# Patient Record
Sex: FEMALE | Race: BLACK OR AFRICAN AMERICAN | NOT HISPANIC OR LATINO | Employment: FULL TIME | URBAN - METROPOLITAN AREA
[De-identification: names, ages, dates, MRNs, and addresses within clinical notes are randomized per-mention and may not be internally consistent; named-entity substitution may affect disease eponyms.]

---

## 2018-04-26 ENCOUNTER — OFFICE VISIT (OUTPATIENT)
Dept: INTERNAL MEDICINE CLINIC | Facility: CLINIC | Age: 53
End: 2018-04-26
Payer: COMMERCIAL

## 2018-04-26 VITALS
DIASTOLIC BLOOD PRESSURE: 90 MMHG | SYSTOLIC BLOOD PRESSURE: 122 MMHG | HEART RATE: 80 BPM | HEIGHT: 63 IN | RESPIRATION RATE: 14 BRPM | WEIGHT: 175 LBS | BODY MASS INDEX: 31.01 KG/M2

## 2018-04-26 DIAGNOSIS — I10 ESSENTIAL HYPERTENSION: Primary | ICD-10-CM

## 2018-04-26 DIAGNOSIS — N18.2 CHRONIC KIDNEY DISEASE, STAGE 2 (MILD): ICD-10-CM

## 2018-04-26 DIAGNOSIS — I51.7 LEFT ATRIAL ENLARGEMENT: ICD-10-CM

## 2018-04-26 PROCEDURE — 3725F SCREEN DEPRESSION PERFORMED: CPT | Performed by: INTERNAL MEDICINE

## 2018-04-26 PROCEDURE — 93000 ELECTROCARDIOGRAM COMPLETE: CPT | Performed by: INTERNAL MEDICINE

## 2018-04-26 PROCEDURE — 99204 OFFICE O/P NEW MOD 45 MIN: CPT | Performed by: INTERNAL MEDICINE

## 2018-04-26 RX ORDER — AMLODIPINE BESYLATE 10 MG/1
TABLET ORAL
Refills: 1 | COMMUNITY
Start: 2018-04-14 | End: 2018-06-05 | Stop reason: ALTCHOICE

## 2018-04-26 RX ORDER — ASPIRIN 81 MG/1
81 TABLET ORAL DAILY
COMMUNITY
End: 2019-10-08 | Stop reason: ALTCHOICE

## 2018-04-26 RX ORDER — CLONIDINE HYDROCHLORIDE 0.1 MG/1
0.1 TABLET ORAL EVERY 8 HOURS SCHEDULED
Qty: 90 TABLET | Refills: 3 | Status: SHIPPED | OUTPATIENT
Start: 2018-04-26 | End: 2018-05-31 | Stop reason: SDUPTHER

## 2018-04-26 NOTE — PATIENT INSTRUCTIONS
Blood pressure control needs a bit of improvement  Left atrial enlargement on EKG needs assessment with echocardiogram   Recommendation is the echocardiogram plus ultrasound of the kidney and bladder, Nephrology consultation, more comprehensive laboratory testing include 24 hr urine, follow-up in a month    Trial of clonidine 0 1 mg  Take it once a day for 3 days, then twice a day for 3 days, then advance to the full 3 times a day at intervals  Continue the amlodipine

## 2018-04-26 NOTE — PROGRESS NOTES
Assessment/Plan:       Diagnoses and all orders for this visit:    Essential hypertension  -     CBC and differential; Future  -     Lipid Panel with Direct LDL reflex; Future  -     Comprehensive metabolic panel; Future  -     TSH, 3rd generation with T4 reflex; Future  -     Urinalysis with reflex to microscopic  -     Echo complete with contrast if indicated; Future  -     POCT ECG    Chronic kidney disease, stage 2 (mild)  -     Creatinine Clearance 24 Hr; Future  -     Ambulatory referral to Nephrology; Future  -     US kidney and bladder; Future    Left atrial enlargement  -     Echo complete with contrast if indicated; Future  -     POCT ECG    Other orders  -     amLODIPine (NORVASC) 10 mg tablet;   -     aspirin (ECOTRIN LOW STRENGTH) 81 mg EC tablet; Take 81 mg by mouth daily              Subjective:      Patient ID: Hernan Vu is a 45 y o  female  A 49-year-old [facility] American female feels well but is hypertensive and has noted increasing BUN and creatinine on laboratory testing  No other diagnoses referable to possible CKD  The following portions of the patient's history were reviewed and updated as appropriate:   She has a past medical history of Hypertension  ,   does not have any pertinent problems on file  ,   has no past surgical history on file  ,  family history includes Alzheimer's disease in her father; Diabetes in her mother; Heart disease in her mother; Hypertension in her father  ,   reports that she has never smoked  She has never used smokeless tobacco  She reports that she drinks alcohol  She reports that she does not use drugs  ,  has No Known Allergies     Current Outpatient Prescriptions   Medication Sig Dispense Refill    amLODIPine (NORVASC) 10 mg tablet   1    aspirin (ECOTRIN LOW STRENGTH) 81 mg EC tablet Take 81 mg by mouth daily       No current facility-administered medications for this visit          Review of Systems   Constitutional: Negative for chills and fever  HENT: Negative for sore throat and trouble swallowing  Eyes: Negative for pain  Respiratory: Negative for cough, shortness of breath and wheezing  Gastrointestinal: Negative for abdominal pain, diarrhea, nausea and vomiting  Endocrine: Negative for cold intolerance and heat intolerance  Genitourinary: Negative for dysuria, frequency and pelvic pain  Musculoskeletal: Negative for arthralgias and joint swelling  Skin: Negative for rash and wound  Allergic/Immunologic: Negative for immunocompromised state  Neurological: Negative for dizziness, seizures, syncope and headaches  Psychiatric/Behavioral: Negative for dysphoric mood  The patient is not nervous/anxious  Objective:  Vitals:    04/26/18 0816   BP: 122/90   Pulse: 80   Resp: 14      Physical Exam   Constitutional: She is oriented to person, place, and time  She appears well-developed and well-nourished  Significantly overweight but in no distress   HENT:   Head: Normocephalic and atraumatic  Eyes: EOM are normal  Pupils are equal, round, and reactive to light  Neck: Normal range of motion  Neck supple  No tracheal deviation present  No thyromegaly present  Cardiovascular: Normal rate, regular rhythm and normal heart sounds  Exam reveals no gallop  No murmur heard  Pulmonary/Chest: No respiratory distress  She has no wheezes  She has no rales  Abdominal: Soft  Bowel sounds are normal  There is no tenderness  Musculoskeletal: Normal range of motion  She exhibits no tenderness or deformity  Neurological: She is alert and oriented to person, place, and time  Coordination normal    Skin: Skin is warm  Psychiatric: She has a normal mood and affect   Judgment normal

## 2018-05-07 ENCOUNTER — HOSPITAL ENCOUNTER (OUTPATIENT)
Dept: ULTRASOUND IMAGING | Facility: HOSPITAL | Age: 53
Discharge: HOME | End: 2018-05-07
Payer: COMMERCIAL

## 2018-05-07 DIAGNOSIS — N18.2 CHRONIC KIDNEY DISEASE, STAGE 2 (MILD): ICD-10-CM

## 2018-05-07 PROCEDURE — 76770 US EXAM ABDO BACK WALL COMP: CPT

## 2018-05-16 LAB
CHOLEST SERPL-MCNC: 198 MG/DL (ref 50–200)
EXT GLUCOSE BLD: 116
EXTERNAL ALBUMIN: 3.9
EXTERNAL ALK PHOS: 100
EXTERNAL ALT: 20
EXTERNAL ANION GAP: 3
EXTERNAL AST: 12
EXTERNAL BICARBONATE: 30
EXTERNAL BUN: 12
EXTERNAL CALCIUM: 9.2
EXTERNAL CHLORIDE: 109
EXTERNAL CREATININE: 1.1
EXTERNAL EGFR: 57
EXTERNAL POTASSIUM: 4.1
EXTERNAL SODIUM: 142
EXTERNAL T.BILIRUBIN: 0.4
EXTERNAL TOTAL PROTEIN: 7.5
HCT VFR BLD AUTO: 40.7 % (ref 34.8–46.1)
HDLC SERPL-MCNC: 54 MG/DL (ref 40–60)
HGB BLD-MCNC: 13.6 G/DL (ref 11.5–15.4)
LDLC SERPL CALC-MCNC: 128 MG/DL (ref 0–100)
PLATELET # BLD AUTO: 302 THOUSANDS/UL (ref 149–390)
TRIGL SERPL-MCNC: 79 MG/DL (ref ?–150)
TSH SERPL DL<=0.005 MIU/L-ACNC: 1.15 M[IU]/L
WBC # BLD AUTO: 4.4 THOUSAND/UL

## 2018-05-17 ENCOUNTER — HOSPITAL ENCOUNTER (OUTPATIENT)
Dept: NON INVASIVE DIAGNOSTICS | Facility: HOSPITAL | Age: 53
Discharge: HOME | End: 2018-05-17
Payer: COMMERCIAL

## 2018-05-17 DIAGNOSIS — I51.7 LEFT ATRIAL ENLARGEMENT: ICD-10-CM

## 2018-05-17 DIAGNOSIS — I10 ESSENTIAL HYPERTENSION: ICD-10-CM

## 2018-05-17 PROCEDURE — 93306 TTE W/DOPPLER COMPLETE: CPT | Performed by: INTERNAL MEDICINE

## 2018-05-17 PROCEDURE — 93306 TTE W/DOPPLER COMPLETE: CPT

## 2018-05-25 ENCOUNTER — APPOINTMENT (OUTPATIENT)
Dept: LAB | Facility: HOSPITAL | Age: 53
End: 2018-05-25
Attending: INTERNAL MEDICINE
Payer: COMMERCIAL

## 2018-05-25 ENCOUNTER — OFFICE VISIT (OUTPATIENT)
Dept: NEPHROLOGY | Facility: CLINIC | Age: 53
End: 2018-05-25
Payer: COMMERCIAL

## 2018-05-25 VITALS
HEART RATE: 81 BPM | BODY MASS INDEX: 30.96 KG/M2 | DIASTOLIC BLOOD PRESSURE: 80 MMHG | TEMPERATURE: 96.9 F | WEIGHT: 172 LBS | SYSTOLIC BLOOD PRESSURE: 136 MMHG

## 2018-05-25 DIAGNOSIS — I12.9 HYPERTENSIVE KIDNEY DISEASE WITH STAGE 3 CHRONIC KIDNEY DISEASE (HCC): ICD-10-CM

## 2018-05-25 DIAGNOSIS — E55.9 VITAMIN D DEFICIENCY: ICD-10-CM

## 2018-05-25 DIAGNOSIS — N18.2 CHRONIC KIDNEY DISEASE, STAGE 2 (MILD): ICD-10-CM

## 2018-05-25 DIAGNOSIS — N18.30 HYPERTENSIVE KIDNEY DISEASE WITH STAGE 3 CHRONIC KIDNEY DISEASE (HCC): ICD-10-CM

## 2018-05-25 DIAGNOSIS — N18.30 STAGE 3 CHRONIC KIDNEY DISEASE (HCC): ICD-10-CM

## 2018-05-25 DIAGNOSIS — N18.30 STAGE 3 CHRONIC KIDNEY DISEASE (HCC): Primary | ICD-10-CM

## 2018-05-25 LAB
25(OH)D3 SERPL-MCNC: 25.2 NG/ML (ref 30–100)
ANION GAP SERPL CALCULATED.3IONS-SCNC: 9 MMOL/L (ref 4–13)
BASOPHILS # BLD AUTO: 0.02 THOUSANDS/ΜL (ref 0–0.1)
BASOPHILS NFR BLD AUTO: 0 % (ref 0–1)
BILIRUB UR QL STRIP: NEGATIVE
BUN SERPL-MCNC: 10 MG/DL (ref 5–25)
CALCIUM SERPL-MCNC: 9.4 MG/DL (ref 8.3–10.1)
CHLORIDE SERPL-SCNC: 104 MMOL/L (ref 100–108)
CLARITY UR: CLEAR
CO2 SERPL-SCNC: 29 MMOL/L (ref 21–32)
COLOR UR: YELLOW
CREAT SERPL-MCNC: 0.98 MG/DL (ref 0.6–1.3)
CREAT UR-MCNC: 187 MG/DL
EOSINOPHIL # BLD AUTO: 0.06 THOUSAND/ΜL (ref 0–0.61)
EOSINOPHIL NFR BLD AUTO: 1 % (ref 0–6)
ERYTHROCYTE [DISTWIDTH] IN BLOOD BY AUTOMATED COUNT: 13.2 % (ref 11.6–15.1)
GFR SERPL CREATININE-BSD FRML MDRD: 76 ML/MIN/1.73SQ M
GLUCOSE SERPL-MCNC: 137 MG/DL (ref 65–140)
GLUCOSE UR STRIP-MCNC: NEGATIVE MG/DL
HCT VFR BLD AUTO: 42.3 % (ref 34.8–46.1)
HGB BLD-MCNC: 13.7 G/DL (ref 11.5–15.4)
HGB UR QL STRIP.AUTO: NEGATIVE
IMM GRANULOCYTES # BLD AUTO: 0.01 THOUSAND/UL (ref 0–0.2)
IMM GRANULOCYTES NFR BLD AUTO: 0 % (ref 0–2)
KETONES UR STRIP-MCNC: NEGATIVE MG/DL
LEUKOCYTE ESTERASE UR QL STRIP: NEGATIVE
LYMPHOCYTES # BLD AUTO: 2.81 THOUSANDS/ΜL (ref 0.6–4.47)
LYMPHOCYTES NFR BLD AUTO: 59 % (ref 14–44)
MCH RBC QN AUTO: 26.4 PG (ref 26.8–34.3)
MCHC RBC AUTO-ENTMCNC: 32.4 G/DL (ref 31.4–37.4)
MCV RBC AUTO: 82 FL (ref 82–98)
MICROALBUMIN UR-MCNC: 26.7 MG/L (ref 0–20)
MICROALBUMIN/CREAT 24H UR: 14 MG/G CREATININE (ref 0–30)
MONOCYTES # BLD AUTO: 0.38 THOUSAND/ΜL (ref 0.17–1.22)
MONOCYTES NFR BLD AUTO: 8 % (ref 4–12)
NEUTROPHILS # BLD AUTO: 1.51 THOUSANDS/ΜL (ref 1.85–7.62)
NEUTS SEG NFR BLD AUTO: 32 % (ref 43–75)
NITRITE UR QL STRIP: NEGATIVE
NRBC BLD AUTO-RTO: 0 /100 WBCS
PH UR STRIP.AUTO: 7 [PH] (ref 4.5–8)
PHOSPHATE SERPL-MCNC: 3.9 MG/DL (ref 2.7–4.5)
PLATELET # BLD AUTO: 347 THOUSANDS/UL (ref 149–390)
PMV BLD AUTO: 10.5 FL (ref 8.9–12.7)
POTASSIUM SERPL-SCNC: 3 MMOL/L (ref 3.5–5.3)
PROT UR STRIP-MCNC: NEGATIVE MG/DL
PTH-INTACT SERPL-MCNC: 47.4 PG/ML (ref 18.4–80.1)
RBC # BLD AUTO: 5.18 MILLION/UL (ref 3.81–5.12)
SODIUM SERPL-SCNC: 142 MMOL/L (ref 136–145)
SP GR UR STRIP.AUTO: 1.01 (ref 1–1.03)
URATE SERPL-MCNC: 5.3 MG/DL (ref 2–6.8)
UROBILINOGEN UR QL STRIP.AUTO: 0.2 E.U./DL
WBC # BLD AUTO: 4.79 THOUSAND/UL (ref 4.31–10.16)

## 2018-05-25 PROCEDURE — 81003 URINALYSIS AUTO W/O SCOPE: CPT

## 2018-05-25 PROCEDURE — 83970 ASSAY OF PARATHORMONE: CPT

## 2018-05-25 PROCEDURE — 85025 COMPLETE CBC W/AUTO DIFF WBC: CPT

## 2018-05-25 PROCEDURE — 80048 BASIC METABOLIC PNL TOTAL CA: CPT

## 2018-05-25 PROCEDURE — 84100 ASSAY OF PHOSPHORUS: CPT

## 2018-05-25 PROCEDURE — 82570 ASSAY OF URINE CREATININE: CPT

## 2018-05-25 PROCEDURE — 99244 OFF/OP CNSLTJ NEW/EST MOD 40: CPT | Performed by: INTERNAL MEDICINE

## 2018-05-25 PROCEDURE — 82306 VITAMIN D 25 HYDROXY: CPT

## 2018-05-25 PROCEDURE — 84550 ASSAY OF BLOOD/URIC ACID: CPT

## 2018-05-25 PROCEDURE — 36415 COLL VENOUS BLD VENIPUNCTURE: CPT

## 2018-05-25 PROCEDURE — 82043 UR ALBUMIN QUANTITATIVE: CPT

## 2018-05-25 NOTE — PROGRESS NOTES
NEPHROLOGY OFFICE CONSULT  Son Brown 45 y o  female MRN: [id number]    Encounter: [id number] DATE: 5/25/2018    REASON FOR VISIT: Son Brown is a 45 y  o female who was referred by Dr Ulrich for evaluation  Rubia Batista HPI:    This is a 49-year-old female with a past medical history of hypertension, referred to Nephrology for further management of CKD stage 3  Patient said that in February she was found to have GFR of 47 but last known creatinine is 1 1 with EGFR of 57 (5/7/2018)    Patient has a history of hypertension and has been taking antihypertensive medication for > 10 years  Patient said that her blood pressure was not under very well control and last month she was started on clonidine 0 1 mg p o  BID and since that time her blood pressure seems to be under well control  Patient also complains of on and off leg swelling and currently have very trace pedal edema  Patient said that her leg swelling get worsen as day progress  Patient is also complained about some right flank pain and was recently prescribed Mobic but before that seems that patient was prescribed ibuprofen  REVIEW OF SYSTEMS:    Review of Systems   Constitutional: Negative for chills and fever  HENT: Negative for nosebleeds and sore throat  Eyes: Negative for photophobia and pain  Respiratory: Negative for choking and wheezing  Cardiovascular: Negative for palpitations and leg swelling  Gastrointestinal: Negative for abdominal pain and blood in stool  Endocrine: Negative for cold intolerance and heat intolerance  Genitourinary: Negative for flank pain and hematuria  Musculoskeletal: Negative for joint swelling and neck pain  Skin: Negative for color change and pallor  Allergic/Immunologic: Negative for environmental allergies and immunocompromised state  Neurological: Negative for seizures and syncope  Hematological: Negative for adenopathy  Does not bruise/bleed easily  Psychiatric/Behavioral: Negative for confusion and suicidal ideas  PAST MEDICAL HISTORY:  Past Medical History:   Diagnosis Date    Hypertension        PAST SURGICAL HISTORY:  History reviewed  No pertinent surgical history  SOCIAL HISTORY:  History   Alcohol Use    Yes     Comment: sometimes      History   Drug Use No     History   Smoking Status    Never Smoker   Smokeless Tobacco    Never Used       FAMILY HISTORY:  Family History   Problem Relation Age of Onset    Diabetes Mother     Heart disease Mother     Alzheimer's disease Father     Hypertension Father        ALLERGY:  No Known Allergies    MEDICATIONS:    Current Outpatient Prescriptions:     amLODIPine (NORVASC) 10 mg tablet, , Disp: , Rfl: 1    aspirin (ECOTRIN LOW STRENGTH) 81 mg EC tablet, Take 81 mg by mouth daily, Disp: , Rfl:     cloNIDine (CATAPRES) 0 1 mg tablet, Take 1 tablet (0 1 mg total) by mouth every 8 (eight) hours, Disp: 90 tablet, Rfl: 3    PHYSICAL EXAM:  Vitals:    05/25/18 0933   BP: 136/80   BP Location: Left arm   Patient Position: Sitting   Cuff Size: Adult   Pulse: 81   Temp: (!) 96 9 °F (36 1 °C)   TempSrc: Tympanic   Weight: 78 kg (172 lb)     Body mass index is 30 96 kg/m²  Physical Exam   Constitutional: She appears well-nourished  No distress  HENT:   Head: Normocephalic and atraumatic  Eyes: Conjunctivae are normal  No scleral icterus  Right eye exhibits normal extraocular motion  Left eye exhibits normal extraocular motion  Neck: Neck supple  No JVD present  Cardiovascular: Normal rate, S1 normal and S2 normal     Pulmonary/Chest: No accessory muscle usage  No respiratory distress  She has no wheezes  Abdominal: Soft  She exhibits no distension  Musculoskeletal:        Right ankle: She exhibits swelling  Left ankle: She exhibits swelling  Right hand: She exhibits no laceration  Left hand: She exhibits no laceration     Lymphadenopathy:        Right cervical: No superficial cervical adenopathy present  Left cervical: No superficial cervical adenopathy present  Right: No supraclavicular adenopathy present  Left: No supraclavicular adenopathy present  Neurological: She is alert  She is not disoriented  Skin: Skin is warm  No laceration noted  No cyanosis  Psychiatric: She is not combative  She does not exhibit a depressed mood  She expresses no suicidal ideation  LAB RESULTS:  Results for orders placed or performed in visit on 05/16/18   Comprehensive metabolic panel   Result Value Ref Range    EXTERNAL SODIUM 142     EXTERNAL POTASSIUM 4 1     EXTERNAL CHLORIDE 109     EXTERNAL BICARB 30     EXTERNAL ANION GAP 3     EXTERNAL BUN 12     EXTERNAL CREATININE 1 10     EXT Glucose Bld 116     EXTERNAL CALCIUM 9 2     EXTERNAL TOTAL PROTEIN 7 5     EXTERNAL ALBUMIN 3 9     EXTERNAL AST 12     EXTERNAL ALT 20     EXTERNAL ALK PHOS 100     EXTERNAL TOT  BILIRUBIN 0 4     EXTERNAL EGFR 57    Lipid panel   Result Value Ref Range    Cholesterol 198 50 - 200 mg/dL    Triglycerides 79 150 mg/dL    Total HDL  Direct 54 40 - 60 mg/dL    LDL Calculated 128 (A) 0 - 100 mg/dL   CBC   Result Value Ref Range    WBC 4 40 Thousand/uL    Hemoglobin 13 6 11 5 - 15 4 g/dL    Hematocrit 40 7 34 8 - 46 1 %    Platelets 633 133 - 737 Thousands/uL   TSH Stimulation   Result Value Ref Range    TSH 1 15        ASSESSMENT and PLAN:  Roni Greenwood was seen today for consult  Diagnoses and all orders for this visit:    Stage 3 chronic kidney disease  -     Basic metabolic panel; Future  -     CBC and differential; Future  -     Microalbumin / creatinine urine ratio; Future  -     Phosphorus; Future  -     PTH, intact; Future  -     Uric acid; Future  -     Urinalysis with reflex to microscopic; Future  -     Vitamin D 25 hydroxy; Future  -     Basic metabolic panel; Future  -     CBC and differential; Future  -     Microalbumin / creatinine urine ratio;  Future  -     Urinalysis with reflex to microscopic; Future    Hypertensive kidney disease with stage 3 chronic kidney disease  -     Basic metabolic panel; Future    Chronic kidney disease, stage 2 (mild)  -     Ambulatory referral to Nephrology      1  CKD stage 3  Multifactorial and suspect secondary to underlying hypertensive nephrosclerosis  Patient's last known creatinine is 1 1 with EGFR of 57  Plan to check CBC, BMP, uric acid, PTH, vitamin-D, urine analysis along with urine microalbumin to creatinine ratio to further evaluate chronic kidney disease  The patient had underwent renal ultrasound on 5/7/2018 and found to have increased echogenicity but no stone or hydronephrosis  Currently hypertension is under well control  Patient was advised to avoid Mobic and other NSAID  Plan to repeat renal function before next visit  2   Hypertension in chronic kidney disease  Today's blood pressure was controlled and at goal   Plan to monitor hypertension today with clonidine 0 1 mg p o  TID and amlodipine 10 mg p o  daily  Advised to follow low-salt diet  Returns to Nephrology Clinic in 3 months  Plan to check CBC, BMP, urine analysis along with urine microalbumin to creatinine ratio before next visit  Patient can be reached at [phone]    Lab (reviewed on 6/5/2018)-called and discussed results with patient today  Improved creatinine at 0 98 with EGFR of 76  Hemoglobin 13 7  Vitamin-D 25-> start ergocalciferol 50,000 Units p o  weekly x8 weeks  Potassium 3 0-> advised patient to eat high potassium diet for next 3-4 days  Urine analysis showed no dysuria or hematuria  Urine microalbumin to creatinine ratio of 14 mg  Normal PTH (47), uric acid (5 3), sodium, bicarb, calcium and phosphorus  New prescription was sent to the pharmacy  Patient was complaining of bilateral leg swelling  I doubt that swelling is related to underlying chronic kidney disease in the light of current GFR    Advised patient to follow fluid restriction plus advised to wear compression stocking during the day time  Into bruit amlodipine can cause leg swelling and plan to stop amlodipine for time being  Patient is taking clonidine 0 1 mg p o  at night only (and not 3 times a day) and reluctant to go up on clonidine dose for time being  Advised patient to keep taking current dose of clonidine and contact me if systolic blood pressure noticed to be > 160 for > 3 days for further evaluation  All the questions were answered  Phone call (6/12/2018)  Patient does not like leg swelling or like to stop amlodipine  Discussed overall case with patient today  Plan to stop amlodipine  Patient cannot take higher doses of clonidine as it will make her sleepier  Plan to start patient on Coreg 12 5 mg PO BID  New prescription was sent to the pharmacy  Phone call (6/28/2018)  Patient called earlier and worried that her systolic blood pressure has still remained elevated  I have called and discussed overall case with patient and seems that patient's SBP is running > 140  Plan to increase Coreg to 25 mg p o  BID (patient is also taking clonidine 0 1 mg p o  at night time)     All the questions were answered  New prescription was sent to the pharmacy  Advised patient to make a log of blood pressure reading for my review with the next visit  Lab (reviewed on 8/22/2018)  Relatively stable creatinine at 1 13 with EGFR of 63  Hemoglobin 13 5  Urine analysis showed improper collection  Urine microalbumin to creatinine ratio of 15 mg  Potassium 3 4-> monitor  Normal sodium, bicarb and calcium    NO CHANGE    Lab (reviewed on 10/17/2018)  Stable creatinine at 0 96 with EGFR of 77  Hemoglobin 13 4  Urine analysis showed no dysuria  Urine microalbumin to creatinine ratio of 18 mg  Normal sodium, potassium, bicarb and calcium    NO CHANGE    Portions of the record may have been created with voice recognition software   Occasional wrong word or "sound a like" substitutions may have occurred due to the inherent limitations of voice recognition software  Read the chart carefully and recognize, using context, where substitutions have occurred  If you have any questions, please contact the dictating provider

## 2018-05-25 NOTE — LETTER
May 25, 2018     Rosanna Link MD  [address] [facility] [address]    Patient: Lorenza Bishop   YOB: 1965   Date of Visit: 5/25/2018       Dear Dr Randa Darling: Thank you for referring Lorenza Bishop to me for evaluation  Below are my notes for this consultation  If you have questions, please do not hesitate to call me  I look forward to following your patient along with you  Sincerely,        Sue Reed MD        CC: No Recipients  Sue Reed MD  5/25/2018 11:50 AM  Sign at close encounter  Naldo Machado 45 y o  female MRN: [id number]    Encounter: [id number] DATE: 5/25/2018    REASON FOR VISIT: Lorenza Bishop is a 45 y  o female who was referred by Dr Ulrich for evaluation  Tomás Rivera HPI:    This is a 49-year-old female with a past medical history of hypertension, referred to Nephrology for further management of CKD stage 3  Patient said that in February she was found to have GFR of 47 but last known creatinine is 1 1 with EGFR of 57 (5/7/2018)    Patient has a history of hypertension and has been taking antihypertensive medication for > 10 years  Patient said that her blood pressure was not under very well control and last month she was started on clonidine 0 1 mg p o  BID and since that time her blood pressure seems to be under well control  Patient also complains of on and off leg swelling and currently have very trace pedal edema  Patient said that her leg swelling get worsen as day progress  Patient is also complained about some right flank pain and was recently prescribed Mobic but before that seems that patient was prescribed ibuprofen  REVIEW OF SYSTEMS:    Review of Systems   Constitutional: Negative for chills and fever  HENT: Negative for nosebleeds and sore throat  Eyes: Negative for photophobia and pain  Respiratory: Negative for choking and wheezing  Cardiovascular: Negative for palpitations and leg swelling  Gastrointestinal: Negative for abdominal pain and blood in stool  Endocrine: Negative for cold intolerance and heat intolerance  Genitourinary: Negative for flank pain and hematuria  Musculoskeletal: Negative for joint swelling and neck pain  Skin: Negative for color change and pallor  Allergic/Immunologic: Negative for environmental allergies and immunocompromised state  Neurological: Negative for seizures and syncope  Hematological: Negative for adenopathy  Does not bruise/bleed easily  Psychiatric/Behavioral: Negative for confusion and suicidal ideas  PAST MEDICAL HISTORY:  Past Medical History:   Diagnosis Date    Hypertension        PAST SURGICAL HISTORY:  History reviewed  No pertinent surgical history  SOCIAL HISTORY:  History   Alcohol Use    Yes     Comment: sometimes      History   Drug Use No     History   Smoking Status    Never Smoker   Smokeless Tobacco    Never Used       FAMILY HISTORY:  Family History   Problem Relation Age of Onset    Diabetes Mother     Heart disease Mother     Alzheimer's disease Father     Hypertension Father        ALLERGY:  No Known Allergies    MEDICATIONS:    Current Outpatient Prescriptions:     amLODIPine (NORVASC) 10 mg tablet, , Disp: , Rfl: 1    aspirin (ECOTRIN LOW STRENGTH) 81 mg EC tablet, Take 81 mg by mouth daily, Disp: , Rfl:     cloNIDine (CATAPRES) 0 1 mg tablet, Take 1 tablet (0 1 mg total) by mouth every 8 (eight) hours, Disp: 90 tablet, Rfl: 3    PHYSICAL EXAM:  Vitals:    05/25/18 0933   BP: 136/80   BP Location: Left arm   Patient Position: Sitting   Cuff Size: Adult   Pulse: 81   Temp: (!) 96 9 °F (36 1 °C)   TempSrc: Tympanic   Weight: 78 kg (172 lb)     Body mass index is 30 96 kg/m²  Physical Exam   Constitutional: She appears well-nourished  No distress  HENT:   Head: Normocephalic and atraumatic     Eyes: Conjunctivae are normal  No scleral icterus  Right eye exhibits normal extraocular motion  Left eye exhibits normal extraocular motion  Neck: Neck supple  No JVD present  Cardiovascular: Normal rate, S1 normal and S2 normal     Pulmonary/Chest: No accessory muscle usage  No respiratory distress  She has no wheezes  Abdominal: Soft  She exhibits no distension  Musculoskeletal:        Right ankle: She exhibits swelling  Left ankle: She exhibits swelling  Right hand: She exhibits no laceration  Left hand: She exhibits no laceration  Lymphadenopathy:        Right cervical: No superficial cervical adenopathy present  Left cervical: No superficial cervical adenopathy present  Right: No supraclavicular adenopathy present  Left: No supraclavicular adenopathy present  Neurological: She is alert  She is not disoriented  Skin: Skin is warm  No laceration noted  No cyanosis  Psychiatric: She is not combative  She does not exhibit a depressed mood  She expresses no suicidal ideation  LAB RESULTS:  Results for orders placed or performed in visit on 05/16/18   Comprehensive metabolic panel   Result Value Ref Range    EXTERNAL SODIUM 142     EXTERNAL POTASSIUM 4 1     EXTERNAL CHLORIDE 109     EXTERNAL BICARB 30     EXTERNAL ANION GAP 3     EXTERNAL BUN 12     EXTERNAL CREATININE 1 10     EXT Glucose Bld 116     EXTERNAL CALCIUM 9 2     EXTERNAL TOTAL PROTEIN 7 5     EXTERNAL ALBUMIN 3 9     EXTERNAL AST 12     EXTERNAL ALT 20     EXTERNAL ALK PHOS 100     EXTERNAL TOT   BILIRUBIN 0 4     EXTERNAL EGFR 57    Lipid panel   Result Value Ref Range    Cholesterol 198 50 - 200 mg/dL    Triglycerides 79 150 mg/dL    Total HDL  Direct 54 40 - 60 mg/dL    LDL Calculated 128 (A) 0 - 100 mg/dL   CBC   Result Value Ref Range    WBC 4 40 Thousand/uL    Hemoglobin 13 6 11 5 - 15 4 g/dL    Hematocrit 40 7 34 8 - 46 1 %    Platelets 965 906 - 436 Thousands/uL   TSH Stimulation   Result Value Ref Range    TSH 1 15 ASSESSMENT and PLAN:  Beau Walker was seen today for consult  Diagnoses and all orders for this visit:    Stage 3 chronic kidney disease  -     Basic metabolic panel; Future  -     CBC and differential; Future  -     Microalbumin / creatinine urine ratio; Future  -     Phosphorus; Future  -     PTH, intact; Future  -     Uric acid; Future  -     Urinalysis with reflex to microscopic; Future  -     Vitamin D 25 hydroxy; Future  -     Basic metabolic panel; Future  -     CBC and differential; Future  -     Microalbumin / creatinine urine ratio; Future  -     Urinalysis with reflex to microscopic; Future    Hypertensive kidney disease with stage 3 chronic kidney disease  -     Basic metabolic panel; Future    Chronic kidney disease, stage 2 (mild)  -     Ambulatory referral to Nephrology      1  CKD stage 3  Multifactorial and suspect secondary to underlying hypertensive nephrosclerosis  Patient's last known creatinine is 1 1 with EGFR of 57  Plan to check CBC, BMP, uric acid, PTH, vitamin-D, urine analysis along with urine microalbumin to creatinine ratio to further evaluate chronic kidney disease  The patient had underwent renal ultrasound on 5/7/2018 and found to have increased echogenicity but no stone or hydronephrosis  Currently hypertension is under well control  Patient was advised to avoid Mobic and other NSAID  Plan to repeat renal function before next visit  2   Hypertension in chronic kidney disease  Today's blood pressure was controlled and at goal   Plan to monitor hypertension today with clonidine 0 1 mg p o  TID and amlodipine 10 mg p o  daily  Advised to follow low-salt diet  Returns to Nephrology Clinic in 3 months  Plan to check CBC, BMP, urine analysis along with urine microalbumin to creatinine ratio before next visit  Patient can be reached at [phone]    Portions of the record may have been created with voice recognition software   Occasional wrong word or "sound a like" substitutions may have occurred due to the inherent limitations of voice recognition software  Read the chart carefully and recognize, using context, where substitutions have occurred  If you have any questions, please contact the dictating provider

## 2018-05-31 ENCOUNTER — OFFICE VISIT (OUTPATIENT)
Dept: INTERNAL MEDICINE CLINIC | Facility: CLINIC | Age: 53
End: 2018-05-31
Payer: COMMERCIAL

## 2018-05-31 VITALS
BODY MASS INDEX: 30.23 KG/M2 | HEIGHT: 63 IN | WEIGHT: 170.6 LBS | SYSTOLIC BLOOD PRESSURE: 100 MMHG | DIASTOLIC BLOOD PRESSURE: 82 MMHG | OXYGEN SATURATION: 97 % | HEART RATE: 72 BPM

## 2018-05-31 DIAGNOSIS — N18.2 CHRONIC KIDNEY DISEASE, STAGE 2 (MILD): ICD-10-CM

## 2018-05-31 DIAGNOSIS — I10 ESSENTIAL HYPERTENSION: ICD-10-CM

## 2018-05-31 DIAGNOSIS — I34.0 MILD MITRAL REGURGITATION BY PRIOR ECHOCARDIOGRAPHY: Primary | ICD-10-CM

## 2018-05-31 PROCEDURE — 3079F DIAST BP 80-89 MM HG: CPT | Performed by: INTERNAL MEDICINE

## 2018-05-31 PROCEDURE — 99213 OFFICE O/P EST LOW 20 MIN: CPT | Performed by: INTERNAL MEDICINE

## 2018-05-31 PROCEDURE — 3074F SYST BP LT 130 MM HG: CPT | Performed by: INTERNAL MEDICINE

## 2018-05-31 PROCEDURE — 3008F BODY MASS INDEX DOCD: CPT | Performed by: INTERNAL MEDICINE

## 2018-05-31 RX ORDER — CLONIDINE HYDROCHLORIDE 0.1 MG/1
TABLET ORAL
Qty: 90 TABLET | Refills: 0
Start: 2018-05-31 | End: 2018-10-18 | Stop reason: ALTCHOICE

## 2018-05-31 NOTE — PROGRESS NOTES
Assessment/Plan:       Diagnoses and all orders for this visit:    Mild mitral regurgitation by prior echocardiography    Essential hypertension  -     cloNIDine (CATAPRES) 0 1 mg tablet; 1 tab hs daily    Chronic kidney disease, stage 2 (mild)              Subjective:      Patient ID: Debby Melchor is a 45 y o  female  Follow-up visit of a 53-year-old [facility] American female who is hypertensive  An EKG documented left atrial enlargement but follow-up echocardiogram was normal except for slight mitral regurgitation which may be followed on a serial basis  Systolic parameters were normal as were diastolic    Renal ultrasound was done and was normal except for slight findings indicating medical renal disease    Seen by Nephrology    Returns for follow-up today  The following portions of the patient's history were reviewed and updated as appropriate:   She has no past medical history on file  ,   does not have any pertinent problems on file  ,   has no past surgical history on file  ,  family history includes Alzheimer's disease in her father; Diabetes in her mother; Heart disease in her mother; Hypertension in her father  ,   reports that she has never smoked  She has never used smokeless tobacco  She reports that she drinks alcohol  She reports that she does not use drugs  ,  has No Known Allergies     Current Outpatient Prescriptions   Medication Sig Dispense Refill    amLODIPine (NORVASC) 10 mg tablet   1    aspirin (ECOTRIN LOW STRENGTH) 81 mg EC tablet Take 81 mg by mouth daily      cloNIDine (CATAPRES) 0 1 mg tablet 1 tab hs daily 90 tablet 0     No current facility-administered medications for this visit  Review of Systems   Constitutional: Negative for fatigue  Respiratory: Negative for cough and shortness of breath  Cardiovascular: Negative for chest pain, palpitations and leg swelling           Objective:  Vitals:    05/31/18 0753   BP: 100/82   Pulse: 72   SpO2: 97% Physical Exam   Constitutional: She appears well-developed and well-nourished  No distress  Pulmonary/Chest: Effort normal  No respiratory distress  Neurological: She is alert  Psychiatric: She has a normal mood and affect

## 2018-05-31 NOTE — PATIENT INSTRUCTIONS
A middle-aged female with well-controlled hypertension   Chronic kidney disease stage 1 or 2 at the most; better than we had hoped   Echocardiogram shows normal systolic and diastolic function but does document mild mitral regurgitation; no symptoms are referable to this  This can be Re checked on a yearly basis  Other laboratory testing doing quite well  Follow-up to see me again in 1 year  Routine colonoscopies and gynecology visits per the usual protocols

## 2018-06-05 RX ORDER — ERGOCALCIFEROL 1.25 MG/1
50000 CAPSULE ORAL WEEKLY
Qty: 8 CAPSULE | Refills: 0 | Status: SHIPPED | OUTPATIENT
Start: 2018-06-05 | End: 2018-10-18 | Stop reason: ALTCHOICE

## 2018-06-12 ENCOUNTER — TELEPHONE (OUTPATIENT)
Dept: NEPHROLOGY | Facility: CLINIC | Age: 53
End: 2018-06-12

## 2018-06-12 DIAGNOSIS — I10 HYPERTENSION, UNSPECIFIED TYPE: Primary | ICD-10-CM

## 2018-06-12 RX ORDER — CARVEDILOL 12.5 MG/1
12.5 TABLET ORAL 2 TIMES DAILY WITH MEALS
Qty: 180 TABLET | Refills: 2 | Status: SHIPPED | OUTPATIENT
Start: 2018-06-12 | End: 2018-06-28 | Stop reason: SDUPTHER

## 2018-06-12 NOTE — TELEPHONE ENCOUNTER
Returned patient call and discussed overall case today  Plan to stop amlodipine and start Coreg 12 5 mg PO BID  New prescription was sent to the pharmacy  All the questions were answered      Faisal Esteves

## 2018-06-28 ENCOUNTER — TELEPHONE (OUTPATIENT)
Dept: NEPHROLOGY | Facility: CLINIC | Age: 53
End: 2018-06-28

## 2018-06-28 DIAGNOSIS — I10 HYPERTENSION, UNSPECIFIED TYPE: ICD-10-CM

## 2018-06-28 RX ORDER — CARVEDILOL 25 MG/1
25 TABLET ORAL 2 TIMES DAILY WITH MEALS
Qty: 180 TABLET | Refills: 1 | Status: SHIPPED | OUTPATIENT
Start: 2018-06-28 | End: 2019-01-16 | Stop reason: SDUPTHER

## 2018-07-27 DIAGNOSIS — E55.9 VITAMIN D DEFICIENCY: ICD-10-CM

## 2018-07-30 RX ORDER — ERGOCALCIFEROL 1.25 MG/1
50000 CAPSULE ORAL WEEKLY
Qty: 8 CAPSULE | Refills: 0 | OUTPATIENT
Start: 2018-07-30 | End: 2018-09-18

## 2018-08-21 ENCOUNTER — TRANSCRIBE ORDERS (OUTPATIENT)
Dept: ADMINISTRATIVE | Facility: HOSPITAL | Age: 53
End: 2018-08-21

## 2018-08-21 ENCOUNTER — APPOINTMENT (OUTPATIENT)
Dept: LAB | Facility: HOSPITAL | Age: 53
End: 2018-08-21
Attending: INTERNAL MEDICINE
Payer: COMMERCIAL

## 2018-08-21 DIAGNOSIS — N18.30 CHRONIC KIDNEY DISEASE, STAGE III (MODERATE) (HCC): Primary | ICD-10-CM

## 2018-08-21 DIAGNOSIS — N18.30 CHRONIC KIDNEY DISEASE, STAGE III (MODERATE) (HCC): ICD-10-CM

## 2018-08-21 LAB
ANION GAP SERPL CALCULATED.3IONS-SCNC: 8 MMOL/L (ref 4–13)
BACTERIA UR QL AUTO: ABNORMAL /HPF
BASOPHILS # BLD AUTO: 0.03 THOUSANDS/ΜL (ref 0–0.1)
BASOPHILS NFR BLD AUTO: 1 % (ref 0–1)
BILIRUB UR QL STRIP: NEGATIVE
BUN SERPL-MCNC: 12 MG/DL (ref 5–25)
CALCIUM SERPL-MCNC: 9.5 MG/DL (ref 8.3–10.1)
CAOX CRY URNS QL MICRO: ABNORMAL /HPF
CHLORIDE SERPL-SCNC: 105 MMOL/L (ref 100–108)
CLARITY UR: ABNORMAL
CO2 SERPL-SCNC: 32 MMOL/L (ref 21–32)
COLOR UR: ABNORMAL
CREAT SERPL-MCNC: 1.13 MG/DL (ref 0.6–1.3)
EOSINOPHIL # BLD AUTO: 0.05 THOUSAND/ΜL (ref 0–0.61)
EOSINOPHIL NFR BLD AUTO: 1 % (ref 0–6)
ERYTHROCYTE [DISTWIDTH] IN BLOOD BY AUTOMATED COUNT: 12.9 % (ref 11.6–15.1)
GFR SERPL CREATININE-BSD FRML MDRD: 63 ML/MIN/1.73SQ M
GLUCOSE SERPL-MCNC: 99 MG/DL (ref 65–140)
GLUCOSE UR STRIP-MCNC: NEGATIVE MG/DL
HCT VFR BLD AUTO: 42 % (ref 34.8–46.1)
HGB BLD-MCNC: 13.5 G/DL (ref 11.5–15.4)
HGB UR QL STRIP.AUTO: NEGATIVE
HYALINE CASTS #/AREA URNS LPF: ABNORMAL /LPF
IMM GRANULOCYTES # BLD AUTO: 0.01 THOUSAND/UL (ref 0–0.2)
IMM GRANULOCYTES NFR BLD AUTO: 0 % (ref 0–2)
KETONES UR STRIP-MCNC: ABNORMAL MG/DL
LEUKOCYTE ESTERASE UR QL STRIP: ABNORMAL
LYMPHOCYTES # BLD AUTO: 2.67 THOUSANDS/ΜL (ref 0.6–4.47)
LYMPHOCYTES NFR BLD AUTO: 48 % (ref 14–44)
MCH RBC QN AUTO: 26.8 PG (ref 26.8–34.3)
MCHC RBC AUTO-ENTMCNC: 32.1 G/DL (ref 31.4–37.4)
MCV RBC AUTO: 84 FL (ref 82–98)
MONOCYTES # BLD AUTO: 0.45 THOUSAND/ΜL (ref 0.17–1.22)
MONOCYTES NFR BLD AUTO: 8 % (ref 4–12)
MUCOUS THREADS UR QL AUTO: ABNORMAL
NEUTROPHILS # BLD AUTO: 2.34 THOUSANDS/ΜL (ref 1.85–7.62)
NEUTS SEG NFR BLD AUTO: 42 % (ref 43–75)
NITRITE UR QL STRIP: NEGATIVE
NON-SQ EPI CELLS URNS QL MICRO: ABNORMAL /HPF
NRBC BLD AUTO-RTO: 0 /100 WBCS
PH UR STRIP.AUTO: 6.5 [PH] (ref 4.5–8)
PLATELET # BLD AUTO: 331 THOUSANDS/UL (ref 149–390)
PMV BLD AUTO: 11.2 FL (ref 8.9–12.7)
POTASSIUM SERPL-SCNC: 3.4 MMOL/L (ref 3.5–5.3)
PROT UR STRIP-MCNC: ABNORMAL MG/DL
RBC # BLD AUTO: 5.03 MILLION/UL (ref 3.81–5.12)
RBC #/AREA URNS AUTO: ABNORMAL /HPF
SODIUM SERPL-SCNC: 145 MMOL/L (ref 136–145)
SP GR UR STRIP.AUTO: 1.02 (ref 1–1.03)
UROBILINOGEN UR QL STRIP.AUTO: 0.2 E.U./DL
WBC # BLD AUTO: 5.55 THOUSAND/UL (ref 4.31–10.16)
WBC #/AREA URNS AUTO: ABNORMAL /HPF

## 2018-08-21 PROCEDURE — 82570 ASSAY OF URINE CREATININE: CPT | Performed by: INTERNAL MEDICINE

## 2018-08-21 PROCEDURE — 85025 COMPLETE CBC W/AUTO DIFF WBC: CPT

## 2018-08-21 PROCEDURE — 36415 COLL VENOUS BLD VENIPUNCTURE: CPT

## 2018-08-21 PROCEDURE — 80048 BASIC METABOLIC PNL TOTAL CA: CPT

## 2018-08-21 PROCEDURE — 81001 URINALYSIS AUTO W/SCOPE: CPT | Performed by: INTERNAL MEDICINE

## 2018-08-21 PROCEDURE — 82043 UR ALBUMIN QUANTITATIVE: CPT | Performed by: INTERNAL MEDICINE

## 2018-08-22 LAB
CREAT UR-MCNC: 492 MG/DL
MICROALBUMIN UR-MCNC: 73.6 MG/L (ref 0–20)
MICROALBUMIN/CREAT 24H UR: 15 MG/G CREATININE (ref 0–30)

## 2018-08-23 ENCOUNTER — TELEPHONE (OUTPATIENT)
Dept: NEPHROLOGY | Facility: CLINIC | Age: 53
End: 2018-08-23

## 2018-08-23 DIAGNOSIS — N18.30 STAGE 3 CHRONIC KIDNEY DISEASE (HCC): Primary | ICD-10-CM

## 2018-08-23 NOTE — TELEPHONE ENCOUNTER
Called and left message on Luis Alfredo Franco cell phone for her to return my call about her blood work

## 2018-08-23 NOTE — TELEPHONE ENCOUNTER
Vaishnavi Deal did return my call, and I told her Per Dr Christoph Fontaine that her blood work was [facility] and the Dr Christoph Fontaine would like for her to repeat her blood work before her next appointment in October  I told Vaishnavi Deal that her new lab orders are in the mail for her  Vaishnavi Deal understood and was OK with it

## 2018-08-23 NOTE — TELEPHONE ENCOUNTER
Dr Juliette Garcia was suppose to have appointment on Monday 8/27 for a 3mo fu, but she had to RS due to work  Allen Colbert said that she had her blood work done 2 days ago and would like for you to look at it because Allen Colbert said that if anything is alarming with her blood work that she would keep her appointment for Monday  Please let Allen Colbert know if she is OK to keep her appointment in October or should she be put back on the schedule for Monday?

## 2018-08-23 NOTE — TELEPHONE ENCOUNTER
Please advised that blood work looks okay  Would recommend to redo the blood work again prior to next visit if patient is interested to follow-up with us in the future      Sunshine Wallis

## 2018-10-16 ENCOUNTER — APPOINTMENT (OUTPATIENT)
Dept: LAB | Facility: HOSPITAL | Age: 53
End: 2018-10-16
Attending: INTERNAL MEDICINE
Payer: COMMERCIAL

## 2018-10-16 DIAGNOSIS — N18.30 STAGE 3 CHRONIC KIDNEY DISEASE (HCC): ICD-10-CM

## 2018-10-16 LAB
ANION GAP SERPL CALCULATED.3IONS-SCNC: 8 MMOL/L (ref 4–13)
BACTERIA UR QL AUTO: ABNORMAL /HPF
BASOPHILS # BLD AUTO: 0.03 THOUSANDS/ΜL (ref 0–0.1)
BASOPHILS NFR BLD AUTO: 1 % (ref 0–1)
BILIRUB UR QL STRIP: NEGATIVE
BUN SERPL-MCNC: 13 MG/DL (ref 5–25)
CALCIUM SERPL-MCNC: 9.1 MG/DL (ref 8.3–10.1)
CHLORIDE SERPL-SCNC: 106 MMOL/L (ref 100–108)
CLARITY UR: CLEAR
CO2 SERPL-SCNC: 30 MMOL/L (ref 21–32)
COLOR UR: YELLOW
CREAT SERPL-MCNC: 0.96 MG/DL (ref 0.6–1.3)
CREAT UR-MCNC: 228 MG/DL
EOSINOPHIL # BLD AUTO: 0.04 THOUSAND/ΜL (ref 0–0.61)
EOSINOPHIL NFR BLD AUTO: 1 % (ref 0–6)
ERYTHROCYTE [DISTWIDTH] IN BLOOD BY AUTOMATED COUNT: 13.2 % (ref 11.6–15.1)
GFR SERPL CREATININE-BSD FRML MDRD: 77 ML/MIN/1.73SQ M
GLUCOSE SERPL-MCNC: 118 MG/DL (ref 65–140)
GLUCOSE UR STRIP-MCNC: NEGATIVE MG/DL
HCT VFR BLD AUTO: 41.8 % (ref 34.8–46.1)
HGB BLD-MCNC: 13.4 G/DL (ref 11.5–15.4)
HGB UR QL STRIP.AUTO: NEGATIVE
IMM GRANULOCYTES # BLD AUTO: 0.01 THOUSAND/UL (ref 0–0.2)
IMM GRANULOCYTES NFR BLD AUTO: 0 % (ref 0–2)
KETONES UR STRIP-MCNC: NEGATIVE MG/DL
LEUKOCYTE ESTERASE UR QL STRIP: NEGATIVE
LYMPHOCYTES # BLD AUTO: 2.06 THOUSANDS/ΜL (ref 0.6–4.47)
LYMPHOCYTES NFR BLD AUTO: 53 % (ref 14–44)
MCH RBC QN AUTO: 27.1 PG (ref 26.8–34.3)
MCHC RBC AUTO-ENTMCNC: 32.1 G/DL (ref 31.4–37.4)
MCV RBC AUTO: 85 FL (ref 82–98)
MICROALBUMIN UR-MCNC: 39.9 MG/L (ref 0–20)
MICROALBUMIN/CREAT 24H UR: 18 MG/G CREATININE (ref 0–30)
MONOCYTES # BLD AUTO: 0.38 THOUSAND/ΜL (ref 0.17–1.22)
MONOCYTES NFR BLD AUTO: 10 % (ref 4–12)
NEUTROPHILS # BLD AUTO: 1.33 THOUSANDS/ΜL (ref 1.85–7.62)
NEUTS SEG NFR BLD AUTO: 35 % (ref 43–75)
NITRITE UR QL STRIP: NEGATIVE
NON-SQ EPI CELLS URNS QL MICRO: ABNORMAL /HPF
NRBC BLD AUTO-RTO: 0 /100 WBCS
PH UR STRIP.AUTO: 7 [PH] (ref 4.5–8)
PLATELET # BLD AUTO: 308 THOUSANDS/UL (ref 149–390)
PMV BLD AUTO: 11.3 FL (ref 8.9–12.7)
POTASSIUM SERPL-SCNC: 3.5 MMOL/L (ref 3.5–5.3)
PROT UR STRIP-MCNC: ABNORMAL MG/DL
RBC # BLD AUTO: 4.94 MILLION/UL (ref 3.81–5.12)
RBC #/AREA URNS AUTO: ABNORMAL /HPF
SODIUM SERPL-SCNC: 144 MMOL/L (ref 136–145)
SP GR UR STRIP.AUTO: 1.01 (ref 1–1.03)
UROBILINOGEN UR QL STRIP.AUTO: 0.2 E.U./DL
WBC # BLD AUTO: 3.85 THOUSAND/UL (ref 4.31–10.16)
WBC #/AREA URNS AUTO: ABNORMAL /HPF

## 2018-10-16 PROCEDURE — 36415 COLL VENOUS BLD VENIPUNCTURE: CPT

## 2018-10-16 PROCEDURE — 82570 ASSAY OF URINE CREATININE: CPT

## 2018-10-16 PROCEDURE — 82043 UR ALBUMIN QUANTITATIVE: CPT

## 2018-10-16 PROCEDURE — 80048 BASIC METABOLIC PNL TOTAL CA: CPT

## 2018-10-16 PROCEDURE — 81001 URINALYSIS AUTO W/SCOPE: CPT

## 2018-10-16 PROCEDURE — 85025 COMPLETE CBC W/AUTO DIFF WBC: CPT

## 2018-10-18 ENCOUNTER — OFFICE VISIT (OUTPATIENT)
Dept: NEPHROLOGY | Facility: CLINIC | Age: 53
End: 2018-10-18
Payer: COMMERCIAL

## 2018-10-18 VITALS
TEMPERATURE: 98.9 F | HEART RATE: 94 BPM | WEIGHT: 178 LBS | SYSTOLIC BLOOD PRESSURE: 160 MMHG | DIASTOLIC BLOOD PRESSURE: 102 MMHG | BODY MASS INDEX: 32.04 KG/M2

## 2018-10-18 DIAGNOSIS — N18.2 HYPERTENSIVE KIDNEY DISEASE WITH STAGE 2 CHRONIC KIDNEY DISEASE: ICD-10-CM

## 2018-10-18 DIAGNOSIS — N18.2 CHRONIC KIDNEY DISEASE, STAGE 2 (MILD): Primary | ICD-10-CM

## 2018-10-18 DIAGNOSIS — E55.9 VITAMIN D DEFICIENCY: ICD-10-CM

## 2018-10-18 DIAGNOSIS — I12.9 HYPERTENSIVE KIDNEY DISEASE WITH STAGE 2 CHRONIC KIDNEY DISEASE: ICD-10-CM

## 2018-10-18 PROCEDURE — 99214 OFFICE O/P EST MOD 30 MIN: CPT | Performed by: INTERNAL MEDICINE

## 2018-10-18 RX ORDER — HYDRALAZINE HYDROCHLORIDE 50 MG/1
50 TABLET, FILM COATED ORAL 3 TIMES DAILY
COMMUNITY
End: 2018-10-18 | Stop reason: SDUPTHER

## 2018-10-18 RX ORDER — HYDRALAZINE HYDROCHLORIDE 100 MG/1
100 TABLET, FILM COATED ORAL 3 TIMES DAILY
Qty: 270 TABLET | Refills: 2 | Status: SHIPPED | OUTPATIENT
Start: 2018-10-18 | End: 2019-01-16 | Stop reason: SDUPTHER

## 2018-10-18 NOTE — PROGRESS NOTES
NEPHROLOGY OFFICE FOLLOW-UP  Marcial Navarro 52 y o  female MRN: [id number]    Encounter: [id number] DATE: 10/18/2018    REASON FOR VISIT: Marcial Navarro is a 52 y  o female follow-up for further management of chronic kidney disease  HPI:    This is a 43-year-old female with a past medical history of hypertension, referred to Nephrology for further management of CKD stage 2-3  Upon review of old medical record patient's baseline creatinine seems to be fluctuating between 0 9-1 1  Patient has a history of hypertension and has been taking antihypertensive medication for > 10 years  Amlodipine was stopped in the past and patient was taking clonidine at night with Coreg 25 mg PO BID  In the interim patient has seen PCP in [facility] and clonidine was stopped and patient was started on hydralazine 50 mg PO TID  Lately patient has not been checking blood pressure at home  Patient also complains of on and off leg swelling and currently have very trace pedal edema  Patient said that her leg swelling get worsen as the day progress  Patient has used Mobic and ibuprofen in the past for flank pain but recently has not used any NSAIDs  Patient was also found to have vitamin-D deficiency and has finished 8 week course of ergocalciferol  REVIEW OF SYSTEMS:    Review of Systems   Constitutional: Negative for chills and fever  HENT: Negative for nosebleeds and sore throat  Eyes: Negative for photophobia and pain  Respiratory: Negative for choking and wheezing  Cardiovascular: Negative for palpitations and leg swelling  Gastrointestinal: Negative for abdominal pain and blood in stool  Endocrine: Negative for cold intolerance and heat intolerance  Genitourinary: Negative for flank pain and hematuria  Musculoskeletal: Negative for joint swelling and neck pain  Skin: Negative for color change and pallor     Allergic/Immunologic: Negative for environmental allergies and immunocompromised state  Neurological: Negative for seizures and syncope  Hematological: Negative for adenopathy  Does not bruise/bleed easily  Psychiatric/Behavioral: Negative for confusion and suicidal ideas  PAST MEDICAL HISTORY:  No past medical history on file  PAST SURGICAL HISTORY:  No past surgical history on file  SOCIAL HISTORY:  History   Alcohol Use    Yes     Comment: sometimes      History   Drug Use No     History   Smoking Status    Never Smoker   Smokeless Tobacco    Never Used       FAMILY HISTORY:  Family History   Problem Relation Age of Onset    Diabetes Mother     Heart disease Mother     Alzheimer's disease Father     Hypertension Father        ALLERGY:  No Known Allergies    MEDICATIONS:    Current Outpatient Prescriptions:     aspirin (ECOTRIN LOW STRENGTH) 81 mg EC tablet, Take 81 mg by mouth daily, Disp: , Rfl:     carvedilol (COREG) 25 mg tablet, Take 1 tablet (25 mg total) by mouth 2 (two) times a day with meals for 90 days, Disp: 180 tablet, Rfl: 1    hydrALAZINE (APRESOLINE) 100 MG tablet, Take 1 tablet (100 mg total) by mouth 3 (three) times a day for 90 days, Disp: 270 tablet, Rfl: 2    PHYSICAL EXAM:  Vitals:    10/18/18 1556   BP: (!) 160/102   BP Location: Left arm   Patient Position: Sitting   Cuff Size: Adult   Pulse: 94   Temp: 98 9 °F (37 2 °C)   TempSrc: Oral   Weight: 80 7 kg (178 lb)     Body mass index is 32 04 kg/m²  Physical Exam   Constitutional: She appears well-nourished  No distress  HENT:   Head: Normocephalic and atraumatic  Eyes: Conjunctivae are normal  No scleral icterus  Right eye exhibits normal extraocular motion  Left eye exhibits normal extraocular motion  Neck: Neck supple  No JVD present  Cardiovascular: Normal rate, S1 normal and S2 normal     Pulmonary/Chest: No accessory muscle usage  No respiratory distress  She has no wheezes  Abdominal: Soft  She exhibits no distension     Musculoskeletal:        Right ankle: She exhibits swelling  Left ankle: She exhibits swelling  Right hand: She exhibits no laceration  Left hand: She exhibits no laceration  Lymphadenopathy:        Right cervical: No superficial cervical adenopathy present  Left cervical: No superficial cervical adenopathy present  Right: No supraclavicular adenopathy present  Left: No supraclavicular adenopathy present  Neurological: She is alert  She is not disoriented  Skin: Skin is warm  No laceration noted  No cyanosis  Psychiatric: She is not combative  She does not exhibit a depressed mood  She expresses no suicidal ideation         LAB RESULTS:  Results for orders placed or performed in visit on 55/30/18   Basic metabolic panel   Result Value Ref Range    Sodium 144 136 - 145 mmol/L    Potassium 3 5 3 5 - 5 3 mmol/L    Chloride 106 100 - 108 mmol/L    CO2 30 21 - 32 mmol/L    ANION GAP 8 4 - 13 mmol/L    BUN 13 5 - 25 mg/dL    Creatinine 0 96 0 60 - 1 30 mg/dL    Glucose 118 65 - 140 mg/dL    Calcium 9 1 8 3 - 10 1 mg/dL    eGFR 77 ml/min/1 73sq m   CBC and differential   Result Value Ref Range    WBC 3 85 (L) 4 31 - 10 16 Thousand/uL    RBC 4 94 3 81 - 5 12 Million/uL    Hemoglobin 13 4 11 5 - 15 4 g/dL    Hematocrit 41 8 34 8 - 46 1 %    MCV 85 82 - 98 fL    MCH 27 1 26 8 - 34 3 pg    MCHC 32 1 31 4 - 37 4 g/dL    RDW 13 2 11 6 - 15 1 %    MPV 11 3 8 9 - 12 7 fL    Platelets 507 590 - 789 Thousands/uL    nRBC 0 /100 WBCs    Neutrophils Relative 35 (L) 43 - 75 %    Immat GRANS % 0 0 - 2 %    Lymphocytes Relative 53 (H) 14 - 44 %    Monocytes Relative 10 4 - 12 %    Eosinophils Relative 1 0 - 6 %    Basophils Relative 1 0 - 1 %    Neutrophils Absolute 1 33 (L) 1 85 - 7 62 Thousands/µL    Immature Grans Absolute 0 01 0 00 - 0 20 Thousand/uL    Lymphocytes Absolute 2 06 0 60 - 4 47 Thousands/µL    Monocytes Absolute 0 38 0 17 - 1 22 Thousand/µL    Eosinophils Absolute 0 04 0 00 - 0 61 Thousand/µL    Basophils Absolute 0 03 0 00 - 0 10 Thousands/µL   Microalbumin / creatinine urine ratio   Result Value Ref Range    Creatinine, Ur 228 0 mg/dL    Microalbum  ,U,Random 39 9 (H) 0 0 - 20 0 mg/L    Microalb Creat Ratio 18 0 - 30 mg/g creatinine   Urinalysis with reflex to microscopic   Result Value Ref Range    Color, UA Yellow     Clarity, UA Clear     Specific [place], UA 1 015 1 003 - 1 030    pH, UA 7 0 4 5 - 8 0    Leukocytes, UA Negative Negative    Nitrite, UA Negative Negative    Protein, UA Trace (A) Negative mg/dl    Glucose, UA Negative Negative mg/dl    Ketones, UA Negative Negative mg/dl    Urobilinogen, UA 0 2 0 2, 1 0 E U /dl E U /dl    Bilirubin, UA Negative Negative    Blood, UA Negative Negative   Urine Microscopic   Result Value Ref Range    RBC, UA None Seen None Seen, 0-5 /hpf    WBC, UA 0-1 (A) None Seen, 0-5, 5-55, 5-65 /hpf    Epithelial Cells Occasional None Seen, Occasional /hpf    Bacteria, UA Occasional None Seen, Occasional /hpf       ASSESSMENT and PLAN:  Bhanu Arroyo was seen today for follow-up  Diagnoses and all orders for this visit:    Chronic kidney disease, stage 2 (mild)  -     CBC and differential; Future  -     Basic metabolic panel; Future  -     Urinalysis with microscopic; Future  -     Microalbumin / creatinine urine ratio; Future  -     Vitamin D 25 hydroxy; Future    Hypertensive kidney disease with stage 2 chronic kidney disease  -     Basic metabolic panel; Future  -     hydrALAZINE (APRESOLINE) 100 MG tablet; Take 1 tablet (100 mg total) by mouth 3 (three) times a day for 90 days    Vitamin D deficiency  -     Vitamin D 25 hydroxy; Future      1  CKD stage 2  Multifactorial and suspect secondary to underlying hypertensive nephrosclerosis on top of use of NSAIDs  Lisa Lara Upon review of old medical record patient's baseline creatinine seems to be fluctuating between 0 9-1 1  In the interim patient's renal function has improved to current creatinine of 0 96 with EGFR of 77      The patient had underwent renal ultrasound on 5/7/2018 and found to have increased echogenicity but no stone or hydronephrosis  Currently hypertension is under well control  Patient was advised to avoid Mobic and other NSAID  Plan to repeat renal function before next visit  2   Hypertension in chronic kidney disease  Today's blood pressure was elevated and above the goal   Amlodipine was stopped in the past in the light of leg swelling  In the interim clonidine was also stop due to excessive dizziness  Currently patient is taking Coreg 25 mg PO BID along with hydralazine 50 mg PO TID and will plan to increase hydralazine to 100 mg PO TID today  Patient said that she is also taking hydralazine twice a day and not 3 times and discussed compliance with medication in length with patient today  Patient was also advised to follow low-salt diet  3   Vitamin-D deficiency  Patient was found to have low vitamin-D level and was prescribed 8 week course of ergocalciferol  Currently patient is not taking any vitamin-D supplementation  Plan to check vitamin-D level with the next visit  Currently calcium level is at goal     Returns to Nephrology Clinic in 9 months  Plan to check CBC, BMP, vitamin-D, urine analysis along with urine microalbumin to creatinine ratio before next visit  Labs (reviewed on 7/17/2019)  Stable creatinine at 0 97 with EGFR of 76  Hemoglobin 12 5  Urine analysis showed no dysuria  Urine microalbumin to creatinine ratio of 9 mg  Potassium 3 1-> ? Secondary to use of HCTZ -> increased dietary potassium intake  Normal vitamin-D (40), sodium, potassium, bicarb and calcium  Portions of the record may have been created with voice recognition software  Occasional wrong word or "sound a like" substitutions may have occurred due to the inherent limitations of voice recognition software   Read the chart carefully and recognize, using context, where substitutions have occurred  If you have any questions, please contact the dictating provider

## 2018-10-18 NOTE — LETTER
October 18, 2018     Jennifer Shea MD  [address] [address]    Patient: Samm Barrera   YOB: 1965   Date of Visit: 10/18/2018       Dear Dr Chely Mars: Thank you for referring Samm Barrera to me for evaluation  Below are my notes for this consultation  If you have questions, please do not hesitate to call me  I look forward to following your patient along with you  Sincerely,        Elvin Landa MD        CC: No Recipients  Elvin Landa MD  10/18/2018  4:26 PM  Sign at close encounter  [address] 52 y o  female MRN: [id number]    Encounter: [id number] DATE: 10/18/2018    REASON FOR VISIT: Samm Barrera is a 52 y  o female follow-up for further management of chronic kidney disease  HPI:    This is a 53-year-old female with a past medical history of hypertension, referred to Nephrology for further management of CKD stage 2-3  Upon review of old medical record patient's baseline creatinine seems to be fluctuating between 0 9-1 1  Patient has a history of hypertension and has been taking antihypertensive medication for > 10 years  Amlodipine was stopped in the past and patient was taking clonidine at night with Coreg 25 mg PO BID  In the interim patient has seen PCP in [place] area and clonidine was stopped and patient was started on hydralazine 50 mg PO TID  Lately patient has not been checking blood pressure at home  Patient also complains of on and off leg swelling and currently have very trace pedal edema  Patient said that her leg swelling get worsen as the day progress  Patient has used Mobic and ibuprofen in the past for flank pain but recently has not used any NSAIDs  Patient was also found to have vitamin-D deficiency and has finished 8 week course of ergocalciferol          REVIEW OF SYSTEMS:    Review of Systems   Constitutional: Negative for chills and fever    HENT: Negative for nosebleeds and sore throat  Eyes: Negative for photophobia and pain  Respiratory: Negative for choking and wheezing  Cardiovascular: Negative for palpitations and leg swelling  Gastrointestinal: Negative for abdominal pain and blood in stool  Endocrine: Negative for cold intolerance and heat intolerance  Genitourinary: Negative for flank pain and hematuria  Musculoskeletal: Negative for joint swelling and neck pain  Skin: Negative for color change and pallor  Allergic/Immunologic: Negative for environmental allergies and immunocompromised state  Neurological: Negative for seizures and syncope  Hematological: Negative for adenopathy  Does not bruise/bleed easily  Psychiatric/Behavioral: Negative for confusion and suicidal ideas  PAST MEDICAL HISTORY:  No past medical history on file  PAST SURGICAL HISTORY:  No past surgical history on file      SOCIAL HISTORY:  History   Alcohol Use    Yes     Comment: sometimes      History   Drug Use No     History   Smoking Status    Never Smoker   Smokeless Tobacco    Never Used       FAMILY HISTORY:  Family History   Problem Relation Age of Onset    Diabetes Mother     Heart disease Mother     Alzheimer's disease Father     Hypertension Father        ALLERGY:  No Known Allergies    MEDICATIONS:    Current Outpatient Prescriptions:     aspirin (ECOTRIN LOW STRENGTH) 81 mg EC tablet, Take 81 mg by mouth daily, Disp: , Rfl:     carvedilol (COREG) 25 mg tablet, Take 1 tablet (25 mg total) by mouth 2 (two) times a day with meals for 90 days, Disp: 180 tablet, Rfl: 1    hydrALAZINE (APRESOLINE) 100 MG tablet, Take 1 tablet (100 mg total) by mouth 3 (three) times a day for 90 days, Disp: 270 tablet, Rfl: 2    PHYSICAL EXAM:  Vitals:    10/18/18 1556   BP: (!) 160/102   BP Location: Left arm   Patient Position: Sitting   Cuff Size: Adult   Pulse: 94   Temp: 98 9 °F (37 2 °C)   TempSrc: Oral   Weight: 80 7 kg (178 lb)     Body mass index is 32 04 kg/m²  Physical Exam   Constitutional: She appears well-nourished  No distress  HENT:   Head: Normocephalic and atraumatic  Eyes: Conjunctivae are normal  No scleral icterus  Right eye exhibits normal extraocular motion  Left eye exhibits normal extraocular motion  Neck: Neck supple  No JVD present  Cardiovascular: Normal rate, S1 normal and S2 normal     Pulmonary/Chest: No accessory muscle usage  No respiratory distress  She has no wheezes  Abdominal: Soft  She exhibits no distension  Musculoskeletal:        Right ankle: She exhibits swelling  Left ankle: She exhibits swelling  Right hand: She exhibits no laceration  Left hand: She exhibits no laceration  Lymphadenopathy:        Right cervical: No superficial cervical adenopathy present  Left cervical: No superficial cervical adenopathy present  Right: No supraclavicular adenopathy present  Left: No supraclavicular adenopathy present  Neurological: She is alert  She is not disoriented  Skin: Skin is warm  No laceration noted  No cyanosis  Psychiatric: She is not combative  She does not exhibit a depressed mood  She expresses no suicidal ideation         LAB RESULTS:  Results for orders placed or performed in visit on 19/90/58   Basic metabolic panel   Result Value Ref Range    Sodium 144 136 - 145 mmol/L    Potassium 3 5 3 5 - 5 3 mmol/L    Chloride 106 100 - 108 mmol/L    CO2 30 21 - 32 mmol/L    ANION GAP 8 4 - 13 mmol/L    BUN 13 5 - 25 mg/dL    Creatinine 0 96 0 60 - 1 30 mg/dL    Glucose 118 65 - 140 mg/dL    Calcium 9 1 8 3 - 10 1 mg/dL    eGFR 77 ml/min/1 73sq m   CBC and differential   Result Value Ref Range    WBC 3 85 (L) 4 31 - 10 16 Thousand/uL    RBC 4 94 3 81 - 5 12 Million/uL    Hemoglobin 13 4 11 5 - 15 4 g/dL    Hematocrit 41 8 34 8 - 46 1 %    MCV 85 82 - 98 fL    MCH 27 1 26 8 - 34 3 pg    MCHC 32 1 31 4 - 37 4 g/dL    RDW 13 2 11 6 - 15 1 %    MPV 11 3 8 9 - 12 7 fL    Platelets 188 280 - 729 Thousands/uL    nRBC 0 /100 WBCs    Neutrophils Relative 35 (L) 43 - 75 %    Immat GRANS % 0 0 - 2 %    Lymphocytes Relative 53 (H) 14 - 44 %    Monocytes Relative 10 4 - 12 %    Eosinophils Relative 1 0 - 6 %    Basophils Relative 1 0 - 1 %    Neutrophils Absolute 1 33 (L) 1 85 - 7 62 Thousands/µL    Immature Grans Absolute 0 01 0 00 - 0 20 Thousand/uL    Lymphocytes Absolute 2 06 0 60 - 4 47 Thousands/µL    Monocytes Absolute 0 38 0 17 - 1 22 Thousand/µL    Eosinophils Absolute 0 04 0 00 - 0 61 Thousand/µL    Basophils Absolute 0 03 0 00 - 0 10 Thousands/µL   Microalbumin / creatinine urine ratio   Result Value Ref Range    Creatinine, Ur 228 0 mg/dL    Microalbum  ,U,Random 39 9 (H) 0 0 - 20 0 mg/L    Microalb Creat Ratio 18 0 - 30 mg/g creatinine   Urinalysis with reflex to microscopic   Result Value Ref Range    Color, UA Yellow     Clarity, UA Clear     Specific [place], UA 1 015 1 003 - 1 030    pH, UA 7 0 4 5 - 8 0    Leukocytes, UA Negative Negative    Nitrite, UA Negative Negative    Protein, UA Trace (A) Negative mg/dl    Glucose, UA Negative Negative mg/dl    Ketones, UA Negative Negative mg/dl    Urobilinogen, UA 0 2 0 2, 1 0 E U /dl E U /dl    Bilirubin, UA Negative Negative    Blood, UA Negative Negative   Urine Microscopic   Result Value Ref Range    RBC, UA None Seen None Seen, 0-5 /hpf    WBC, UA 0-1 (A) None Seen, 0-5, 5-55, 5-65 /hpf    Epithelial Cells Occasional None Seen, Occasional /hpf    Bacteria, UA Occasional None Seen, Occasional /hpf       ASSESSMENT and PLAN:  Rashida Navas was seen today for follow-up  Diagnoses and all orders for this visit:    Chronic kidney disease, stage 2 (mild)  -     CBC and differential; Future  -     Basic metabolic panel; Future  -     Urinalysis with microscopic; Future  -     Microalbumin / creatinine urine ratio; Future  -     Vitamin D 25 hydroxy;  Future    Hypertensive kidney disease with stage 2 chronic kidney disease  -     Basic metabolic panel; Future  -     hydrALAZINE (APRESOLINE) 100 MG tablet; Take 1 tablet (100 mg total) by mouth 3 (three) times a day for 90 days    Vitamin D deficiency  -     Vitamin D 25 hydroxy; Future      1  CKD stage 2  Multifactorial and suspect secondary to underlying hypertensive nephrosclerosis on top of use of NSAIDs  Gerhardt Sep Upon review of old medical record patient's baseline creatinine seems to be fluctuating between 0 9-1 1  In the interim patient's renal function has improved to current creatinine of 0 96 with EGFR of 77  The patient had underwent renal ultrasound on 5/7/2018 and found to have increased echogenicity but no stone or hydronephrosis  Currently hypertension is under well control  Patient was advised to avoid Mobic and other NSAID  Plan to repeat renal function before next visit  2   Hypertension in chronic kidney disease  Today's blood pressure was elevated and above the goal   Amlodipine was stopped in the past in the light of leg swelling  In the interim clonidine was also stop due to excessive dizziness  Currently patient is taking Coreg 25 mg PO BID along with hydralazine 50 mg PO TID and will plan to increase hydralazine to 100 mg PO TID today  Patient said that she is also taking hydralazine twice a day and not 3 times and discussed compliance with medication in length with patient today  Patient was also advised to follow low-salt diet  3   Vitamin-D deficiency  Patient was found to have low vitamin-D level and was prescribed 8 week course of ergocalciferol  Currently patient is not taking any vitamin-D supplementation  Plan to check vitamin-D level with the next visit  Currently calcium level is at goal     Returns to Nephrology Clinic in 9 months  Plan to check CBC, BMP, vitamin-D, urine analysis along with urine microalbumin to creatinine ratio before next visit        Portions of the record may have been created with voice recognition software  Occasional wrong word or "sound a like" substitutions may have occurred due to the inherent limitations of voice recognition software  Read the chart carefully and recognize, using context, where substitutions have occurred  If you have any questions, please contact the dictating provider

## 2019-01-16 DIAGNOSIS — N18.2 HYPERTENSIVE KIDNEY DISEASE WITH STAGE 2 CHRONIC KIDNEY DISEASE: ICD-10-CM

## 2019-01-16 DIAGNOSIS — I10 HYPERTENSION, UNSPECIFIED TYPE: ICD-10-CM

## 2019-01-16 DIAGNOSIS — I12.9 HYPERTENSIVE KIDNEY DISEASE WITH STAGE 2 CHRONIC KIDNEY DISEASE: ICD-10-CM

## 2019-01-17 RX ORDER — CARVEDILOL 25 MG/1
25 TABLET ORAL 2 TIMES DAILY WITH MEALS
Qty: 180 TABLET | Refills: 0 | Status: SHIPPED | OUTPATIENT
Start: 2019-01-17 | End: 2020-03-07 | Stop reason: SDUPTHER

## 2019-01-17 RX ORDER — HYDRALAZINE HYDROCHLORIDE 100 MG/1
100 TABLET, FILM COATED ORAL 3 TIMES DAILY
Qty: 270 TABLET | Refills: 0 | Status: SHIPPED | OUTPATIENT
Start: 2019-01-17 | End: 2020-01-22 | Stop reason: SDUPTHER

## 2019-01-17 NOTE — TELEPHONE ENCOUNTER
From: Nicolette Rojas  Sent: 1/16/2019 9:15 PM EST  Subject: Medication Renewal Request    Nicolette Rojas would like a refill of the following medications:     carvedilol (COREG) 25 mg tablet Marie Garcia MD]     hydrALAZINE (APRESOLINE) 100 MG tablet Marie Garcia MD]    Preferred pharmacy: [id number]    Comment:

## 2019-02-15 ENCOUNTER — OFFICE VISIT (OUTPATIENT)
Dept: INTERNAL MEDICINE CLINIC | Facility: CLINIC | Age: 54
End: 2019-02-15
Payer: COMMERCIAL

## 2019-02-15 ENCOUNTER — APPOINTMENT (OUTPATIENT)
Dept: LAB | Facility: CLINIC | Age: 54
End: 2019-02-15
Payer: COMMERCIAL

## 2019-02-15 VITALS
SYSTOLIC BLOOD PRESSURE: 132 MMHG | BODY MASS INDEX: 31.15 KG/M2 | RESPIRATION RATE: 16 BRPM | HEART RATE: 70 BPM | DIASTOLIC BLOOD PRESSURE: 90 MMHG | WEIGHT: 175.8 LBS | HEIGHT: 63 IN | TEMPERATURE: 98.4 F | OXYGEN SATURATION: 98 %

## 2019-02-15 DIAGNOSIS — L81.8 PERIORBITAL HYPERPIGMENTATION: ICD-10-CM

## 2019-02-15 DIAGNOSIS — I10 ESSENTIAL HYPERTENSION: ICD-10-CM

## 2019-02-15 DIAGNOSIS — R51.9 FACIAL PAIN: ICD-10-CM

## 2019-02-15 DIAGNOSIS — L81.8 PERIORBITAL HYPERPIGMENTATION: Primary | ICD-10-CM

## 2019-02-15 DIAGNOSIS — N18.2 CHRONIC KIDNEY DISEASE, STAGE 2 (MILD): ICD-10-CM

## 2019-02-15 LAB
ALBUMIN SERPL BCP-MCNC: 3.8 G/DL (ref 3.5–5)
ALP SERPL-CCNC: 81 U/L (ref 46–116)
ALT SERPL W P-5'-P-CCNC: 24 U/L (ref 12–78)
ANION GAP SERPL CALCULATED.3IONS-SCNC: 7 MMOL/L (ref 4–13)
AST SERPL W P-5'-P-CCNC: 14 U/L (ref 5–45)
BASOPHILS # BLD AUTO: 0.02 THOUSANDS/ΜL (ref 0–0.1)
BASOPHILS NFR BLD AUTO: 1 % (ref 0–1)
BILIRUB SERPL-MCNC: 0.29 MG/DL (ref 0.2–1)
BUN SERPL-MCNC: 12 MG/DL (ref 5–25)
CALCIUM SERPL-MCNC: 9 MG/DL (ref 8.3–10.1)
CHLORIDE SERPL-SCNC: 106 MMOL/L (ref 100–108)
CO2 SERPL-SCNC: 28 MMOL/L (ref 21–32)
CREAT SERPL-MCNC: 0.98 MG/DL (ref 0.6–1.3)
EOSINOPHIL # BLD AUTO: 0.05 THOUSAND/ΜL (ref 0–0.61)
EOSINOPHIL NFR BLD AUTO: 1 % (ref 0–6)
ERYTHROCYTE [DISTWIDTH] IN BLOOD BY AUTOMATED COUNT: 13.7 % (ref 11.6–15.1)
ERYTHROCYTE [SEDIMENTATION RATE] IN BLOOD: 13 MM/HOUR (ref 0–20)
GFR SERPL CREATININE-BSD FRML MDRD: 75 ML/MIN/1.73SQ M
GLUCOSE P FAST SERPL-MCNC: 117 MG/DL (ref 65–99)
HCT VFR BLD AUTO: 42 % (ref 34.8–46.1)
HGB BLD-MCNC: 13.3 G/DL (ref 11.5–15.4)
IMM GRANULOCYTES # BLD AUTO: 0.01 THOUSAND/UL (ref 0–0.2)
IMM GRANULOCYTES NFR BLD AUTO: 0 % (ref 0–2)
INR PPP: 0.92 (ref 0.86–1.17)
LYMPHOCYTES # BLD AUTO: 2.09 THOUSANDS/ΜL (ref 0.6–4.47)
LYMPHOCYTES NFR BLD AUTO: 49 % (ref 14–44)
MCH RBC QN AUTO: 26.9 PG (ref 26.8–34.3)
MCHC RBC AUTO-ENTMCNC: 31.7 G/DL (ref 31.4–37.4)
MCV RBC AUTO: 85 FL (ref 82–98)
MONOCYTES # BLD AUTO: 0.36 THOUSAND/ΜL (ref 0.17–1.22)
MONOCYTES NFR BLD AUTO: 9 % (ref 4–12)
NEUTROPHILS # BLD AUTO: 1.66 THOUSANDS/ΜL (ref 1.85–7.62)
NEUTS SEG NFR BLD AUTO: 40 % (ref 43–75)
NRBC BLD AUTO-RTO: 0 /100 WBCS
PLATELET # BLD AUTO: 304 THOUSANDS/UL (ref 149–390)
PMV BLD AUTO: 11.3 FL (ref 8.9–12.7)
POTASSIUM SERPL-SCNC: 3.6 MMOL/L (ref 3.5–5.3)
PROT SERPL-MCNC: 7.7 G/DL (ref 6.4–8.2)
PROTHROMBIN TIME: 12.5 SECONDS (ref 11.8–14.2)
RBC # BLD AUTO: 4.95 MILLION/UL (ref 3.81–5.12)
SODIUM SERPL-SCNC: 141 MMOL/L (ref 136–145)
TSH SERPL DL<=0.05 MIU/L-ACNC: 0.48 UIU/ML (ref 0.36–3.74)
WBC # BLD AUTO: 4.19 THOUSAND/UL (ref 4.31–10.16)

## 2019-02-15 PROCEDURE — 85652 RBC SED RATE AUTOMATED: CPT

## 2019-02-15 PROCEDURE — 36415 COLL VENOUS BLD VENIPUNCTURE: CPT

## 2019-02-15 PROCEDURE — 80053 COMPREHEN METABOLIC PANEL: CPT

## 2019-02-15 PROCEDURE — 3008F BODY MASS INDEX DOCD: CPT | Performed by: PHYSICIAN ASSISTANT

## 2019-02-15 PROCEDURE — 85025 COMPLETE CBC W/AUTO DIFF WBC: CPT

## 2019-02-15 PROCEDURE — 99214 OFFICE O/P EST MOD 30 MIN: CPT | Performed by: PHYSICIAN ASSISTANT

## 2019-02-15 PROCEDURE — 84443 ASSAY THYROID STIM HORMONE: CPT

## 2019-02-15 PROCEDURE — 86038 ANTINUCLEAR ANTIBODIES: CPT

## 2019-02-15 PROCEDURE — 85610 PROTHROMBIN TIME: CPT

## 2019-02-15 NOTE — PROGRESS NOTES
Assessment/Plan:  Dark pigmentation under both of her eyes  Some slight vague pressure feeling the bridge of her nose  This may be a family trait but will work it up she feels well otherwise with no other complaints she will follow-up in a few months with Derm if her symptoms become worse or change       Diagnoses and all orders for this visit:    Periorbital hyperpigmentation  -     CBC and differential; Future  -     Comprehensive metabolic panel; Future  -     TSH, 3rd generation; Future  -     Sedimentation rate, automated; Future  -     MAURA Screen w/ Reflex to Titer/Pattern; Future  -     Protime-INR; Future  -     CT facial bones wo contrast; Future    Facial pain  -     CBC and differential; Future  -     Comprehensive metabolic panel; Future  -     TSH, 3rd generation; Future  -     Sedimentation rate, automated; Future  -     MAURA Screen w/ Reflex to Titer/Pattern; Future  -     Protime-INR; Future  -     CT facial bones wo contrast; Future    Essential hypertension    Chronic kidney disease, stage 2 (mild)        No problem-specific Assessment & Plan notes found for this encounter  Subjective:      Patient ID: Edyta Barlow is a 52 y o  female  She has noted dark pink mentation under both of her eyes over the past several months sometimes is more prominent than other times  Otherwise completely asymptomatic she notices that 1 of her children might have this characteristic  Her parents do not over the past 2 weeks she has felt a slight pressure feeling at the bridge of her nose no change in her vision no other changes in her pigmentation normally active and well no other rash      The following portions of the patient's history were reviewed and updated as appropriate:   She has no past medical history on file  ,  does not have any pertinent problems on file  ,   has no past surgical history on file  ,  family history includes Alzheimer's disease in her father; Diabetes in her mother; Heart disease in her mother; Hypertension in her father  ,   reports that she has never smoked  She has never used smokeless tobacco  She reports that she drinks alcohol  She reports that she does not use drugs  ,  has No Known Allergies     Current Outpatient Medications   Medication Sig Dispense Refill    aspirin (ECOTRIN LOW STRENGTH) 81 mg EC tablet Take 81 mg by mouth daily      carvedilol (COREG) 25 mg tablet Take 1 tablet (25 mg total) by mouth 2 (two) times a day with meals for 90 days 180 tablet 0    hydrALAZINE (APRESOLINE) 100 MG tablet Take 1 tablet (100 mg total) by mouth 3 (three) times a day for 90 days 270 tablet 0     No current facility-administered medications for this visit  Review of Systems   Constitutional: Negative for activity change, appetite change, chills, diaphoresis, fatigue, fever and unexpected weight change  HENT: Negative for congestion, dental problem, drooling, ear discharge, ear pain, facial swelling, hearing loss, nosebleeds, postnasal drip, rhinorrhea, sinus pressure, sinus pain, sneezing, sore throat, tinnitus, trouble swallowing and voice change  Eyes: Negative for photophobia, pain, discharge, redness, itching and visual disturbance  Respiratory: Negative for apnea, cough, choking, chest tightness, shortness of breath, wheezing and stridor  Cardiovascular: Negative for chest pain, palpitations and leg swelling  Gastrointestinal: Negative for abdominal distention, abdominal pain, anal bleeding, blood in stool, constipation, diarrhea, nausea, rectal pain and vomiting  Endocrine: Negative for cold intolerance, heat intolerance, polydipsia, polyphagia and polyuria  Genitourinary: Negative for decreased urine volume, difficulty urinating, dysuria, enuresis, flank pain, frequency, genital sores, hematuria and urgency  Musculoskeletal: Negative for arthralgias, back pain, gait problem, joint swelling, myalgias, neck pain and neck stiffness     Skin: Positive for color change  Negative for pallor, rash and wound  Neurological: Negative for dizziness, tremors, seizures, syncope, facial asymmetry, speech difficulty, weakness, light-headedness, numbness and headaches  Hematological: Negative for adenopathy  Does not bruise/bleed easily  Psychiatric/Behavioral: Negative for agitation, behavioral problems, confusion, decreased concentration, dysphoric mood, hallucinations, self-injury, sleep disturbance and suicidal ideas  The patient is not nervous/anxious and is not hyperactive  Objective:  Vitals:    02/15/19 0820   BP: 132/90   BP Location: Left arm   Patient Position: Sitting   Pulse: 70   Resp: 16   Temp: 98 4 °F (36 9 °C)   SpO2: 98%   Weight: 79 7 kg (175 lb 12 8 oz)   Height: 5' 2 5" (1 588 m)     Body mass index is 31 64 kg/m²  Physical Exam   Constitutional: She is oriented to person, place, and time  She appears well-developed  HENT:   Head: Normocephalic  Right Ear: External ear normal    Left Ear: External ear normal    Mouth/Throat: Oropharynx is clear and moist    Eyes: Pupils are equal, round, and reactive to light  Conjunctivae are normal    Neck: Neck supple  No thyromegaly present  Cardiovascular: Normal rate, regular rhythm, normal heart sounds and intact distal pulses  Pulmonary/Chest: Effort normal and breath sounds normal    Abdominal: Soft  Bowel sounds are normal    Musculoskeletal: Normal range of motion  Neurological: She is alert and oriented to person, place, and time  She has normal reflexes  Skin: Skin is warm and dry     Darker pigmentation under her eyes to the top of her zygoma fairly symmetric no other pigment changes of her skin or above her eyes no other bruises or rashes

## 2019-02-18 ENCOUNTER — TELEPHONE (OUTPATIENT)
Dept: INTERNAL MEDICINE CLINIC | Facility: CLINIC | Age: 54
End: 2019-02-18

## 2019-02-18 LAB — RYE IGE QN: NEGATIVE

## 2019-02-18 NOTE — TELEPHONE ENCOUNTER
XENAI: Courtney Smith    Patient's medical is looking for a r/o  Are you trying to r/o sinusitis? Please let me know  The case is in dr maren  She's scheduled for Thurs 2/21 for the CT at [facility]      Thank you    Maria Fernanda Chan

## 2019-02-21 ENCOUNTER — HOSPITAL ENCOUNTER (OUTPATIENT)
Dept: CT IMAGING | Facility: HOSPITAL | Age: 54
Discharge: HOME | End: 2019-02-21
Payer: COMMERCIAL

## 2019-02-21 DIAGNOSIS — R51.9 FACIAL PAIN: ICD-10-CM

## 2019-02-21 DIAGNOSIS — L81.8 PERIORBITAL HYPERPIGMENTATION: ICD-10-CM

## 2019-02-21 PROCEDURE — 70486 CT MAXILLOFACIAL W/O DYE: CPT

## 2019-03-04 ENCOUNTER — PATIENT OUTREACH (OUTPATIENT)
Dept: INTERNAL MEDICINE CLINIC | Facility: CLINIC | Age: 54
End: 2019-03-04

## 2019-03-04 DIAGNOSIS — Z71.89 COMPLEX CARE COORDINATION: Primary | ICD-10-CM

## 2019-03-05 ENCOUNTER — OFFICE VISIT (OUTPATIENT)
Dept: INTERNAL MEDICINE CLINIC | Facility: CLINIC | Age: 54
End: 2019-03-05
Payer: COMMERCIAL

## 2019-03-05 VITALS
DIASTOLIC BLOOD PRESSURE: 110 MMHG | SYSTOLIC BLOOD PRESSURE: 150 MMHG | WEIGHT: 173.4 LBS | HEART RATE: 74 BPM | OXYGEN SATURATION: 98 % | BODY MASS INDEX: 31.21 KG/M2

## 2019-03-05 DIAGNOSIS — Z12.11 COLON CANCER SCREENING: ICD-10-CM

## 2019-03-05 DIAGNOSIS — I12.9 HYPERTENSIVE KIDNEY DISEASE WITH STAGE 2 CHRONIC KIDNEY DISEASE: ICD-10-CM

## 2019-03-05 DIAGNOSIS — I10 ESSENTIAL HYPERTENSION: Primary | ICD-10-CM

## 2019-03-05 DIAGNOSIS — N18.2 HYPERTENSIVE KIDNEY DISEASE WITH STAGE 2 CHRONIC KIDNEY DISEASE: ICD-10-CM

## 2019-03-05 DIAGNOSIS — Z12.39 BREAST SCREENING: ICD-10-CM

## 2019-03-05 DIAGNOSIS — I34.0 MILD MITRAL REGURGITATION BY PRIOR ECHOCARDIOGRAPHY: ICD-10-CM

## 2019-03-05 PROCEDURE — 99213 OFFICE O/P EST LOW 20 MIN: CPT | Performed by: INTERNAL MEDICINE

## 2019-03-05 PROCEDURE — 1036F TOBACCO NON-USER: CPT | Performed by: INTERNAL MEDICINE

## 2019-03-05 RX ORDER — HYDROCHLOROTHIAZIDE 12.5 MG/1
12.5 TABLET ORAL DAILY
Qty: 90 TABLET | Refills: 3 | Status: SHIPPED | OUTPATIENT
Start: 2019-03-05 | End: 2020-03-07

## 2019-03-05 NOTE — PROGRESS NOTES
Assessment/Plan:       Diagnoses and all orders for this visit:    Essential hypertension  -     hydrochlorothiazide (HYDRODIURIL) 12 5 mg tablet; Take 1 tablet (12 5 mg total) by mouth daily    Mild mitral regurgitation by prior echocardiography    Hypertensive kidney disease with stage 2 chronic kidney disease    Colon cancer screening  -     Cologuard; Future    Breast screening  -     Mammo screening bilateral w cad; Future          Patient Instructions   Skin discoloration under her eyes-no sign of any other chronic diagnoses that needs to be of concern  Blood pressure not well controlled currently taking carvedilol 25 twice a day and Apresoline 100 t i d  Recommend addition of hydrochlorothiazide 12 5 mg daily  Monitor blood pressure at home plus a week  6 week follow-up  [place] guard in light of normal colonoscopy 5 years ago  Mammogram normal in May 2018; recheck in May 2019        Subjective:      Patient ID: Nicolette Rojas is a 52 y o  female  Essential hypertension with inadequate control  The patient had been seen by the PA about a month ago  She was concerned about skin discoloration under both eyes  Exam was otherwise normal   Chronic diagnoses were noted of essential hypertension  Laboratory testing was ordered and is unremarkable  Returns for follow-up today  In the past, reports that have CKD stage 2/Iii but current estimated GFR is 75  The following portions of the patient's history were reviewed and updated as appropriate:   She has no past medical history on file  ,  does not have any pertinent problems on file  ,   has no past surgical history on file  ,  family history includes Alzheimer's disease in her father; Diabetes in her mother; Heart disease in her mother; Hypertension in her father  ,   reports that she has never smoked  She has never used smokeless tobacco  She reports that she drinks alcohol  She reports that she does not use drugs  ,  has No Known Allergies     Current Outpatient Medications   Medication Sig Dispense Refill    aspirin (ECOTRIN LOW STRENGTH) 81 mg EC tablet Take 81 mg by mouth daily      carvedilol (COREG) 25 mg tablet Take 1 tablet (25 mg total) by mouth 2 (two) times a day with meals for 90 days 180 tablet 0    hydrALAZINE (APRESOLINE) 100 MG tablet Take 1 tablet (100 mg total) by mouth 3 (three) times a day for 90 days 270 tablet 0    hydrochlorothiazide (HYDRODIURIL) 12 5 mg tablet Take 1 tablet (12 5 mg total) by mouth daily 90 tablet 3     No current facility-administered medications for this visit  Review of Systems   Constitutional: Negative for chills and fever  HENT: Negative for sore throat and trouble swallowing  Eyes: Negative for pain  Respiratory: Negative for cough, shortness of breath and wheezing  Cardiovascular: Negative for chest pain and leg swelling  Gastrointestinal: Negative for abdominal pain, diarrhea, nausea and vomiting  Endocrine: Negative for cold intolerance and heat intolerance  Genitourinary: Negative for dysuria, frequency and pelvic pain  Musculoskeletal: Negative for arthralgias and joint swelling  Skin: Negative for rash and wound  Allergic/Immunologic: Negative for immunocompromised state  Neurological: Negative for dizziness, seizures, syncope and headaches  Psychiatric/Behavioral: Negative for dysphoric mood  The patient is not nervous/anxious  Objective:  Vitals:    03/05/19 0919   BP: (!) 150/110   Pulse: 74   SpO2: 98%      Physical Exam   Constitutional: She is oriented to person, place, and time  She appears well-developed and well-nourished  HENT:   Head: Normocephalic and atraumatic  Eyes: Pupils are equal, round, and reactive to light  EOM are normal    Neck: Normal range of motion  Neck supple  No tracheal deviation present  No thyromegaly present  Cardiovascular: Normal rate, regular rhythm and normal heart sounds  Exam reveals no gallop     No murmur heard  Pulmonary/Chest: No respiratory distress  She has no wheezes  She has no rales  Abdominal: Soft  Bowel sounds are normal  There is no tenderness  Musculoskeletal: Normal range of motion  She exhibits no tenderness or deformity  Neurological: She is alert and oriented to person, place, and time  Coordination normal    Skin: Skin is warm  Psychiatric: She has a normal mood and affect   Judgment normal

## 2019-03-05 NOTE — PATIENT INSTRUCTIONS
Skin discoloration under her eyes-no sign of any other chronic diagnoses that needs to be of concern  Blood pressure not well controlled currently taking carvedilol 25 twice a day and Apresoline 100 t i d  Recommend addition of hydrochlorothiazide 12 5 mg daily  Monitor blood pressure at home plus a week  6 week follow-up      [place] guard in light of normal colonoscopy 5 years ago  Mammogram normal in May 2018; recheck in May 2019

## 2019-03-11 ENCOUNTER — PATIENT OUTREACH (OUTPATIENT)
Dept: INTERNAL MEDICINE CLINIC | Facility: CLINIC | Age: 54
End: 2019-03-11

## 2019-03-19 ENCOUNTER — PATIENT OUTREACH (OUTPATIENT)
Dept: INTERNAL MEDICINE CLINIC | Facility: CLINIC | Age: 54
End: 2019-03-19

## 2019-03-19 NOTE — PROGRESS NOTES
Tiffanie Solis stated that she has been well  Denies pain, SOB, swelling  Stated that her blood pressure has been stable  Does monitor blood pressure at home  Has all her medications and has been tolerating them  Did discuss good medication management and importance of having a good regime  She stated she manages her own medications  Has a good routine set and remembers to take them as prescribed  Denied having financial hardships at this time  Stated she lives with her  and stated she has good emotional support at home  Denies needing assistance with health at home  Reviewed over s/s of fluid overload  Also reviewed over red flag symptoms  Stated that her goal is to try to eat better and have a healthier lifestyle by exercising  She is up to date with PCP and up to date with routine labs  Has her mammogram scheduled on 5/28/19  She stated she wrote down date and time  Introduced her to care management  She is in agreement  Will f/u

## 2019-03-26 ENCOUNTER — TELEPHONE (OUTPATIENT)
Dept: INTERNAL MEDICINE CLINIC | Facility: CLINIC | Age: 54
End: 2019-03-26

## 2019-03-26 NOTE — TELEPHONE ENCOUNTER
----- Message from Roberto Hernández MD sent at 3/26/2019  4:09 PM EDT -----  Please call the patient regarding her  result    Normal colo guard

## 2019-04-04 DIAGNOSIS — I10 HYPERTENSION, UNSPECIFIED TYPE: ICD-10-CM

## 2019-04-04 RX ORDER — CARVEDILOL 25 MG/1
25 TABLET ORAL 2 TIMES DAILY WITH MEALS
Qty: 180 TABLET | Refills: 2 | Status: SHIPPED | OUTPATIENT
Start: 2019-04-04 | End: 2019-04-16

## 2019-04-16 ENCOUNTER — APPOINTMENT (OUTPATIENT)
Dept: LAB | Facility: CLINIC | Age: 54
End: 2019-04-16
Payer: COMMERCIAL

## 2019-04-16 ENCOUNTER — OFFICE VISIT (OUTPATIENT)
Dept: INTERNAL MEDICINE CLINIC | Facility: CLINIC | Age: 54
End: 2019-04-16
Payer: COMMERCIAL

## 2019-04-16 VITALS
HEIGHT: 63 IN | BODY MASS INDEX: 30.65 KG/M2 | SYSTOLIC BLOOD PRESSURE: 139 MMHG | HEART RATE: 78 BPM | DIASTOLIC BLOOD PRESSURE: 82 MMHG | OXYGEN SATURATION: 97 % | WEIGHT: 173 LBS

## 2019-04-16 DIAGNOSIS — N18.2 HYPERTENSIVE KIDNEY DISEASE WITH STAGE 2 CHRONIC KIDNEY DISEASE: ICD-10-CM

## 2019-04-16 DIAGNOSIS — N18.2 HYPERTENSIVE KIDNEY DISEASE WITH STAGE 2 CHRONIC KIDNEY DISEASE: Primary | ICD-10-CM

## 2019-04-16 DIAGNOSIS — I12.9 HYPERTENSIVE KIDNEY DISEASE WITH STAGE 2 CHRONIC KIDNEY DISEASE: ICD-10-CM

## 2019-04-16 DIAGNOSIS — I12.9 HYPERTENSIVE KIDNEY DISEASE WITH STAGE 2 CHRONIC KIDNEY DISEASE: Primary | ICD-10-CM

## 2019-04-16 DIAGNOSIS — I10 ESSENTIAL HYPERTENSION: ICD-10-CM

## 2019-04-16 LAB
ANION GAP SERPL CALCULATED.3IONS-SCNC: 5 MMOL/L (ref 4–13)
BUN SERPL-MCNC: 12 MG/DL (ref 5–25)
CALCIUM SERPL-MCNC: 8.9 MG/DL (ref 8.3–10.1)
CHLORIDE SERPL-SCNC: 107 MMOL/L (ref 100–108)
CO2 SERPL-SCNC: 31 MMOL/L (ref 21–32)
CREAT SERPL-MCNC: 1.05 MG/DL (ref 0.6–1.3)
GFR SERPL CREATININE-BSD FRML MDRD: 69 ML/MIN/1.73SQ M
GLUCOSE P FAST SERPL-MCNC: 128 MG/DL (ref 65–99)
POTASSIUM SERPL-SCNC: 3.4 MMOL/L (ref 3.5–5.3)
SODIUM SERPL-SCNC: 143 MMOL/L (ref 136–145)

## 2019-04-16 PROCEDURE — 3008F BODY MASS INDEX DOCD: CPT | Performed by: INTERNAL MEDICINE

## 2019-04-16 PROCEDURE — 3079F DIAST BP 80-89 MM HG: CPT | Performed by: INTERNAL MEDICINE

## 2019-04-16 PROCEDURE — 3075F SYST BP GE 130 - 139MM HG: CPT | Performed by: INTERNAL MEDICINE

## 2019-04-16 PROCEDURE — 36415 COLL VENOUS BLD VENIPUNCTURE: CPT

## 2019-04-16 PROCEDURE — 99213 OFFICE O/P EST LOW 20 MIN: CPT | Performed by: INTERNAL MEDICINE

## 2019-04-16 PROCEDURE — 80048 BASIC METABOLIC PNL TOTAL CA: CPT

## 2019-04-19 ENCOUNTER — PATIENT OUTREACH (OUTPATIENT)
Dept: INTERNAL MEDICINE CLINIC | Facility: CLINIC | Age: 54
End: 2019-04-19

## 2019-05-20 ENCOUNTER — PATIENT OUTREACH (OUTPATIENT)
Dept: INTERNAL MEDICINE CLINIC | Facility: CLINIC | Age: 54
End: 2019-05-20

## 2019-05-28 ENCOUNTER — HOSPITAL ENCOUNTER (OUTPATIENT)
Dept: MAMMOGRAPHY | Facility: CLINIC | Age: 54
Discharge: HOME | End: 2019-05-28
Payer: COMMERCIAL

## 2019-05-28 VITALS — HEIGHT: 62 IN | BODY MASS INDEX: 32.2 KG/M2 | WEIGHT: 175 LBS

## 2019-05-28 DIAGNOSIS — Z12.39 BREAST SCREENING: ICD-10-CM

## 2019-05-28 PROCEDURE — 77067 SCR MAMMO BI INCL CAD: CPT

## 2019-05-28 PROCEDURE — 77063 BREAST TOMOSYNTHESIS BI: CPT

## 2019-06-21 ENCOUNTER — PATIENT OUTREACH (OUTPATIENT)
Dept: INTERNAL MEDICINE CLINIC | Facility: CLINIC | Age: 54
End: 2019-06-21

## 2019-07-16 ENCOUNTER — APPOINTMENT (OUTPATIENT)
Dept: LAB | Facility: HOSPITAL | Age: 54
End: 2019-07-16
Attending: INTERNAL MEDICINE
Payer: COMMERCIAL

## 2019-07-16 DIAGNOSIS — N18.2 CHRONIC KIDNEY DISEASE, STAGE 2 (MILD): ICD-10-CM

## 2019-07-16 DIAGNOSIS — E55.9 VITAMIN D DEFICIENCY: ICD-10-CM

## 2019-07-16 DIAGNOSIS — N18.2 HYPERTENSIVE KIDNEY DISEASE WITH STAGE 2 CHRONIC KIDNEY DISEASE: ICD-10-CM

## 2019-07-16 DIAGNOSIS — I12.9 HYPERTENSIVE KIDNEY DISEASE WITH STAGE 2 CHRONIC KIDNEY DISEASE: ICD-10-CM

## 2019-07-16 LAB
25(OH)D3 SERPL-MCNC: 40.8 NG/ML (ref 30–100)
ANION GAP SERPL CALCULATED.3IONS-SCNC: 8 MMOL/L (ref 4–13)
BACTERIA UR QL AUTO: ABNORMAL /HPF
BASOPHILS # BLD AUTO: 0.01 THOUSANDS/ΜL (ref 0–0.1)
BASOPHILS NFR BLD AUTO: 0 % (ref 0–1)
BILIRUB UR QL STRIP: NEGATIVE
BUN SERPL-MCNC: 10 MG/DL (ref 5–25)
CALCIUM SERPL-MCNC: 9.5 MG/DL (ref 8.3–10.1)
CHLORIDE SERPL-SCNC: 104 MMOL/L (ref 100–108)
CLARITY UR: CLEAR
CO2 SERPL-SCNC: 31 MMOL/L (ref 21–32)
COLOR UR: YELLOW
CREAT SERPL-MCNC: 0.97 MG/DL (ref 0.6–1.3)
CREAT UR-MCNC: 130 MG/DL
EOSINOPHIL # BLD AUTO: 0.05 THOUSAND/ΜL (ref 0–0.61)
EOSINOPHIL NFR BLD AUTO: 1 % (ref 0–6)
ERYTHROCYTE [DISTWIDTH] IN BLOOD BY AUTOMATED COUNT: 13.1 % (ref 11.6–15.1)
GFR SERPL CREATININE-BSD FRML MDRD: 76 ML/MIN/1.73SQ M
GLUCOSE SERPL-MCNC: 85 MG/DL (ref 65–140)
GLUCOSE UR STRIP-MCNC: NEGATIVE MG/DL
HCT VFR BLD AUTO: 38.7 % (ref 34.8–46.1)
HGB BLD-MCNC: 12.5 G/DL (ref 11.5–15.4)
HGB UR QL STRIP.AUTO: NEGATIVE
IMM GRANULOCYTES # BLD AUTO: 0.01 THOUSAND/UL (ref 0–0.2)
IMM GRANULOCYTES NFR BLD AUTO: 0 % (ref 0–2)
KETONES UR STRIP-MCNC: NEGATIVE MG/DL
LEUKOCYTE ESTERASE UR QL STRIP: ABNORMAL
LYMPHOCYTES # BLD AUTO: 2.55 THOUSANDS/ΜL (ref 0.6–4.47)
LYMPHOCYTES NFR BLD AUTO: 58 % (ref 14–44)
MCH RBC QN AUTO: 26.8 PG (ref 26.8–34.3)
MCHC RBC AUTO-ENTMCNC: 32.3 G/DL (ref 31.4–37.4)
MCV RBC AUTO: 83 FL (ref 82–98)
MICROALBUMIN UR-MCNC: 12.2 MG/L (ref 0–20)
MICROALBUMIN/CREAT 24H UR: 9 MG/G CREATININE (ref 0–30)
MONOCYTES # BLD AUTO: 0.36 THOUSAND/ΜL (ref 0.17–1.22)
MONOCYTES NFR BLD AUTO: 8 % (ref 4–12)
NEUTROPHILS # BLD AUTO: 1.45 THOUSANDS/ΜL (ref 1.85–7.62)
NEUTS SEG NFR BLD AUTO: 33 % (ref 43–75)
NITRITE UR QL STRIP: NEGATIVE
NON-SQ EPI CELLS URNS QL MICRO: ABNORMAL /HPF
NRBC BLD AUTO-RTO: 0 /100 WBCS
PH UR STRIP.AUTO: 7 [PH]
PLATELET # BLD AUTO: 302 THOUSANDS/UL (ref 149–390)
PMV BLD AUTO: 10.8 FL (ref 8.9–12.7)
POTASSIUM SERPL-SCNC: 3.1 MMOL/L (ref 3.5–5.3)
PROT UR STRIP-MCNC: NEGATIVE MG/DL
RBC # BLD AUTO: 4.66 MILLION/UL (ref 3.81–5.12)
RBC #/AREA URNS AUTO: ABNORMAL /HPF
SODIUM SERPL-SCNC: 143 MMOL/L (ref 136–145)
SP GR UR STRIP.AUTO: 1.01 (ref 1–1.03)
UROBILINOGEN UR QL STRIP.AUTO: 0.2 E.U./DL
WBC # BLD AUTO: 4.43 THOUSAND/UL (ref 4.31–10.16)
WBC #/AREA URNS AUTO: ABNORMAL /HPF

## 2019-07-16 PROCEDURE — 85025 COMPLETE CBC W/AUTO DIFF WBC: CPT

## 2019-07-16 PROCEDURE — 36415 COLL VENOUS BLD VENIPUNCTURE: CPT

## 2019-07-16 PROCEDURE — 82570 ASSAY OF URINE CREATININE: CPT

## 2019-07-16 PROCEDURE — 81001 URINALYSIS AUTO W/SCOPE: CPT

## 2019-07-16 PROCEDURE — 82306 VITAMIN D 25 HYDROXY: CPT

## 2019-07-16 PROCEDURE — 80048 BASIC METABOLIC PNL TOTAL CA: CPT

## 2019-07-16 PROCEDURE — 82043 UR ALBUMIN QUANTITATIVE: CPT

## 2019-07-18 ENCOUNTER — TELEPHONE (OUTPATIENT)
Dept: NEPHROLOGY | Facility: CLINIC | Age: 54
End: 2019-07-18

## 2019-07-18 NOTE — TELEPHONE ENCOUNTER
She called and said she would be unable to make the 4:15 pm appt for today she is stuck at work  She said she went and had the blood work done and she is able to see the results via [employer]  She is wondering if you would be able to give her a call to go over a few things further  Also she was wondering if you feel she still needs a follow up appointment or is the verbal phone call enough  Please advise  Her best call back number is [phone]

## 2019-07-18 NOTE — TELEPHONE ENCOUNTER
Called and discussed patient's blood work with her today  Advised to eat food which is high in potassium (discussed food with patient)  Patient will be seeing PCP in the future who will be checking kidney function in the future  All the questions were answered      Sunshine Wallis

## 2019-07-24 ENCOUNTER — PATIENT OUTREACH (OUTPATIENT)
Dept: INTERNAL MEDICINE CLINIC | Facility: CLINIC | Age: 54
End: 2019-07-24

## 2019-07-31 ENCOUNTER — PATIENT OUTREACH (OUTPATIENT)
Dept: INTERNAL MEDICINE CLINIC | Facility: CLINIC | Age: 54
End: 2019-07-31

## 2019-08-07 ENCOUNTER — PATIENT OUTREACH (OUTPATIENT)
Dept: INTERNAL MEDICINE CLINIC | Facility: CLINIC | Age: 54
End: 2019-08-07

## 2019-08-07 NOTE — PROGRESS NOTES
Minerva Baez is currently in stable condition  Feels that she is doing well with her health and management  Is in agreement to closure from complex care-outpatient care management  Is up to date with PCP as well as routine labs  Goals met  Continues to have contact information if need be

## 2019-10-08 ENCOUNTER — APPOINTMENT (EMERGENCY)
Dept: RADIOLOGY | Facility: HOSPITAL | Age: 54
End: 2019-10-08
Payer: COMMERCIAL

## 2019-10-08 ENCOUNTER — HOSPITAL ENCOUNTER (EMERGENCY)
Facility: HOSPITAL | Age: 54
Discharge: HOME | End: 2019-10-08
Attending: EMERGENCY MEDICINE
Payer: COMMERCIAL

## 2019-10-08 VITALS
WEIGHT: 177.03 LBS | SYSTOLIC BLOOD PRESSURE: 169 MMHG | HEART RATE: 52 BPM | RESPIRATION RATE: 20 BRPM | OXYGEN SATURATION: 100 % | BODY MASS INDEX: 32.58 KG/M2 | DIASTOLIC BLOOD PRESSURE: 82 MMHG | TEMPERATURE: 98.3 F | HEIGHT: 62 IN

## 2019-10-08 DIAGNOSIS — R07.9 CHEST PAIN: Primary | ICD-10-CM

## 2019-10-08 LAB
ALBUMIN SERPL BCP-MCNC: 3.9 G/DL (ref 3.5–5)
ALP SERPL-CCNC: 70 U/L (ref 46–116)
ALT SERPL W P-5'-P-CCNC: 19 U/L (ref 12–78)
ANION GAP SERPL CALCULATED.3IONS-SCNC: 6 MMOL/L (ref 4–13)
AST SERPL W P-5'-P-CCNC: 15 U/L (ref 5–45)
ATRIAL RATE: 64 BPM
BASOPHILS # BLD AUTO: 0.02 THOUSANDS/ΜL (ref 0–0.1)
BASOPHILS NFR BLD AUTO: 0 % (ref 0–1)
BILIRUB SERPL-MCNC: 0.2 MG/DL (ref 0.2–1)
BUN SERPL-MCNC: 9 MG/DL (ref 5–25)
CALCIUM SERPL-MCNC: 9.2 MG/DL (ref 8.3–10.1)
CHLORIDE SERPL-SCNC: 102 MMOL/L (ref 100–108)
CO2 SERPL-SCNC: 32 MMOL/L (ref 21–32)
CREAT SERPL-MCNC: 0.91 MG/DL (ref 0.6–1.3)
EOSINOPHIL # BLD AUTO: 0.06 THOUSAND/ΜL (ref 0–0.61)
EOSINOPHIL NFR BLD AUTO: 1 % (ref 0–6)
ERYTHROCYTE [DISTWIDTH] IN BLOOD BY AUTOMATED COUNT: 12.8 % (ref 11.6–15.1)
GFR SERPL CREATININE-BSD FRML MDRD: 82 ML/MIN/1.73SQ M
GLUCOSE SERPL-MCNC: 97 MG/DL (ref 65–140)
HCT VFR BLD AUTO: 40.9 % (ref 34.8–46.1)
HGB BLD-MCNC: 13.4 G/DL (ref 11.5–15.4)
IMM GRANULOCYTES # BLD AUTO: 0 THOUSAND/UL (ref 0–0.2)
IMM GRANULOCYTES NFR BLD AUTO: 0 % (ref 0–2)
INR PPP: 0.92 (ref 0.84–1.19)
LYMPHOCYTES # BLD AUTO: 2.79 THOUSANDS/ΜL (ref 0.6–4.47)
LYMPHOCYTES NFR BLD AUTO: 60 % (ref 14–44)
MCH RBC QN AUTO: 27.4 PG (ref 26.8–34.3)
MCHC RBC AUTO-ENTMCNC: 32.8 G/DL (ref 31.4–37.4)
MCV RBC AUTO: 84 FL (ref 82–98)
MONOCYTES # BLD AUTO: 0.38 THOUSAND/ΜL (ref 0.17–1.22)
MONOCYTES NFR BLD AUTO: 8 % (ref 4–12)
NEUTROPHILS # BLD AUTO: 1.47 THOUSANDS/ΜL (ref 1.85–7.62)
NEUTS SEG NFR BLD AUTO: 31 % (ref 43–75)
NRBC BLD AUTO-RTO: 0 /100 WBCS
P AXIS: 34 DEGREES
PLATELET # BLD AUTO: 301 THOUSANDS/UL (ref 149–390)
PMV BLD AUTO: 10.5 FL (ref 8.9–12.7)
POTASSIUM SERPL-SCNC: 3.4 MMOL/L (ref 3.5–5.3)
PR INTERVAL: 178 MS
PROT SERPL-MCNC: 7.7 G/DL (ref 6.4–8.2)
PROTHROMBIN TIME: 12.4 SECONDS (ref 11.6–14.5)
QRS AXIS: 24 DEGREES
QRSD INTERVAL: 66 MS
QT INTERVAL: 402 MS
QTC INTERVAL: 414 MS
RBC # BLD AUTO: 4.89 MILLION/UL (ref 3.81–5.12)
SODIUM SERPL-SCNC: 140 MMOL/L (ref 136–145)
T WAVE AXIS: 10 DEGREES
TROPONIN I SERPL-MCNC: <0.02 NG/ML
VENTRICULAR RATE: 64 BPM
WBC # BLD AUTO: 4.72 THOUSAND/UL (ref 4.31–10.16)

## 2019-10-08 PROCEDURE — 84484 ASSAY OF TROPONIN QUANT: CPT | Performed by: EMERGENCY MEDICINE

## 2019-10-08 PROCEDURE — 36415 COLL VENOUS BLD VENIPUNCTURE: CPT | Performed by: EMERGENCY MEDICINE

## 2019-10-08 PROCEDURE — 99285 EMERGENCY DEPT VISIT HI MDM: CPT

## 2019-10-08 PROCEDURE — 85025 COMPLETE CBC W/AUTO DIFF WBC: CPT | Performed by: EMERGENCY MEDICINE

## 2019-10-08 PROCEDURE — 85610 PROTHROMBIN TIME: CPT | Performed by: EMERGENCY MEDICINE

## 2019-10-08 PROCEDURE — 80053 COMPREHEN METABOLIC PANEL: CPT | Performed by: EMERGENCY MEDICINE

## 2019-10-08 PROCEDURE — 99285 EMERGENCY DEPT VISIT HI MDM: CPT | Performed by: EMERGENCY MEDICINE

## 2019-10-08 PROCEDURE — 71046 X-RAY EXAM CHEST 2 VIEWS: CPT

## 2019-10-08 PROCEDURE — 93005 ELECTROCARDIOGRAM TRACING: CPT

## 2019-10-08 PROCEDURE — 93010 ELECTROCARDIOGRAM REPORT: CPT | Performed by: INTERNAL MEDICINE

## 2019-10-08 NOTE — ED PROVIDER NOTES
History  Chief Complaint   Patient presents with    Chest Pain     pt states "I have a sharp, sticking pain in my chest that goes to my back" symptoms started yesterday     Fatigue     fatigue that started yesterday      62 female patient presents emergency department for evaluation of left-sided chest pain  Patient states that her mother had open heart surgery when she was approximately her age, patient states she has had a full cardiac evaluation in the past, she had no abnormalities during last stress test   Patient states currently left-sided chest pressure radiating into her back has been ongoing for greater than 24 hours  The patient will be evaluated with a differential diagnosis includes not limited to pneumonia, acute coronary syndrome, musculoskeletal chest pain  History provided by:  Patient   used: No    Chest Pain   Pain location:  L chest  Pain quality: aching    Pain severity:  Mild  Onset quality:  Gradual  Timing:  Constant  Context: not breathing, no intercourse, not raising an arm and no stress    Relieved by:  Nothing  Worsened by:  Nothing tried  Ineffective treatments:  None tried  Associated symptoms: fatigue    Associated symptoms: no AICD problem, no back pain and no claudication    Fatigue   Associated symptoms: chest pain        Prior to Admission Medications   Prescriptions Last Dose Informant Patient Reported?  Taking?   carvedilol (COREG) 25 mg tablet   No No   Sig: Take 1 tablet (25 mg total) by mouth 2 (two) times a day with meals for 90 days   hydrALAZINE (APRESOLINE) 100 MG tablet   No No   Sig: Take 1 tablet (100 mg total) by mouth 3 (three) times a day for 90 days   hydrochlorothiazide (HYDRODIURIL) 12 5 mg tablet   No No   Sig: Take 1 tablet (12 5 mg total) by mouth daily      Facility-Administered Medications: None       Past Medical History:   Diagnosis Date    Hypertension        Past Surgical History:   Procedure Laterality Date    BREAST CYST EXCISION Left     14years old       Family History   Problem Relation Age of Onset    Diabetes Mother     Heart disease Mother     Alzheimer's disease Father     Hypertension Father     No Known Problems Sister     No Known Problems Sister     No Known Problems Sister     No Known Problems Sister      I have reviewed and agree with the history as documented  Social History     Tobacco Use    Smoking status: Never Smoker    Smokeless tobacco: Never Used   Substance Use Topics    Alcohol use: Yes     Frequency: Monthly or less     Drinks per session: 1 or 2     Binge frequency: Never     Comment: sometimes     Drug use: No        Review of Systems   Constitutional: Positive for fatigue  Cardiovascular: Positive for chest pain  Negative for claudication  Musculoskeletal: Negative for back pain  All other systems reviewed and are negative  Physical Exam  Physical Exam   Constitutional: She is oriented to person, place, and time  She appears well-developed and well-nourished  HENT:   Head: Normocephalic and atraumatic  Right Ear: External ear normal    Left Ear: External ear normal    Eyes: Conjunctivae and EOM are normal    Neck: No JVD present  No tracheal deviation present  No thyromegaly present  Cardiovascular: Normal rate  Pulmonary/Chest: Effort normal and breath sounds normal  No stridor  Abdominal: Soft  She exhibits no distension and no mass  There is no tenderness  There is no guarding  No hernia  Musculoskeletal: Normal range of motion  She exhibits no edema, tenderness or deformity  Lymphadenopathy:     She has no cervical adenopathy  Neurological: She is alert and oriented to person, place, and time  Skin: Skin is warm  No rash noted  No erythema  No pallor  Psychiatric: She has a normal mood and affect  Her behavior is normal    Nursing note and vitals reviewed        Vital Signs  ED Triage Vitals [10/08/19 1715]   Temperature Pulse Respirations Blood Pressure SpO2   98 3 °F (36 8 °C) 73 17 (!) 179/96 97 %      Temp Source Heart Rate Source Patient Position - Orthostatic VS BP Location FiO2 (%)   Oral Monitor Sitting Left arm --      Pain Score       6           Vitals:    10/08/19 1715 10/08/19 1846 10/08/19 1942   BP: (!) 179/96 148/76 169/82   Pulse: 73 64 (!) 52   Patient Position - Orthostatic VS: Sitting Lying Lying         Visual Acuity      ED Medications  Medications - No data to display    Diagnostic Studies  Results Reviewed     Procedure Component Value Units Date/Time    Troponin I [[id number]]  (Normal) Collected:  10/08/19 1901    Lab Status:  Final result Specimen:  Blood from Arm, Left Updated:  10/08/19 1928     Troponin I <0 02 ng/mL     Comprehensive metabolic panel [[id number]]  (Abnormal) Collected:  10/08/19 1901    Lab Status:  Final result Specimen:  Blood from Arm, Left Updated:  10/08/19 1927     Sodium 140 mmol/L      Potassium 3 4 mmol/L      Chloride 102 mmol/L      CO2 32 mmol/L      ANION GAP 6 mmol/L      BUN 9 mg/dL      Creatinine 0 91 mg/dL      Glucose 97 mg/dL      Calcium 9 2 mg/dL      AST 15 U/L      ALT 19 U/L      Alkaline Phosphatase 70 U/L      Total Protein 7 7 g/dL      Albumin 3 9 g/dL      Total Bilirubin 0 20 mg/dL      eGFR 82 ml/min/1 73sq m     Narrative:       [place] guidelines for Chronic Kidney Disease (CKD):     Stage 1 with normal or high GFR (GFR > 90 mL/min/1 73 square meters)    Stage 2 Mild CKD (GFR = 60-89 mL/min/1 73 square meters)    Stage 3A Moderate CKD (GFR = 45-59 mL/min/1 73 square meters)    Stage 3B Moderate CKD (GFR = 30-44 mL/min/1 73 square meters)    Stage 4 Severe CKD (GFR = 15-29 mL/min/1 73 square meters)    Stage 5 End Stage CKD (GFR <15 mL/min/1 73 square meters)  Note: GFR calculation is accurate only with a steady state creatinine    Protime-INR [[id number]]  (Normal) Collected:  10/08/19 1902    Lab Status:  Final result Specimen:  Blood from Arm, Left Updated:  10/08/19 1920     Protime 12 4 seconds      INR 0 92    CBC and differential [[id number]]  (Abnormal) Collected:  10/08/19 1901    Lab Status:  Final result Specimen:  Blood from Arm, Left Updated:  10/08/19 1907     WBC 4 72 Thousand/uL      RBC 4 89 Million/uL      Hemoglobin 13 4 g/dL      Hematocrit 40 9 %      MCV 84 fL      MCH 27 4 pg      MCHC 32 8 g/dL      RDW 12 8 %      MPV 10 5 fL      Platelets 685 Thousands/uL      nRBC 0 /100 WBCs      Neutrophils Relative 31 %      Immat GRANS % 0 %      Lymphocytes Relative 60 %      Monocytes Relative 8 %      Eosinophils Relative 1 %      Basophils Relative 0 %      Neutrophils Absolute 1 47 Thousands/µL      Immature Grans Absolute 0 00 Thousand/uL      Lymphocytes Absolute 2 79 Thousands/µL      Monocytes Absolute 0 38 Thousand/µL      Eosinophils Absolute 0 06 Thousand/µL      Basophils Absolute 0 02 Thousands/µL                  XR chest 2 views    (Results Pending)              Procedures  Procedures       ED Course                               MDM  Number of Diagnoses or Management Options  Chest pain: new and requires workup     Amount and/or Complexity of Data Reviewed  Clinical lab tests: ordered and reviewed  Tests in the radiology section of CPT®: ordered and reviewed  Decide to obtain previous medical records or to obtain history from someone other than the patient: yes  Review and summarize past medical records: yes    Patient Progress  Patient progress: stable      Disposition  Final diagnoses:   Chest pain     Time reflects when diagnosis was documented in both MDM as applicable and the Disposition within this note     Time User Action Codes Description Comment    10/8/2019  8:00 PM Jim Pack Add [R07 9] Chest pain       ED Disposition     ED Disposition Condition Date/Time Comment    Discharge Stable Tue Oct 8, 2019  8:00 PM Ruben Winter discharge to home/self care              Follow-up Information     Follow up With Specialties Details Why Contact Info    Jerri Holguin MD Internal Medicine   [address] [address]  [phone]            Discharge Medication List as of 10/8/2019  8:01 PM      CONTINUE these medications which have NOT CHANGED    Details   carvedilol (COREG) 25 mg tablet Take 1 tablet (25 mg total) by mouth 2 (two) times a day with meals for 90 days, Starting Thu 1/17/2019, Until Wed 4/17/2019, Normal      hydrALAZINE (APRESOLINE) 100 MG tablet Take 1 tablet (100 mg total) by mouth 3 (three) times a day for 90 days, Starting Thu 1/17/2019, Until Wed 4/17/2019, Normal      hydrochlorothiazide (HYDRODIURIL) 12 5 mg tablet Take 1 tablet (12 5 mg total) by mouth daily, Starting Tue 3/5/2019, Normal           No discharge procedures on file      ED Provider  Electronically Signed by           Ezio Monreal DO  10/09/19 0202

## 2019-10-21 DIAGNOSIS — N18.2 HYPERTENSIVE KIDNEY DISEASE WITH STAGE 2 CHRONIC KIDNEY DISEASE: ICD-10-CM

## 2019-10-21 DIAGNOSIS — I12.9 HYPERTENSIVE KIDNEY DISEASE WITH STAGE 2 CHRONIC KIDNEY DISEASE: ICD-10-CM

## 2019-11-07 RX ORDER — HYDRALAZINE HYDROCHLORIDE 100 MG/1
100 TABLET, FILM COATED ORAL 3 TIMES DAILY
Qty: 90 TABLET | Refills: 2 | OUTPATIENT
Start: 2019-11-07 | End: 2020-02-05

## 2020-01-03 DIAGNOSIS — I10 HYPERTENSION, UNSPECIFIED TYPE: ICD-10-CM

## 2020-01-06 RX ORDER — CARVEDILOL 25 MG/1
TABLET ORAL
Qty: 60 TABLET | Refills: 0 | OUTPATIENT
Start: 2020-01-06

## 2020-01-22 ENCOUNTER — TELEPHONE (OUTPATIENT)
Dept: INTERNAL MEDICINE CLINIC | Facility: CLINIC | Age: 55
End: 2020-01-22

## 2020-01-22 DIAGNOSIS — I12.9 HYPERTENSIVE KIDNEY DISEASE WITH STAGE 2 CHRONIC KIDNEY DISEASE: ICD-10-CM

## 2020-01-22 DIAGNOSIS — N18.2 HYPERTENSIVE KIDNEY DISEASE WITH STAGE 2 CHRONIC KIDNEY DISEASE: ICD-10-CM

## 2020-01-22 RX ORDER — HYDRALAZINE HYDROCHLORIDE 100 MG/1
100 TABLET, FILM COATED ORAL 3 TIMES DAILY
Qty: 270 TABLET | Refills: 0 | Status: SHIPPED | OUTPATIENT
Start: 2020-01-22 | End: 2020-03-07 | Stop reason: SDUPTHER

## 2020-01-22 RX ORDER — HYDRALAZINE HYDROCHLORIDE 100 MG/1
100 TABLET, FILM COATED ORAL 3 TIMES DAILY
Qty: 270 TABLET | Refills: 0 | Status: CANCELLED | OUTPATIENT
Start: 2020-01-22 | End: 2020-04-21

## 2020-01-22 NOTE — TELEPHONE ENCOUNTER
Pt needs refills sent to [facility]    Hydralazine 100mg tabs   Qty 90 w/refills    This rx was being filled by dr Carly Graham - pt no longer sees him - will dr Olman Arango fill this      Pt only has 2 more days left to take    Any probs - call pt

## 2020-01-23 ENCOUNTER — OFFICE VISIT (OUTPATIENT)
Dept: INTERNAL MEDICINE CLINIC | Facility: CLINIC | Age: 55
End: 2020-01-23
Payer: COMMERCIAL

## 2020-01-23 ENCOUNTER — APPOINTMENT (OUTPATIENT)
Dept: LAB | Facility: CLINIC | Age: 55
End: 2020-01-23
Payer: COMMERCIAL

## 2020-01-23 VITALS
OXYGEN SATURATION: 98 % | DIASTOLIC BLOOD PRESSURE: 80 MMHG | SYSTOLIC BLOOD PRESSURE: 112 MMHG | HEART RATE: 79 BPM | HEIGHT: 62 IN | BODY MASS INDEX: 32.2 KG/M2 | WEIGHT: 175 LBS

## 2020-01-23 DIAGNOSIS — I34.0 MILD MITRAL REGURGITATION BY PRIOR ECHOCARDIOGRAPHY: ICD-10-CM

## 2020-01-23 DIAGNOSIS — Z11.59 ENCOUNTER FOR HEPATITIS C SCREENING TEST FOR LOW RISK PATIENT: ICD-10-CM

## 2020-01-23 DIAGNOSIS — N18.2 HYPERTENSIVE KIDNEY DISEASE WITH STAGE 2 CHRONIC KIDNEY DISEASE: ICD-10-CM

## 2020-01-23 DIAGNOSIS — I10 ESSENTIAL HYPERTENSION: Primary | ICD-10-CM

## 2020-01-23 DIAGNOSIS — I12.9 HYPERTENSIVE KIDNEY DISEASE WITH STAGE 2 CHRONIC KIDNEY DISEASE: ICD-10-CM

## 2020-01-23 DIAGNOSIS — Z11.4 ENCOUNTER FOR SCREENING FOR HIV: ICD-10-CM

## 2020-01-23 DIAGNOSIS — I10 ESSENTIAL HYPERTENSION: ICD-10-CM

## 2020-01-23 DIAGNOSIS — E55.9 VITAMIN D DEFICIENCY: ICD-10-CM

## 2020-01-23 DIAGNOSIS — N18.2 CHRONIC KIDNEY DISEASE, STAGE 2 (MILD): ICD-10-CM

## 2020-01-23 DIAGNOSIS — Z12.39 BREAST SCREENING: ICD-10-CM

## 2020-01-23 DIAGNOSIS — Z12.11 COLON CANCER SCREENING: ICD-10-CM

## 2020-01-23 DIAGNOSIS — Z00.00 HEALTH CARE MAINTENANCE: ICD-10-CM

## 2020-01-23 LAB
ANION GAP SERPL CALCULATED.3IONS-SCNC: 3 MMOL/L (ref 4–13)
BUN SERPL-MCNC: 8 MG/DL (ref 5–25)
CALCIUM SERPL-MCNC: 9.4 MG/DL (ref 8.3–10.1)
CHLORIDE SERPL-SCNC: 108 MMOL/L (ref 100–108)
CO2 SERPL-SCNC: 32 MMOL/L (ref 21–32)
CREAT SERPL-MCNC: 1.03 MG/DL (ref 0.6–1.3)
GFR SERPL CREATININE-BSD FRML MDRD: 70 ML/MIN/1.73SQ M
GLUCOSE P FAST SERPL-MCNC: 136 MG/DL (ref 65–99)
HCV AB SER QL: NORMAL
POTASSIUM SERPL-SCNC: 3.5 MMOL/L (ref 3.5–5.3)
SODIUM SERPL-SCNC: 143 MMOL/L (ref 136–145)

## 2020-01-23 PROCEDURE — 87389 HIV-1 AG W/HIV-1&-2 AB AG IA: CPT

## 2020-01-23 PROCEDURE — 3079F DIAST BP 80-89 MM HG: CPT | Performed by: INTERNAL MEDICINE

## 2020-01-23 PROCEDURE — 3074F SYST BP LT 130 MM HG: CPT | Performed by: INTERNAL MEDICINE

## 2020-01-23 PROCEDURE — 80048 BASIC METABOLIC PNL TOTAL CA: CPT

## 2020-01-23 PROCEDURE — 86803 HEPATITIS C AB TEST: CPT

## 2020-01-23 PROCEDURE — 99213 OFFICE O/P EST LOW 20 MIN: CPT | Performed by: INTERNAL MEDICINE

## 2020-01-23 PROCEDURE — 1036F TOBACCO NON-USER: CPT | Performed by: INTERNAL MEDICINE

## 2020-01-23 PROCEDURE — 3008F BODY MASS INDEX DOCD: CPT | Performed by: INTERNAL MEDICINE

## 2020-01-23 PROCEDURE — 36415 COLL VENOUS BLD VENIPUNCTURE: CPT

## 2020-01-23 NOTE — PROGRESS NOTES
BMI Counseling: Body mass index is 32 01 kg/m²  The BMI is above normal  Nutrition recommendations include decreasing portion sizes  Exercise recommendations include moderate physical activity 150 minutes/week  Assessment/Plan:       Diagnoses and all orders for this visit:    Essential hypertension    Mild mitral regurgitation by prior echocardiography    Chronic kidney disease, stage 2 (mild)    Hypertensive kidney disease with stage 2 chronic kidney disease    Breast screening    Colon cancer screening    Vitamin D deficiency                Subjective:      Patient ID: Gisselle Rossi is a 62 y o  female  Follow-up visit of a 75-year-old [facility] female who is hypertensive  She feels well except for headache  The headache is felt as a tight and squeezing sensation in the back of the neck radiating up bilaterally and is clearly a tension headache  She has personal issues going on involving finances and her  which I believe her probably contributing to this  Therapeutic recommendation is stretching exercises massage therapy and possibly a divorce lower  An EKG documented left atrial enlargement but follow-up echocardiogram was normal except for slight mitral regurgitation which may be followed on a serial basis   The last echocardiogram was done in May 2018 and can be done again this spring     Systolic parameters were normal as were diastolic    Renal ultrasound was done and was normal except for slight findings indicating medical renal disease  Seen by Nephrology  The last laboratory testing done 6 months ago was even better; renal function estimated to be normal     Returns for follow-up today  Colonoscopy in 2014 was normal   [place] [facility] in early 2019 was normal   Normal mammogram 5/2019    These are repeated yearly as there is a family history of breast cancer although the patient has never had an abnormal mammo      The following portions of the patient's history were reviewed and updated as appropriate:   She has no past medical history on file  ,  does not have any pertinent problems on file  ,   has a past surgical history that includes Breast cyst excision (Left)  ,  family history includes Alzheimer's disease in her father; Diabetes in her mother; Heart disease in her mother; Hypertension in her father; No Known Problems in her sister, sister, sister, and sister  ,   reports that she has never smoked  She has never used smokeless tobacco  She reports that she drinks alcohol  She reports that she does not use drugs  ,  has No Known Allergies     Current Outpatient Medications   Medication Sig Dispense Refill    carvedilol (COREG) 25 mg tablet Take 1 tablet (25 mg total) by mouth 2 (two) times a day with meals for 90 days 180 tablet 0    hydrALAZINE (APRESOLINE) 100 MG tablet Take 1 tablet (100 mg total) by mouth 3 (three) times a day 270 tablet 0    hydrochlorothiazide (HYDRODIURIL) 12 5 mg tablet Take 1 tablet (12 5 mg total) by mouth daily 90 tablet 3     No current facility-administered medications for this visit  Review of Systems   Constitutional: Negative for chills and fever  HENT: Negative for sore throat and trouble swallowing  Eyes: Negative for pain  Respiratory: Negative for cough, shortness of breath and wheezing  Cardiovascular: Negative for chest pain and leg swelling  Gastrointestinal: Negative for abdominal pain, diarrhea, nausea and vomiting  Endocrine: Negative for cold intolerance and heat intolerance  Genitourinary: Negative for dysuria, frequency and pelvic pain  Musculoskeletal: Negative for arthralgias and joint swelling  Skin: Negative for rash and wound  Allergic/Immunologic: Negative for immunocompromised state  Neurological: Positive for headaches  Negative for dizziness, seizures and syncope  Psychiatric/Behavioral: Negative for dysphoric mood  The patient is not nervous/anxious            Objective:  Vitals: 01/23/20 0910   BP: 112/80   Pulse: 79   SpO2: 98%      Physical Exam   Constitutional: She is oriented to person, place, and time  She appears well-developed and well-nourished  Cardiovascular: Normal rate and normal heart sounds  Musculoskeletal: Normal range of motion  Neurological: She is alert and oriented to person, place, and time  Skin: Skin is warm  There are no Patient Instructions on file for this visit

## 2020-01-24 LAB — HIV 1+2 AB+HIV1 P24 AG SERPL QL IA: NORMAL

## 2020-03-06 DIAGNOSIS — I10 ESSENTIAL HYPERTENSION: ICD-10-CM

## 2020-03-07 DIAGNOSIS — I12.9 HYPERTENSIVE KIDNEY DISEASE WITH STAGE 2 CHRONIC KIDNEY DISEASE: ICD-10-CM

## 2020-03-07 DIAGNOSIS — I10 HYPERTENSION, UNSPECIFIED TYPE: ICD-10-CM

## 2020-03-07 DIAGNOSIS — N18.2 HYPERTENSIVE KIDNEY DISEASE WITH STAGE 2 CHRONIC KIDNEY DISEASE: ICD-10-CM

## 2020-03-07 RX ORDER — CARVEDILOL 25 MG/1
25 TABLET ORAL 2 TIMES DAILY WITH MEALS
Qty: 180 TABLET | Refills: 0 | Status: SHIPPED | OUTPATIENT
Start: 2020-03-07 | End: 2020-06-05

## 2020-03-07 RX ORDER — HYDROCHLOROTHIAZIDE 12.5 MG/1
TABLET ORAL
Qty: 90 TABLET | Refills: 3 | Status: SHIPPED | OUTPATIENT
Start: 2020-03-07

## 2020-03-07 RX ORDER — HYDRALAZINE HYDROCHLORIDE 100 MG/1
100 TABLET, FILM COATED ORAL 3 TIMES DAILY
Qty: 270 TABLET | Refills: 0 | Status: SHIPPED | OUTPATIENT
Start: 2020-03-07 | End: 2020-06-05

## 2020-03-07 NOTE — TELEPHONE ENCOUNTER
PT REQUESTING REFILLS ON CARVEDILOL  AND HYDRALAZINE  SEND TO [facility]

## 2020-03-10 DIAGNOSIS — I10 HYPERTENSION, UNSPECIFIED TYPE: ICD-10-CM

## 2020-03-11 RX ORDER — CARVEDILOL 25 MG/1
25 TABLET ORAL 2 TIMES DAILY WITH MEALS
Qty: 180 TABLET | Refills: 0 | OUTPATIENT
Start: 2020-03-11 | End: 2020-06-09

## 2020-04-17 ENCOUNTER — TELEPHONE (OUTPATIENT)
Dept: INTERNAL MEDICINE CLINIC | Facility: CLINIC | Age: 55
End: 2020-04-17

## 2020-06-05 DIAGNOSIS — I12.9 HYPERTENSIVE KIDNEY DISEASE WITH STAGE 2 CHRONIC KIDNEY DISEASE: ICD-10-CM

## 2020-06-05 DIAGNOSIS — I10 HYPERTENSION, UNSPECIFIED TYPE: ICD-10-CM

## 2020-06-05 DIAGNOSIS — N18.2 HYPERTENSIVE KIDNEY DISEASE WITH STAGE 2 CHRONIC KIDNEY DISEASE: ICD-10-CM

## 2020-06-05 RX ORDER — CARVEDILOL 25 MG/1
TABLET ORAL
Qty: 180 TABLET | Refills: 3 | Status: SHIPPED | OUTPATIENT
Start: 2020-06-05 | End: 2021-05-05

## 2020-06-05 RX ORDER — HYDRALAZINE HYDROCHLORIDE 100 MG/1
100 TABLET, FILM COATED ORAL 3 TIMES DAILY
Qty: 90 TABLET | Refills: 3 | Status: SHIPPED | OUTPATIENT
Start: 2020-06-05 | End: 2020-10-27 | Stop reason: SDUPTHER

## 2020-07-27 ENCOUNTER — TRANSCRIBE ORDERS (OUTPATIENT)
Dept: ADMINISTRATIVE | Facility: HOSPITAL | Age: 55
End: 2020-07-27

## 2020-07-27 DIAGNOSIS — Z12.31 ENCOUNTER FOR SCREENING MAMMOGRAM FOR MALIGNANT NEOPLASM OF BREAST: Primary | ICD-10-CM

## 2020-08-27 ENCOUNTER — HOSPITAL ENCOUNTER (OUTPATIENT)
Dept: MAMMOGRAPHY | Facility: CLINIC | Age: 55
Discharge: HOME | End: 2020-08-27
Payer: COMMERCIAL

## 2020-08-27 VITALS — BODY MASS INDEX: 32.2 KG/M2 | HEIGHT: 62 IN | WEIGHT: 175 LBS

## 2020-08-27 DIAGNOSIS — Z12.31 ENCOUNTER FOR SCREENING MAMMOGRAM FOR MALIGNANT NEOPLASM OF BREAST: ICD-10-CM

## 2020-08-27 PROCEDURE — 77063 BREAST TOMOSYNTHESIS BI: CPT

## 2020-08-27 PROCEDURE — 77067 SCR MAMMO BI INCL CAD: CPT

## 2020-09-01 ENCOUNTER — OFFICE VISIT (OUTPATIENT)
Dept: OBGYN CLINIC | Age: 55
End: 2020-09-01
Payer: COMMERCIAL

## 2020-09-01 VITALS
WEIGHT: 177 LBS | BODY MASS INDEX: 32.57 KG/M2 | SYSTOLIC BLOOD PRESSURE: 124 MMHG | DIASTOLIC BLOOD PRESSURE: 82 MMHG | HEIGHT: 62 IN

## 2020-09-01 DIAGNOSIS — Z78.0 ASYMPTOMATIC POSTMENOPAUSAL STATUS: ICD-10-CM

## 2020-09-01 DIAGNOSIS — Z12.31 ENCOUNTER FOR SCREENING MAMMOGRAM FOR MALIGNANT NEOPLASM OF BREAST: ICD-10-CM

## 2020-09-01 DIAGNOSIS — Z01.419 ENCOUNTER FOR GYNECOLOGICAL EXAMINATION WITHOUT ABNORMAL FINDING: Primary | ICD-10-CM

## 2020-09-01 PROBLEM — Z12.11 COLON CANCER SCREENING: Status: RESOLVED | Noted: 2019-03-05 | Resolved: 2020-09-01

## 2020-09-01 PROBLEM — Z12.39 BREAST SCREENING: Status: RESOLVED | Noted: 2019-03-05 | Resolved: 2020-09-01

## 2020-09-01 PROCEDURE — 87624 HPV HI-RISK TYP POOLED RSLT: CPT | Performed by: NURSE PRACTITIONER

## 2020-09-01 PROCEDURE — G0145 SCR C/V CYTO,THINLAYER,RESCR: HCPCS | Performed by: NURSE PRACTITIONER

## 2020-09-01 PROCEDURE — S0610 ANNUAL GYNECOLOGICAL EXAMINA: HCPCS | Performed by: NURSE PRACTITIONER

## 2020-09-01 NOTE — PATIENT INSTRUCTIONS
Wellness Visit for Adults   WHAT YOU NEED TO KNOW:   What is a wellness visit? A wellness visit is when you see your healthcare provider to get screened for health problems  You can also get advice on how to stay healthy  Write down your questions so you remember to ask them  Ask your healthcare provider how often you should have a wellness visit  What happens at a wellness visit? Your healthcare provider will ask about your health, and your family history of health problems  This includes high blood pressure, heart disease, and cancer  He or she will ask if you have symptoms that concern you, if you smoke, and about your mood  You may also be asked about your intake of medicines, supplements, food, and alcohol  Any of the following may be done:  · Your weight  will be checked  Your height may also be checked so your body mass index (BMI) can be calculated  Your BMI shows if you are at a healthy weight  · Your blood pressure  and heart rate will be checked  Your temperature may also be checked  · Blood and urine tests  may be done  Blood tests may be done to check your cholesterol levels  Abnormal cholesterol levels increase your risk for heart disease and stroke  You may also need a blood or urine test to check for diabetes if you are at increased risk  Urine tests may be done to look for signs of an infection or kidney disease  · A physical exam  includes checking your heartbeat and lungs with a stethoscope  Your healthcare provider may also check your skin to look for sun damage  · Screening tests  may be recommended  A screening test is done to check for diseases that may not cause symptoms  The screening tests you may need depend on your age, gender, family history, and lifestyle habits  For example, colorectal screening may be recommended if you are 48years old or older  What screening tests do I need if I am a woman? · A Pap smear  is used to screen for cervical cancer   Pap smears are usually done every 3 to 5 years depending on your age  You may need them more often if you have had abnormal Pap smear test results in the past  Ask your healthcare provider how often you should have a Pap smear  · A mammogram  is an x-ray of your breasts to screen for breast cancer  Experts recommend mammograms every 2 years starting at age 48 years  You may need a mammogram at age 52 years or younger if you have an increased risk for breast cancer  Talk to your healthcare provider about when you should start having mammograms and how often you need them  What vaccines might I need? · Get an influenza vaccine  every year  The influenza vaccine protects you from the flu  Several types of viruses cause the flu  The viruses change over time, so new vaccines are made each year  · Get a tetanus-diphtheria (Td) booster vaccine  every 10 years  This vaccine protects you against tetanus and diphtheria  Tetanus is a severe infection that may cause painful muscle spasms and lockjaw  Diphtheria is a severe bacterial infection that causes a thick covering in the back of your mouth and throat  · Get a human papillomavirus (HPV) vaccine  if you are female and aged 23 to 32 or male 23 to 24 and never received it  This vaccine protects you from HPV infection  HPV is the most common infection spread by sexual contact  HPV may also cause vaginal, penile, and anal cancers  · Get a pneumococcal vaccine  if you are aged 72 years or older  The pneumococcal vaccine is an injection given to protect you from pneumococcal disease  Pneumococcal disease is an infection caused by pneumococcal bacteria  The infection may cause pneumonia, meningitis, or an ear infection  · Get a shingles vaccine  if you are aged 61 or older, even if you have had shingles before  The shingles vaccine is an injection to protect you from the varicella-zoster virus  This is the same virus that causes chickenpox   Shingles is a painful rash that develops in people who had chickenpox or have been exposed to the virus  How can I eat healthy? My Plate is a model for planning healthy meals  It shows the types and amounts of foods that should go on your plate  Fruits and vegetables make up about half of your plate, and grains and protein make up the other half  A serving of dairy is included on the side of your plate  The amount of calories and serving sizes you need depends on your age, gender, weight, and height  Examples of healthy foods are listed below:  · Eat a variety of vegetables  such as dark green, red, and orange vegetables  You can also include canned vegetables low in sodium (salt) and frozen vegetables without added butter or sauces  · Eat a variety of fresh fruits , canned fruit in 100% juice, frozen fruit, and dried fruit  · Include whole grains  At least half of the grains you eat should be whole grains  Examples include whole-wheat bread, wheat pasta, brown rice, and whole-grain cereals such as oatmeal     · Eat a variety of protein foods such as seafood (fish and shellfish), lean meat, and poultry without skin (turkey and chicken)  Examples of lean meats include pork leg, shoulder, or tenderloin, and beef round, sirloin, tenderloin, and extra lean ground beef  Other protein foods include eggs and egg substitutes, beans, peas, soy products, nuts, and seeds  · Choose low-fat dairy products such as skim or 1% milk or low-fat yogurt, cheese, and cottage cheese  · Limit unhealthy fats  such as butter, hard margarine, and shortening  How much exercise do I need? Exercise at least 30 minutes per day on most days of the week  Some examples of exercise include walking, biking, dancing, and swimming  You can also fit in more physical activity by taking the stairs instead of the elevator or parking farther away from stores  Include muscle strengthening activities 2 days each week  Regular exercise provides many health benefits  It helps you manage your weight, and decreases your risk for type 2 diabetes, heart disease, stroke, and high blood pressure  Exercise can also help improve your mood  Ask your healthcare provider about the best exercise plan for you  What are some general health and safety guidelines I should follow? · Do not smoke  Nicotine and other chemicals in cigarettes and cigars can cause lung damage  Ask your healthcare provider for information if you currently smoke and need help to quit  E-cigarettes or smokeless tobacco still contain nicotine  Talk to your healthcare provider before you use these products  · Limit alcohol  A drink of alcohol is 12 ounces of beer, 5 ounces of wine, or 1½ ounces of liquor  · Lose weight, if needed  Being overweight increases your risk of certain health conditions  These include heart disease, high blood pressure, type 2 diabetes, and certain types of cancer  · Protect your skin  Do not sunbathe or use tanning beds  Use sunscreen with a SPF 15 or higher  Apply sunscreen at least 15 minutes before you go outside  Reapply sunscreen every 2 hours  Wear protective clothing, hats, and sunglasses when you are outside  · Drive safely  Always wear your seatbelt  Make sure everyone in your car wears a seatbelt  A seatbelt can save your life if you are in an accident  Do not use your cell phone when you are driving  This could distract you and cause an accident  Pull over if you need to make a call or send a text message  · Practice safe sex  Use latex condoms if are sexually active and have more than one partner  Your healthcare provider may recommend screening tests for sexually transmitted infections (STIs)  · Wear helmets, lifejackets, and protective gear  Always wear a helmet when you ride a bike or motorcycle, go skiing, or play sports that could cause a head injury  Wear protective equipment when you play sports   Wear a lifejacket when you are on a boat or doing water sports  CARE AGREEMENT:   You have the right to help plan your care  Learn about your health condition and how it may be treated  Discuss treatment options with your caregivers to decide what care you want to receive  You always have the right to refuse treatment  The above information is an  only  It is not intended as medical advice for individual conditions or treatments  Talk to your doctor, nurse or pharmacist before following any medical regimen to see if it is safe and effective for you  © 2017 [employer] Information is for End User's use only and may not be sold, redistributed or otherwise used for commercial purposes  All illustrations and images included in CareNotes® are the copyrighted property of A [employer] A M [employer]  or [employer]

## 2020-09-01 NOTE — PROGRESS NOTES
Diagnoses and all orders for this visit:    Encounter for gynecological examination without abnormal finding    Asymptomatic postmenopausal status        Call as needed, encouraged calcium/vit D in her diet, call with any PMB, all questions answered      Pleasant 60 y o  postmenopausal female here for annual exam  She denies postmenopausal bleeding  Denies history of abnormal pap smears, last Pap 2018 nml, pap today  Denies vaginal issues  Denies pelvic pain  Denies postmenopausal issues  Sexually active  Colonoscopy due 2024  History reviewed  No pertinent past medical history    Past Surgical History:   Procedure Laterality Date    BREAST CYST EXCISION Left     14years old     Family History   Problem Relation Age of Onset    Diabetes Mother     Heart disease Mother     Alzheimer's disease Father     Hypertension Father     No Known Problems Sister     No Known Problems Sister     No Known Problems Sister     No Known Problems Sister     No Known Problems Daughter     No Known Problems Daughter     Breast cancer Neg Hx     Colon cancer Neg Hx     Ovarian cancer Neg Hx     Uterine cancer Neg Hx     Cervical cancer Neg Hx      Social History     Tobacco Use    Smoking status: Never Smoker    Smokeless tobacco: Never Used   Substance Use Topics    Alcohol use: Yes     Frequency: Monthly or less    Drug use: No       Current Outpatient Medications:     carvedilol (COREG) 25 mg tablet, TAKE 1 TABLET BY MOUTH TWICE A DAY WITH FOOD, Disp: 180 tablet, Rfl: 3    hydrALAZINE (APRESOLINE) 100 MG tablet, TAKE 1 TABLET (100 MG TOTAL) BY MOUTH 3 (THREE) TIMES A DAY, Disp: 90 tablet, Rfl: 3    hydrochlorothiazide (HYDRODIURIL) 12 5 mg tablet, TAKE 1 TABLET BY MOUTH EVERY DAY, Disp: 90 tablet, Rfl: 3  Patient Active Problem List    Diagnosis Date Noted    Encounter for gynecological examination without abnormal finding 09/01/2020    Asymptomatic postmenopausal status 09/01/2020    Hypertensive kidney disease with stage 2 chronic kidney disease 10/18/2018    Vitamin D deficiency 10/18/2018    Mild mitral regurgitation by prior echocardiography 2018    Essential hypertension 2018    Chronic kidney disease, stage 2 (mild) 2018       No Known Allergies    OB History    Para Term  AB Living   4 3 3   1 3   SAB TAB Ectopic Multiple Live Births   1       3      # Outcome Date GA Lbr Buddy/2nd Weight Sex Delivery Anes PTL Lv   4 SAB            3 Term            2 Term            1 Term              3 grandchildren and 2 great-grandchildren  [facility]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    Vitals:    20 1003   BP: 124/82   BP Location: Right arm   Patient Position: Sitting   Weight: 80 3 kg (177 lb)   Height: 5' 2" (1 575 m)     Body mass index is 32 37 kg/m²  Review of Systems   Constitutional: Negative for chills, fatigue, fever and unexpected weight change  Respiratory: Negative for shortness of breath  Gastrointestinal: Negative for anal bleeding, blood in stool, constipation and diarrhea  Genitourinary: Negative for difficulty urinating, dysuria and hematuria  Physical Exam   Constitutional: She appears well-developed and well-nourished  No distress  HENT: atraumatic, EOMI  Head: Normocephalic  Neck: Normal range of motion  Neck supple  Pulmonary: Effort normal   Breasts: bilateral without masses, skin changes or nipple discharge  Bilaterally soft and warm to touch  No areas of erythema or pain  Abdominal: Soft  Pelvic exam was performed with patient supine  No labial fusion  There is no rash, tenderness, lesion or injury on the right labia  There is no rash, tenderness, lesion or injury on the left labia  Urethral meatus does not show any tenderness, inflammation or discharge  Palpation of midline bladder without pain or discomfort  Uterus is not deviated, not enlarged, not fixed and not tender  Cervix exhibits no motion tenderness, no discharge and no friability   Right adnexum displays no mass, no tenderness and no fullness  Left adnexum displays no mass, no tenderness and no fullness  No erythema or tenderness in the vagina  No foreign body in the vagina  No signs of injury around the vagina or anus  Perineum without lesions, signs of injury, erythema or swelling  No vaginal discharge found  Lymphadenopathy:        Right: No inguinal adenopathy present  Left: No inguinal adenopathy present

## 2020-09-02 LAB
HPV HR 12 DNA CVX QL NAA+PROBE: NEGATIVE
HPV16 DNA CVX QL NAA+PROBE: NEGATIVE
HPV18 DNA CVX QL NAA+PROBE: NEGATIVE

## 2020-09-09 LAB
LAB AP GYN PRIMARY INTERPRETATION: NORMAL
Lab: NORMAL

## 2020-10-05 ENCOUNTER — APPOINTMENT (EMERGENCY)
Dept: RADIOLOGY | Facility: HOSPITAL | Age: 55
End: 2020-10-05
Payer: COMMERCIAL

## 2020-10-05 ENCOUNTER — HOSPITAL ENCOUNTER (EMERGENCY)
Facility: HOSPITAL | Age: 55
Discharge: HOME | End: 2020-10-05
Attending: EMERGENCY MEDICINE | Admitting: EMERGENCY MEDICINE
Payer: COMMERCIAL

## 2020-10-05 VITALS
RESPIRATION RATE: 16 BRPM | HEIGHT: 62 IN | HEART RATE: 79 BPM | TEMPERATURE: 98.4 F | SYSTOLIC BLOOD PRESSURE: 183 MMHG | BODY MASS INDEX: 32.57 KG/M2 | DIASTOLIC BLOOD PRESSURE: 102 MMHG | OXYGEN SATURATION: 98 % | WEIGHT: 177 LBS

## 2020-10-05 DIAGNOSIS — H66.92 ACUTE OTITIS MEDIA, LEFT: ICD-10-CM

## 2020-10-05 DIAGNOSIS — J06.9 VIRAL URI WITH COUGH: Primary | ICD-10-CM

## 2020-10-05 PROCEDURE — 99284 EMERGENCY DEPT VISIT MOD MDM: CPT

## 2020-10-05 PROCEDURE — 99284 EMERGENCY DEPT VISIT MOD MDM: CPT | Performed by: PHYSICIAN ASSISTANT

## 2020-10-05 RX ORDER — FLUTICASONE PROPIONATE 50 MCG
1 SPRAY, SUSPENSION (ML) NASAL DAILY
Qty: 16 G | Refills: 0 | Status: SHIPPED | OUTPATIENT
Start: 2020-10-05 | End: 2020-10-27

## 2020-10-05 RX ORDER — AMOXICILLIN 500 MG/1
500 CAPSULE ORAL EVERY 12 HOURS SCHEDULED
Qty: 14 CAPSULE | Refills: 0 | Status: SHIPPED | OUTPATIENT
Start: 2020-10-05 | End: 2020-10-12

## 2020-10-27 ENCOUNTER — OFFICE VISIT (OUTPATIENT)
Dept: INTERNAL MEDICINE CLINIC | Facility: CLINIC | Age: 55
End: 2020-10-27
Payer: COMMERCIAL

## 2020-10-27 VITALS
TEMPERATURE: 97.4 F | SYSTOLIC BLOOD PRESSURE: 130 MMHG | HEIGHT: 62 IN | DIASTOLIC BLOOD PRESSURE: 92 MMHG | BODY MASS INDEX: 32.39 KG/M2 | WEIGHT: 176 LBS | HEART RATE: 78 BPM | OXYGEN SATURATION: 97 %

## 2020-10-27 DIAGNOSIS — I10 ESSENTIAL HYPERTENSION: ICD-10-CM

## 2020-10-27 DIAGNOSIS — N18.2 HYPERTENSIVE KIDNEY DISEASE WITH STAGE 2 CHRONIC KIDNEY DISEASE: ICD-10-CM

## 2020-10-27 DIAGNOSIS — I12.9 HYPERTENSIVE KIDNEY DISEASE WITH STAGE 2 CHRONIC KIDNEY DISEASE: ICD-10-CM

## 2020-10-27 DIAGNOSIS — R73.9 HYPERGLYCEMIA: Primary | ICD-10-CM

## 2020-10-27 DIAGNOSIS — E78.2 MIXED HYPERLIPIDEMIA: ICD-10-CM

## 2020-10-27 LAB — SL AMB POCT HEMOGLOBIN AIC: 6.6 (ref ?–6.5)

## 2020-10-27 PROCEDURE — 99214 OFFICE O/P EST MOD 30 MIN: CPT | Performed by: INTERNAL MEDICINE

## 2020-10-27 PROCEDURE — 3725F SCREEN DEPRESSION PERFORMED: CPT | Performed by: INTERNAL MEDICINE

## 2020-10-27 PROCEDURE — 83036 HEMOGLOBIN GLYCOSYLATED A1C: CPT | Performed by: INTERNAL MEDICINE

## 2020-10-27 PROCEDURE — 3080F DIAST BP >= 90 MM HG: CPT | Performed by: INTERNAL MEDICINE

## 2020-10-27 PROCEDURE — 3008F BODY MASS INDEX DOCD: CPT | Performed by: INTERNAL MEDICINE

## 2020-10-27 PROCEDURE — 3075F SYST BP GE 130 - 139MM HG: CPT | Performed by: INTERNAL MEDICINE

## 2020-10-27 RX ORDER — VALSARTAN 160 MG/1
160 TABLET ORAL DAILY
Qty: 90 TABLET | Refills: 3 | Status: SHIPPED | OUTPATIENT
Start: 2020-10-27 | End: 2021-03-08

## 2020-10-27 RX ORDER — HYDRALAZINE HYDROCHLORIDE 100 MG/1
50 TABLET, FILM COATED ORAL 3 TIMES DAILY
Qty: 90 TABLET | Refills: 3
Start: 2020-10-27 | End: 2020-10-30

## 2020-10-27 RX ORDER — ROSUVASTATIN CALCIUM 20 MG/1
20 TABLET, COATED ORAL DAILY
Qty: 90 TABLET | Refills: 3 | Status: SHIPPED | OUTPATIENT
Start: 2020-10-27 | End: 2021-03-08 | Stop reason: SDUPTHER

## 2020-10-30 DIAGNOSIS — I12.9 HYPERTENSIVE KIDNEY DISEASE WITH STAGE 2 CHRONIC KIDNEY DISEASE: ICD-10-CM

## 2020-10-30 DIAGNOSIS — N18.2 HYPERTENSIVE KIDNEY DISEASE WITH STAGE 2 CHRONIC KIDNEY DISEASE: ICD-10-CM

## 2020-10-30 RX ORDER — HYDRALAZINE HYDROCHLORIDE 100 MG/1
TABLET, FILM COATED ORAL
Qty: 90 TABLET | Refills: 3 | Status: SHIPPED | OUTPATIENT
Start: 2020-10-30 | End: 2021-06-14

## 2020-11-02 ENCOUNTER — TELEPHONE (OUTPATIENT)
Dept: ADMINISTRATIVE | Facility: OTHER | Age: 55
End: 2020-11-02

## 2021-02-22 ENCOUNTER — APPOINTMENT (OUTPATIENT)
Dept: LAB | Facility: CLINIC | Age: 56
End: 2021-02-22
Payer: COMMERCIAL

## 2021-02-22 ENCOUNTER — OFFICE VISIT (OUTPATIENT)
Dept: INTERNAL MEDICINE CLINIC | Facility: CLINIC | Age: 56
End: 2021-02-22
Payer: COMMERCIAL

## 2021-02-22 VITALS
WEIGHT: 169.8 LBS | BODY MASS INDEX: 31.25 KG/M2 | TEMPERATURE: 98 F | OXYGEN SATURATION: 98 % | SYSTOLIC BLOOD PRESSURE: 128 MMHG | HEART RATE: 70 BPM | DIASTOLIC BLOOD PRESSURE: 80 MMHG | HEIGHT: 62 IN

## 2021-02-22 DIAGNOSIS — N18.2 TYPE 2 DIABETES MELLITUS WITH STAGE 2 CHRONIC KIDNEY DISEASE, WITHOUT LONG-TERM CURRENT USE OF INSULIN (HCC): ICD-10-CM

## 2021-02-22 DIAGNOSIS — I12.9 HYPERTENSIVE KIDNEY DISEASE WITH STAGE 2 CHRONIC KIDNEY DISEASE: ICD-10-CM

## 2021-02-22 DIAGNOSIS — E55.9 VITAMIN D DEFICIENCY: ICD-10-CM

## 2021-02-22 DIAGNOSIS — I34.0 MILD MITRAL REGURGITATION BY PRIOR ECHOCARDIOGRAPHY: ICD-10-CM

## 2021-02-22 DIAGNOSIS — I10 ESSENTIAL HYPERTENSION: Primary | ICD-10-CM

## 2021-02-22 DIAGNOSIS — E11.22 TYPE 2 DIABETES MELLITUS WITH STAGE 2 CHRONIC KIDNEY DISEASE, WITHOUT LONG-TERM CURRENT USE OF INSULIN (HCC): ICD-10-CM

## 2021-02-22 DIAGNOSIS — I10 ESSENTIAL HYPERTENSION: ICD-10-CM

## 2021-02-22 DIAGNOSIS — N18.2 HYPERTENSIVE KIDNEY DISEASE WITH STAGE 2 CHRONIC KIDNEY DISEASE: ICD-10-CM

## 2021-02-22 LAB
25(OH)D3 SERPL-MCNC: 29.4 NG/ML (ref 30–100)
ALBUMIN SERPL BCP-MCNC: 4 G/DL (ref 3.5–5)
ALP SERPL-CCNC: 74 U/L (ref 46–116)
ALT SERPL W P-5'-P-CCNC: 24 U/L (ref 12–78)
ANION GAP SERPL CALCULATED.3IONS-SCNC: 3 MMOL/L (ref 4–13)
AST SERPL W P-5'-P-CCNC: 15 U/L (ref 5–45)
BASOPHILS # BLD AUTO: 0.03 THOUSANDS/ΜL (ref 0–0.1)
BASOPHILS NFR BLD AUTO: 1 % (ref 0–1)
BILIRUB SERPL-MCNC: 0.39 MG/DL (ref 0.2–1)
BILIRUB UR QL STRIP: NEGATIVE
BUN SERPL-MCNC: 14 MG/DL (ref 5–25)
CALCIUM SERPL-MCNC: 9.6 MG/DL (ref 8.3–10.1)
CHLORIDE SERPL-SCNC: 110 MMOL/L (ref 100–108)
CHOLEST SERPL-MCNC: 206 MG/DL (ref 50–200)
CLARITY UR: CLEAR
CO2 SERPL-SCNC: 30 MMOL/L (ref 21–32)
COLOR UR: YELLOW
CREAT SERPL-MCNC: 0.94 MG/DL (ref 0.6–1.3)
CREAT UR-MCNC: 48 MG/DL
EOSINOPHIL # BLD AUTO: 0.05 THOUSAND/ΜL (ref 0–0.61)
EOSINOPHIL NFR BLD AUTO: 1 % (ref 0–6)
ERYTHROCYTE [DISTWIDTH] IN BLOOD BY AUTOMATED COUNT: 13.2 % (ref 11.6–15.1)
EST. AVERAGE GLUCOSE BLD GHB EST-MCNC: 123 MG/DL
GFR SERPL CREATININE-BSD FRML MDRD: 78 ML/MIN/1.73SQ M
GLUCOSE P FAST SERPL-MCNC: 108 MG/DL (ref 65–99)
GLUCOSE UR STRIP-MCNC: NEGATIVE MG/DL
HBA1C MFR BLD: 5.9 %
HCT VFR BLD AUTO: 42.8 % (ref 34.8–46.1)
HDLC SERPL-MCNC: 63 MG/DL
HGB BLD-MCNC: 13.5 G/DL (ref 11.5–15.4)
HGB UR QL STRIP.AUTO: NEGATIVE
IMM GRANULOCYTES # BLD AUTO: 0.01 THOUSAND/UL (ref 0–0.2)
IMM GRANULOCYTES NFR BLD AUTO: 0 % (ref 0–2)
KETONES UR STRIP-MCNC: NEGATIVE MG/DL
LDLC SERPL CALC-MCNC: 125 MG/DL (ref 0–100)
LEUKOCYTE ESTERASE UR QL STRIP: NEGATIVE
LYMPHOCYTES # BLD AUTO: 2.13 THOUSANDS/ΜL (ref 0.6–4.47)
LYMPHOCYTES NFR BLD AUTO: 54 % (ref 14–44)
MCH RBC QN AUTO: 26.9 PG (ref 26.8–34.3)
MCHC RBC AUTO-ENTMCNC: 31.5 G/DL (ref 31.4–37.4)
MCV RBC AUTO: 85 FL (ref 82–98)
MICROALBUMIN UR-MCNC: 9.8 MG/L (ref 0–20)
MICROALBUMIN/CREAT 24H UR: 20 MG/G CREATININE (ref 0–30)
MONOCYTES # BLD AUTO: 0.33 THOUSAND/ΜL (ref 0.17–1.22)
MONOCYTES NFR BLD AUTO: 8 % (ref 4–12)
NEUTROPHILS # BLD AUTO: 1.43 THOUSANDS/ΜL (ref 1.85–7.62)
NEUTS SEG NFR BLD AUTO: 36 % (ref 43–75)
NITRITE UR QL STRIP: NEGATIVE
NRBC BLD AUTO-RTO: 0 /100 WBCS
PH UR STRIP.AUTO: 6.5 [PH]
PLATELET # BLD AUTO: 328 THOUSANDS/UL (ref 149–390)
PMV BLD AUTO: 11.8 FL (ref 8.9–12.7)
POTASSIUM SERPL-SCNC: 3.6 MMOL/L (ref 3.5–5.3)
PROT SERPL-MCNC: 7.5 G/DL (ref 6.4–8.2)
PROT UR STRIP-MCNC: NEGATIVE MG/DL
RBC # BLD AUTO: 5.02 MILLION/UL (ref 3.81–5.12)
SODIUM SERPL-SCNC: 143 MMOL/L (ref 136–145)
SP GR UR STRIP.AUTO: 1.01 (ref 1–1.03)
TRIGL SERPL-MCNC: 90 MG/DL
TSH SERPL DL<=0.05 MIU/L-ACNC: 0.73 UIU/ML (ref 0.36–3.74)
UROBILINOGEN UR QL STRIP.AUTO: 0.2 E.U./DL
WBC # BLD AUTO: 3.98 THOUSAND/UL (ref 4.31–10.16)

## 2021-02-22 PROCEDURE — 80053 COMPREHEN METABOLIC PANEL: CPT

## 2021-02-22 PROCEDURE — 99214 OFFICE O/P EST MOD 30 MIN: CPT | Performed by: INTERNAL MEDICINE

## 2021-02-22 PROCEDURE — 80061 LIPID PANEL: CPT

## 2021-02-22 PROCEDURE — 81003 URINALYSIS AUTO W/O SCOPE: CPT | Performed by: INTERNAL MEDICINE

## 2021-02-22 PROCEDURE — 85025 COMPLETE CBC W/AUTO DIFF WBC: CPT

## 2021-02-22 PROCEDURE — 82570 ASSAY OF URINE CREATININE: CPT | Performed by: INTERNAL MEDICINE

## 2021-02-22 PROCEDURE — 3066F NEPHROPATHY DOC TX: CPT | Performed by: INTERNAL MEDICINE

## 2021-02-22 PROCEDURE — 82306 VITAMIN D 25 HYDROXY: CPT

## 2021-02-22 PROCEDURE — 3044F HG A1C LEVEL LT 7.0%: CPT | Performed by: INTERNAL MEDICINE

## 2021-02-22 PROCEDURE — 3061F NEG MICROALBUMINURIA REV: CPT | Performed by: INTERNAL MEDICINE

## 2021-02-22 PROCEDURE — 84443 ASSAY THYROID STIM HORMONE: CPT

## 2021-02-22 PROCEDURE — 83036 HEMOGLOBIN GLYCOSYLATED A1C: CPT

## 2021-02-22 PROCEDURE — 3008F BODY MASS INDEX DOCD: CPT | Performed by: INTERNAL MEDICINE

## 2021-02-22 PROCEDURE — 82043 UR ALBUMIN QUANTITATIVE: CPT | Performed by: INTERNAL MEDICINE

## 2021-02-22 PROCEDURE — 36415 COLL VENOUS BLD VENIPUNCTURE: CPT

## 2021-02-22 PROCEDURE — 3725F SCREEN DEPRESSION PERFORMED: CPT | Performed by: INTERNAL MEDICINE

## 2021-02-22 NOTE — PROGRESS NOTES
Assessment/Plan:       Diagnoses and all orders for this visit:    Essential hypertension  -     Lipid Panel with Direct LDL reflex; Future  -     CBC and differential; Future  -     Hemoglobin A1C; Future  -     Comprehensive metabolic panel; Future  -     TSH, 3rd generation with Free T4 reflex; Future  -     UA (URINE) with reflex to Scope    Hypertensive kidney disease with stage 2 chronic kidney disease  -     Lipid Panel with Direct LDL reflex; Future  -     CBC and differential; Future  -     Hemoglobin A1C; Future  -     Comprehensive metabolic panel; Future  -     TSH, 3rd generation with Free T4 reflex; Future  -     UA (URINE) with reflex to Scope    Vitamin D deficiency  -     Vitamin D 25 hydroxy; Future    Mild mitral regurgitation by prior echocardiography  -     Echo complete with contrast if indicated; Future    Type 2 diabetes mellitus with stage 2 chronic kidney disease, without long-term current use of insulin (HCC)  -     Lipid Panel with Direct LDL reflex; Future  -     Hemoglobin A1C; Future  -     Comprehensive metabolic panel; Future  -     UA (URINE) with reflex to Scope  -     Microalbumin / creatinine urine ratio                Subjective:      Patient ID: Jamia Hopkins is a 60 y o  female  69-year-old [facility] American female   Essential hypertension which is treated with medication  No side effects of meds  Associated CKD stage 2  Renal ultrasound documented slight findings indicating medical renal disease  Followed by Nephrology  Diabetes type 2:  6 months ago had elevated glucose  Hemoglobin A1c was 6 6%  This will need a recheck  An EKG documented left atrial enlargement but follow-up echocardiogram was normal except for slight mitral regurgitation which may be followed on a serial basis     The last echocardiogram was done in May 2018 and  this is now overdue  Systolic parameters were normal as were diastolic       Colonoscopy in 2014 was normal [place] guard in early 2019 was normal    family history breast cancer; yearly mammograms have been normal      The following portions of the patient's history were reviewed and updated as appropriate:   She has no past medical history on file  ,  does not have any pertinent problems on file  ,   has a past surgical history that includes Breast cyst excision (Left)  ,  family history includes Alzheimer's disease in her father; Diabetes in her mother; Heart disease in her mother; Hypertension in her father; No Known Problems in her daughter, daughter, sister, sister, sister, and sister  ,   reports that she has never smoked  She has never used smokeless tobacco  She reports current alcohol use  She reports that she does not use drugs  ,  has No Known Allergies     Current Outpatient Medications   Medication Sig Dispense Refill    carvedilol (COREG) 25 mg tablet TAKE 1 TABLET BY MOUTH TWICE A DAY WITH FOOD 180 tablet 3    hydrALAZINE (APRESOLINE) 100 MG tablet TAKE 1 TABLET (100 MG TOTAL) BY MOUTH 3 (THREE) TIMES A DAY 90 tablet 3    hydrochlorothiazide (HYDRODIURIL) 12 5 mg tablet TAKE 1 TABLET BY MOUTH EVERY DAY (Patient taking differently: Take 12 5 mg by mouth as needed ) 90 tablet 3    rosuvastatin (CRESTOR) 20 MG tablet Take 1 tablet (20 mg total) by mouth daily (Patient not taking: Reported on 2/22/2021) 90 tablet 3    valsartan (DIOVAN) 160 mg tablet Take 1 tablet (160 mg total) by mouth daily (Patient not taking: Reported on 2/22/2021) 90 tablet 3     No current facility-administered medications for this visit          Review of Systems      Objective:  Vitals:    02/22/21 1027   BP: 128/80   Pulse: 70   Temp: 98 °F (36 7 °C)   SpO2: 98%      Physical Exam      Patient Instructions    A patient with the above issues   Blood pressure is could take 10 carvedilol hydralazine but may need valsartan   Lipids need to be checked   Hemoglobin A1c and other laboratory parameters regarding hypertension and diabetes need to be recheck  Vitamin-D needs to be recheck     Colon screening is up-to-date     Mammogram is up-to-date     Blood testing, 2 week virtual follow-up, six-month real world follow-up

## 2021-02-22 NOTE — PATIENT INSTRUCTIONS
A patient with the above issues   Blood pressure is could take 10 carvedilol hydralazine but may need valsartan   Lipids need to be checked   Hemoglobin A1c and other laboratory parameters regarding hypertension and diabetes need to be recheck  Vitamin-D needs to be recheck     Colon screening is up-to-date     Mammogram is up-to-date     Blood testing, 2 week virtual follow-up, six-month real world follow-up

## 2021-02-22 NOTE — PROGRESS NOTES
BMI Counseling: Body mass index is 31 06 kg/m²  The BMI is above normal  Nutrition recommendations include decreasing portion sizes and moderation in carbohydrate intake  Exercise recommendations include moderate physical activity 150 minutes/week

## 2021-03-08 ENCOUNTER — TELEMEDICINE (OUTPATIENT)
Dept: INTERNAL MEDICINE CLINIC | Facility: CLINIC | Age: 56
End: 2021-03-08
Payer: COMMERCIAL

## 2021-03-08 VITALS — SYSTOLIC BLOOD PRESSURE: 137 MMHG | DIASTOLIC BLOOD PRESSURE: 86 MMHG

## 2021-03-08 DIAGNOSIS — I10 ESSENTIAL HYPERTENSION: ICD-10-CM

## 2021-03-08 DIAGNOSIS — N18.2 TYPE 2 DIABETES MELLITUS WITH STAGE 2 CHRONIC KIDNEY DISEASE, WITHOUT LONG-TERM CURRENT USE OF INSULIN (HCC): Primary | ICD-10-CM

## 2021-03-08 DIAGNOSIS — E78.2 MIXED HYPERLIPIDEMIA: ICD-10-CM

## 2021-03-08 DIAGNOSIS — E11.22 TYPE 2 DIABETES MELLITUS WITH STAGE 2 CHRONIC KIDNEY DISEASE, WITHOUT LONG-TERM CURRENT USE OF INSULIN (HCC): Primary | ICD-10-CM

## 2021-03-08 PROCEDURE — 99213 OFFICE O/P EST LOW 20 MIN: CPT | Performed by: INTERNAL MEDICINE

## 2021-03-08 PROCEDURE — 3075F SYST BP GE 130 - 139MM HG: CPT | Performed by: INTERNAL MEDICINE

## 2021-03-08 PROCEDURE — 4010F ACE/ARB THERAPY RXD/TAKEN: CPT | Performed by: INTERNAL MEDICINE

## 2021-03-08 PROCEDURE — 3079F DIAST BP 80-89 MM HG: CPT | Performed by: INTERNAL MEDICINE

## 2021-03-08 RX ORDER — VALSARTAN 80 MG/1
160 TABLET ORAL DAILY
Qty: 90 TABLET | Refills: 1 | Status: SHIPPED | OUTPATIENT
Start: 2021-03-08 | End: 2021-09-29

## 2021-03-08 RX ORDER — ROSUVASTATIN CALCIUM 20 MG/1
20 TABLET, COATED ORAL DAILY
Qty: 90 TABLET | Refills: 3 | Status: SHIPPED | OUTPATIENT
Start: 2021-03-08

## 2021-03-08 NOTE — PATIENT INSTRUCTIONS
Diabetes well controlled   Once again, BP needs improvement and recommendation is valsartan this time at 80 mg   Again, presence of diabetes mandates treatment with a statin and Crestor as recommended   Revisit with me in 6 months

## 2021-03-08 NOTE — PROGRESS NOTES
Virtual Regular Visit      Assessment/Plan:    Problem List Items Addressed This Visit        Endocrine    Type 2 diabetes mellitus with stage 2 chronic kidney disease, without long-term current use of insulin ([address] ) - Primary       Cardiovascular and Mediastinum    Essential hypertension    Relevant Medications    valsartan (DIOVAN) 80 mg tablet      Other Visit Diagnoses     Mixed hyperlipidemia        Relevant Medications    rosuvastatin (CRESTOR) 20 MG tablet               Reason for visit is   Chief Complaint   Patient presents with    Follow-up     2 week  labs and echo ordered    Virtual Regular Visit        Encounter provider Carlos Meza MD    Provider located at [address] [place] [address]      Recent Visits  No visits were found meeting these conditions  Showing recent visits within past 7 days and meeting all other requirements     Today's Visits  Date Type Provider Dept   03/08/21 Telemedicine Carlos Meza, [address] today's visits and meeting all other requirements     Future Appointments  No visits were found meeting these conditions  Showing future appointments within next 150 days and meeting all other requirements        The patient was identified by name and date of birth  María Henriquez was informed that this is a telemedicine visit and that the visit is being conducted through [address] [place] [address] and patient was informed that this is not a secure, HIPAA-compliant platform  She agrees to proceed     My office door was closed  No one else was in the room  She acknowledged consent and understanding of privacy and security of the video platform  The patient has agreed to participate and understands they can discontinue the visit at any time  Patient is aware this is a billable service  Subjective  María Henriquez is a 60 y o  female           Follow-up visit to review some test results   Hemoglobin A1c is excellent of 5 9  However, the patient is is discontinued valsartan and Crestor  Discussed this at length including concept of risk reduction and the fact that her ASVD risk is higher than desirable and it is really a good idea that she take these drugs  She feels well       No past medical history on file  Past Surgical History:   Procedure Laterality Date    BREAST CYST EXCISION Left     14years old       Current Outpatient Medications   Medication Sig Dispense Refill    carvedilol (COREG) 25 mg tablet TAKE 1 TABLET BY MOUTH TWICE A DAY WITH FOOD 180 tablet 3    hydrALAZINE (APRESOLINE) 100 MG tablet TAKE 1 TABLET (100 MG TOTAL) BY MOUTH 3 (THREE) TIMES A DAY 90 tablet 3    hydrochlorothiazide (HYDRODIURIL) 12 5 mg tablet TAKE 1 TABLET BY MOUTH EVERY DAY (Patient taking differently: Take 12 5 mg by mouth as needed ) 90 tablet 3    rosuvastatin (CRESTOR) 20 MG tablet Take 1 tablet (20 mg total) by mouth daily 90 tablet 3    valsartan (DIOVAN) 80 mg tablet Take 2 tablets (160 mg total) by mouth daily 90 tablet 1     No current facility-administered medications for this visit  No Known Allergies    Review of Systems   Constitutional: Negative for fever  Respiratory: Negative for cough and shortness of breath  Cardiovascular: Negative for chest pain and leg swelling  Neurological: Negative for headaches  Video Exam    Vitals:    03/08/21 1717   BP: 137/86       Physical Exam  Constitutional:       Appearance: Normal appearance  Pulmonary:      Effort: Pulmonary effort is normal    Neurological:      General: No focal deficit present  Mental Status: She is alert and oriented to person, place, and time     Psychiatric:         Mood and Affect: Mood normal          Behavior: Behavior normal           I spent   5 minutes directly with the patient during this visit      VIRTUAL VISIT DISCLAIMER    Isaikenna Severance acknowledges that she has consented to an online visit or consultation  She understands that the online visit is based solely on information provided by her, and that, in the absence of a face-to-face physical evaluation by the physician, the diagnosis she receives is both limited and provisional in terms of accuracy and completeness  This is not intended to replace a full medical face-to-face evaluation by the physician  Esa García understands and accepts these terms

## 2021-03-10 DIAGNOSIS — Z23 ENCOUNTER FOR IMMUNIZATION: ICD-10-CM

## 2021-04-08 ENCOUNTER — HOSPITAL ENCOUNTER (OUTPATIENT)
Dept: NON INVASIVE DIAGNOSTICS | Facility: CLINIC | Age: 56
Discharge: HOME | End: 2021-04-08
Payer: COMMERCIAL

## 2021-04-08 DIAGNOSIS — I34.0 MILD MITRAL REGURGITATION BY PRIOR ECHOCARDIOGRAPHY: ICD-10-CM

## 2021-04-08 PROCEDURE — 93306 TTE W/DOPPLER COMPLETE: CPT

## 2021-04-08 PROCEDURE — 93306 TTE W/DOPPLER COMPLETE: CPT | Performed by: INTERNAL MEDICINE

## 2021-05-05 DIAGNOSIS — I10 HYPERTENSION, UNSPECIFIED TYPE: ICD-10-CM

## 2021-05-05 RX ORDER — CARVEDILOL 25 MG/1
TABLET ORAL
Qty: 180 TABLET | Refills: 3 | Status: SHIPPED | OUTPATIENT
Start: 2021-05-05 | End: 2021-09-29 | Stop reason: SDUPTHER

## 2021-06-13 DIAGNOSIS — N18.2 HYPERTENSIVE KIDNEY DISEASE WITH STAGE 2 CHRONIC KIDNEY DISEASE: ICD-10-CM

## 2021-06-13 DIAGNOSIS — I12.9 HYPERTENSIVE KIDNEY DISEASE WITH STAGE 2 CHRONIC KIDNEY DISEASE: ICD-10-CM

## 2021-06-14 RX ORDER — HYDRALAZINE HYDROCHLORIDE 100 MG/1
TABLET, FILM COATED ORAL
Qty: 270 TABLET | Refills: 1 | Status: SHIPPED | OUTPATIENT
Start: 2021-06-14 | End: 2021-09-29

## 2021-06-28 ENCOUNTER — OFFICE VISIT (OUTPATIENT)
Dept: OBGYN CLINIC | Facility: MEDICAL CENTER | Age: 56
End: 2021-06-28
Payer: COMMERCIAL

## 2021-06-28 VITALS
BODY MASS INDEX: 30.25 KG/M2 | DIASTOLIC BLOOD PRESSURE: 90 MMHG | WEIGHT: 164.4 LBS | SYSTOLIC BLOOD PRESSURE: 130 MMHG | HEIGHT: 62 IN

## 2021-06-28 DIAGNOSIS — N76.0 ACUTE VAGINITIS: Primary | ICD-10-CM

## 2021-06-28 DIAGNOSIS — Z12.31 ENCOUNTER FOR SCREENING MAMMOGRAM FOR MALIGNANT NEOPLASM OF BREAST: ICD-10-CM

## 2021-06-28 PROBLEM — Z01.419 ENCOUNTER FOR GYNECOLOGICAL EXAMINATION WITHOUT ABNORMAL FINDING: Status: RESOLVED | Noted: 2020-09-01 | Resolved: 2021-06-28

## 2021-06-28 PROCEDURE — 3080F DIAST BP >= 90 MM HG: CPT | Performed by: PHYSICIAN ASSISTANT

## 2021-06-28 PROCEDURE — 1036F TOBACCO NON-USER: CPT | Performed by: PHYSICIAN ASSISTANT

## 2021-06-28 PROCEDURE — 3075F SYST BP GE 130 - 139MM HG: CPT | Performed by: PHYSICIAN ASSISTANT

## 2021-06-28 PROCEDURE — 99213 OFFICE O/P EST LOW 20 MIN: CPT | Performed by: PHYSICIAN ASSISTANT

## 2021-06-28 PROCEDURE — 3008F BODY MASS INDEX DOCD: CPT | Performed by: PHYSICIAN ASSISTANT

## 2021-06-28 RX ORDER — NIFEDIPINE 60 MG/1
60 TABLET, FILM COATED, EXTENDED RELEASE ORAL 2 TIMES DAILY
COMMUNITY
End: 2021-09-29 | Stop reason: SDUPTHER

## 2021-06-28 NOTE — PROGRESS NOTES
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 does not find it mentally challenging  She would love to open her own personal care home  Past Medical History:   Diagnosis Date    High blood pressure     High cholesterol     Pre-diabetes      Family History   Problem Relation Age of Onset    Diabetes Mother     Heart disease Mother     Alzheimer's disease Father     Hypertension Father     No Known Problems Sister     No Known Problems Sister     No Known Problems Sister     No Known Problems Sister     No Known Problems Daughter     No Known Problems Daughter     Breast cancer Neg Hx     Colon cancer Neg Hx     Ovarian cancer Neg Hx     Uterine cancer Neg Hx     Cervical cancer Neg Hx      Social History     Socioeconomic History    Marital status: /Civil Union     Spouse name: None    Number of children: None    Years of education: None    Highest education level: None   Occupational History    None   Tobacco Use    Smoking status: Never Smoker    Smokeless tobacco: Never Used   Vaping Use    Vaping Use: Never used   Substance and Sexual Activity    Alcohol use:  Yes    Drug use: No    Sexual activity: Yes     Partners: Male     Birth control/protection: Post-menopausal   Other Topics Concern    None   Social History Narrative    None     Social Determinants of Health     Financial Resource Strain:     Difficulty of Paying Living Expenses:    Food Insecurity:     Worried About Running Out of Food in the Last Year:    [address] in the Last Year:    Transportation Needs:     Lack of Transportation (Medical):  Lack of Transportation (Non-Medical):    Physical Activity: Inactive    Days of Exercise per Week: 0 days    Minutes of Exercise per Session: 0 min   Stress: No Stress Concern Present    Feeling of Stress : Not at all   Social Connections:     Frequency of Communication with Friends and Family:     Frequency of Social Gatherings with Friends and Family:     Attends Islam Services:     Active Member of Clubs or Organizations:     Attends Club or Organization Meetings:     Marital Status:    Intimate Partner Violence:     Fear of Current or Ex-Partner:     Emotionally Abused:     Physically Abused:     Sexually Abused:        Review of Systems   Respiratory: Negative  Cardiovascular: Negative  Gastrointestinal: Negative for constipation and diarrhea  Genitourinary: Negative for difficulty urinating, pelvic pain, vaginal bleeding, vaginal discharge, itching or odor  O:  /90 (BP Location: Right arm, Patient Position: Sitting, Cuff Size: Standard)   Ht 5' 2" (1 575 m)   Wt 74 6 kg (164 lb 6 4 oz)   BMI 30 07 kg/m²      She appears well and is in no distress  Abdomen is soft and nontender  External genitals are normal without lesions or rashes  Vagina shows scant white discharge, no erythema     Wet mount reveals no clue cells, no yeast, no trich, pH 3 5, neg whiff     A/P: Resolved vaginitis, likely contact-related or dryness related  Observe for now  Switch to [facility] Sensitive skin soap  Can use coconut oil PRN for lubricant or soothing the skin  Call with any further issues

## 2021-09-29 ENCOUNTER — APPOINTMENT (OUTPATIENT)
Dept: LAB | Facility: CLINIC | Age: 56
End: 2021-09-29
Payer: COMMERCIAL

## 2021-09-29 ENCOUNTER — OFFICE VISIT (OUTPATIENT)
Dept: INTERNAL MEDICINE CLINIC | Facility: CLINIC | Age: 56
End: 2021-09-29
Payer: COMMERCIAL

## 2021-09-29 VITALS
TEMPERATURE: 96.1 F | HEART RATE: 73 BPM | WEIGHT: 166.2 LBS | RESPIRATION RATE: 12 BRPM | HEIGHT: 62 IN | SYSTOLIC BLOOD PRESSURE: 126 MMHG | OXYGEN SATURATION: 99 % | BODY MASS INDEX: 30.59 KG/M2 | DIASTOLIC BLOOD PRESSURE: 88 MMHG

## 2021-09-29 DIAGNOSIS — Z00.00 ANNUAL PHYSICAL EXAM: Primary | ICD-10-CM

## 2021-09-29 DIAGNOSIS — E78.5 HYPERLIPIDEMIA, UNSPECIFIED HYPERLIPIDEMIA TYPE: ICD-10-CM

## 2021-09-29 DIAGNOSIS — E11.22 TYPE 2 DIABETES MELLITUS WITH STAGE 2 CHRONIC KIDNEY DISEASE, WITHOUT LONG-TERM CURRENT USE OF INSULIN (HCC): ICD-10-CM

## 2021-09-29 DIAGNOSIS — R73.03 PRE-DIABETES: ICD-10-CM

## 2021-09-29 DIAGNOSIS — I10 ESSENTIAL HYPERTENSION: ICD-10-CM

## 2021-09-29 DIAGNOSIS — N18.2 TYPE 2 DIABETES MELLITUS WITH STAGE 2 CHRONIC KIDNEY DISEASE, WITHOUT LONG-TERM CURRENT USE OF INSULIN (HCC): ICD-10-CM

## 2021-09-29 DIAGNOSIS — I10 HYPERTENSION, UNSPECIFIED TYPE: ICD-10-CM

## 2021-09-29 LAB
ALBUMIN SERPL BCP-MCNC: 4 G/DL (ref 3.5–5)
ALP SERPL-CCNC: 75 U/L (ref 46–116)
ALT SERPL W P-5'-P-CCNC: 24 U/L (ref 12–78)
ANION GAP SERPL CALCULATED.3IONS-SCNC: 3 MMOL/L (ref 4–13)
AST SERPL W P-5'-P-CCNC: 14 U/L (ref 5–45)
BILIRUB SERPL-MCNC: 0.37 MG/DL (ref 0.2–1)
BUN SERPL-MCNC: 17 MG/DL (ref 5–25)
CALCIUM SERPL-MCNC: 9.3 MG/DL (ref 8.3–10.1)
CHLORIDE SERPL-SCNC: 109 MMOL/L (ref 100–108)
CHOLEST SERPL-MCNC: 180 MG/DL (ref 50–200)
CO2 SERPL-SCNC: 29 MMOL/L (ref 21–32)
CREAT SERPL-MCNC: 1.09 MG/DL (ref 0.6–1.3)
EST. AVERAGE GLUCOSE BLD GHB EST-MCNC: 137 MG/DL
GFR SERPL CREATININE-BSD FRML MDRD: 65 ML/MIN/1.73SQ M
GLUCOSE P FAST SERPL-MCNC: 123 MG/DL (ref 65–99)
HBA1C MFR BLD: 6.4 %
HDLC SERPL-MCNC: 63 MG/DL
LDLC SERPL CALC-MCNC: 99 MG/DL (ref 0–100)
NONHDLC SERPL-MCNC: 117 MG/DL
POTASSIUM SERPL-SCNC: 4 MMOL/L (ref 3.5–5.3)
PROT SERPL-MCNC: 7.8 G/DL (ref 6.4–8.2)
SODIUM SERPL-SCNC: 141 MMOL/L (ref 136–145)
TRIGL SERPL-MCNC: 88 MG/DL

## 2021-09-29 PROCEDURE — 99396 PREV VISIT EST AGE 40-64: CPT | Performed by: NURSE PRACTITIONER

## 2021-09-29 PROCEDURE — 80053 COMPREHEN METABOLIC PANEL: CPT

## 2021-09-29 PROCEDURE — 80061 LIPID PANEL: CPT

## 2021-09-29 PROCEDURE — 83036 HEMOGLOBIN GLYCOSYLATED A1C: CPT

## 2021-09-29 PROCEDURE — 36415 COLL VENOUS BLD VENIPUNCTURE: CPT

## 2021-09-29 RX ORDER — CARVEDILOL 25 MG/1
25 TABLET ORAL 2 TIMES DAILY WITH MEALS
Qty: 180 TABLET | Refills: 3 | Status: SHIPPED | OUTPATIENT
Start: 2021-09-29

## 2021-09-29 RX ORDER — NIFEDIPINE 60 MG/1
60 TABLET, FILM COATED, EXTENDED RELEASE ORAL 2 TIMES DAILY
Qty: 180 TABLET | Refills: 3 | Status: SHIPPED | OUTPATIENT
Start: 2021-09-29

## 2021-09-29 NOTE — PROGRESS NOTES
ADULT ANNUAL PHYSICAL   [address]    NAME: Andrea Kearns  AGE: 60 y o  SEX: female  : 1965     DATE: 2021     Assessment and Plan:     Problem List Items Addressed This Visit        Endocrine    RESOLVED: Type 2 diabetes mellitus with stage 2 chronic kidney disease, without long-term current use of insulin (HCC)    Relevant Orders    HEMOGLOBIN A1C W/ EAG ESTIMATION       Cardiovascular and Mediastinum    Essential hypertension     Stable, taking coreg and nifedipine  Patient states if her legs swell occasionally she takes hctz  Relevant Medications    carvedilol (COREG) 25 mg tablet    NIFEdipine ER (ADALAT CC) 60 MG 24 hr tablet    Other Relevant Orders    Comprehensive metabolic panel       Other    Pre-diabetes     Watching her diet, will recheck A1C today  Hyperlipidemia     Will recheck lipid panel  Patient states she takes Crestor occasionally  Relevant Orders    Lipid panel      Other Visit Diagnoses     Annual physical exam    -  Primary    Hypertension, unspecified type        Relevant Medications    carvedilol (COREG) 25 mg tablet    NIFEdipine ER (ADALAT CC) 60 MG 24 hr tablet          Immunizations and preventive care screenings were discussed with patient today  Appropriate education was printed on patient's after visit summary  Counseling:  Alcohol/drug use: discussed moderation in alcohol intake, the recommendations for healthy alcohol use, and avoidance of illicit drug use  Dental Health: discussed importance of regular tooth brushing, flossing, and dental visits  Injury prevention: discussed safety/seat belts, safety helmets, smoke detectors, carbon dioxide detectors, and smoking near bedding or upholstery  Sexual health: discussed sexually transmitted diseases, partner selection, use of condoms, avoidance of unintended pregnancy, and contraceptive alternatives    · Exercise: the importance of regular exercise/physical activity was discussed  Recommend exercise 3-5 times per week for at least 30 minutes  Depression Screening and Follow-up Plan:   Patient was screened for depression during today's encounter  They screened negative with a PHQ-2 score of 0  Return in about 1 year (around 9/29/2022), or if symptoms worsen or fail to improve, for Annual physical      Chief Complaint:     Chief Complaint   Patient presents with    Physical Exam      History of Present Illness:     Adult Annual Physical   Patient here for a comprehensive physical exam  The patient reports problems - eye sight change  Upon waking up eye sight was blurry, almost glitching, then turned black and white, then purple  This lasted just a brief moment then went back to normal  She followed up with [facility] who diagnosed this as a possible ocular migraine  Has has no recurrence of symptoms since this time  Diet and Physical Activity  · Diet/Nutrition: well balanced diet  · Exercise: no formal exercise  Depression Screening  PHQ-9 Depression Screening    PHQ-9:   Frequency of the following problems over the past two weeks:      Little interest or pleasure in doing things: 0 - not at all  Feeling down, depressed, or hopeless: 0 - not at all  PHQ-2 Score: 0       General Health  · Sleep: sleeps well  · Hearing: normal - bilateral   · Vision: vision problems: eye sight was off for one day, goes for regular eye exams and most recent eye exam <1 year ago  · Dental: regular dental visits and brushes teeth twice daily  /GYN Health  · Patient is: postmenopausal  · Last menstrual period: n/a  · Contraceptive method: barrier methods  Review of Systems:     Review of Systems   Constitutional: Negative for chills, fatigue and fever  HENT: Negative for congestion, dental problem and sore throat  Eyes: Positive for visual disturbance (one day, since resolved)     Respiratory: Negative for chest tightness and shortness of breath  Cardiovascular: Positive for leg swelling (intermittently)  Negative for chest pain  Gastrointestinal: Negative for abdominal pain  Genitourinary: Negative for dysuria  Neurological: Negative for dizziness, light-headedness and headaches  Psychiatric/Behavioral: Negative for sleep disturbance  The patient is not nervous/anxious  Past Medical History:     Past Medical History:   Diagnosis Date    High blood pressure     High cholesterol     Pre-diabetes       Past Surgical History:     Past Surgical History:   Procedure Laterality Date    BREAST CYST EXCISION Left     14years old      Social History:     Social History     Socioeconomic History    Marital status: /Civil Union     Spouse name: None    Number of children: None    Years of education: None    Highest education level: None   Occupational History    None   Tobacco Use    Smoking status: Never Smoker    Smokeless tobacco: Never Used   Vaping Use    Vaping Use: Never used   Substance and Sexual Activity    Alcohol use: Yes    Drug use: No    Sexual activity: Yes     Partners: Male     Birth control/protection: Post-menopausal   Other Topics Concern    None   Social History Narrative    None     Social Determinants of Health     Financial Resource Strain:     Difficulty of Paying Living Expenses:    Food Insecurity:     Worried About Running Out of Food in the Last Year:     [address] in the Last Year:    Transportation Needs:     Lack of Transportation (Medical):      Lack of Transportation (Non-Medical):    Physical Activity: Inactive    Days of Exercise per Week: 0 days    Minutes of Exercise per Session: 0 min   Stress: No Stress Concern Present    Feeling of Stress : Not at all   Social Connections:     Frequency of Communication with Friends and Family:     Frequency of Social Gatherings with Friends and Family:     Attends Mandaen Services:  Active Member of Clubs or Organizations:     Attends Club or Organization Meetings:     Marital Status:    Intimate Partner Violence:     Fear of Current or Ex-Partner:     Emotionally Abused:     Physically Abused:     Sexually Abused:       Family History:     Family History   Problem Relation Age of Onset    Diabetes Mother     Heart disease Mother     Alzheimer's disease Father     Hypertension Father     No Known Problems Sister     No Known Problems Sister     No Known Problems Sister     No Known Problems Sister     No Known Problems Daughter     No Known Problems Daughter     Breast cancer Neg Hx     Colon cancer Neg Hx     Ovarian cancer Neg Hx     Uterine cancer Neg Hx     Cervical cancer Neg Hx       Current Medications:     Current Outpatient Medications   Medication Sig Dispense Refill    carvedilol (COREG) 25 mg tablet Take 1 tablet (25 mg total) by mouth 2 (two) times a day with meals 180 tablet 3    NIFEdipine ER (ADALAT CC) 60 MG 24 hr tablet Take 1 tablet (60 mg total) by mouth 2 (two) times a day 180 tablet 3    hydrochlorothiazide (HYDRODIURIL) 12 5 mg tablet TAKE 1 TABLET BY MOUTH EVERY DAY (Patient not taking: Reported on 9/29/2021) 90 tablet 3    rosuvastatin (CRESTOR) 20 MG tablet Take 1 tablet (20 mg total) by mouth daily (Patient not taking: Reported on 9/29/2021) 90 tablet 3     No current facility-administered medications for this visit  Allergies:     No Known Allergies   Physical Exam:     /88 (BP Location: Left arm, Patient Position: Sitting, Cuff Size: Large)   Pulse 73   Temp (!) 96 1 °F (35 6 °C) (Tympanic)   Resp 12   Ht 5' 2" (1 575 m)   Wt 75 4 kg (166 lb 3 2 oz)   SpO2 99%   BMI 30 40 kg/m²     Physical Exam  Vitals reviewed  Constitutional:       Appearance: Normal appearance  She is well-developed, well-groomed and overweight  She is not ill-appearing  HENT:      Head: Normocephalic and atraumatic        Right Ear: Hearing, tympanic membrane, ear canal and external ear normal       Left Ear: Hearing, tympanic membrane, ear canal and external ear normal       Nose: Nose normal       Mouth/Throat:      Lips: Pink  Mouth: Mucous membranes are moist       Pharynx: Oropharynx is clear  No posterior oropharyngeal erythema  Tonsils: No tonsillar exudate  Eyes:      General: Lids are normal       Extraocular Movements: Extraocular movements intact  Conjunctiva/sclera: Conjunctivae normal       Comments: Light sensitivity     Neck:      Thyroid: No thyroid mass or thyromegaly  Trachea: Trachea and phonation normal    Cardiovascular:      Rate and Rhythm: Normal rate and regular rhythm  Heart sounds: Normal heart sounds, S1 normal and S2 normal    Pulmonary:      Effort: Pulmonary effort is normal  No accessory muscle usage  Breath sounds: Normal breath sounds  No wheezing  Abdominal:      General: Abdomen is flat  Bowel sounds are normal       Palpations: Abdomen is soft  Tenderness: There is no abdominal tenderness  Musculoskeletal:      Cervical back: Full passive range of motion without pain and normal range of motion  Lymphadenopathy:      Cervical: No cervical adenopathy  Skin:     General: Skin is warm and dry  Capillary Refill: Capillary refill takes less than 2 seconds  Neurological:      General: No focal deficit present  Mental Status: She is alert  Motor: Motor function is intact  Psychiatric:         Attention and Perception: Attention and perception normal          Mood and Affect: Mood and affect normal          Speech: Speech normal          Behavior: Behavior normal  Behavior is cooperative  Thought Content:  Thought content normal          Cognition and Memory: Cognition and memory normal          Judgment: Judgment normal           Fan Corcoran, [address] [place] [address]

## 2021-09-29 NOTE — PATIENT INSTRUCTIONS

## 2021-09-30 ENCOUNTER — TELEPHONE (OUTPATIENT)
Dept: INTERNAL MEDICINE CLINIC | Facility: CLINIC | Age: 56
End: 2021-09-30

## 2021-09-30 NOTE — TELEPHONE ENCOUNTER
----- Message from Jayda Mendoza, [address] sent at 9/30/2021  3:48 PM EDT -----  Please let patient know a1c has increased back to 6 4  please try to be more strict with diet, less sweets and processed foods, try to incorporate 3-5 days of exercise each week  Cholesterol levels have improved to normal  No medications needed at this time  Thanks

## 2022-01-18 ENCOUNTER — TELEPHONE (OUTPATIENT)
Dept: INTERNAL MEDICINE CLINIC | Facility: CLINIC | Age: 57
End: 2022-01-18

## 2022-01-18 ENCOUNTER — OFFICE VISIT (OUTPATIENT)
Dept: INTERNAL MEDICINE CLINIC | Facility: CLINIC | Age: 57
End: 2022-01-18
Payer: COMMERCIAL

## 2022-01-18 VITALS
BODY MASS INDEX: 30.59 KG/M2 | HEART RATE: 75 BPM | WEIGHT: 166.2 LBS | DIASTOLIC BLOOD PRESSURE: 70 MMHG | SYSTOLIC BLOOD PRESSURE: 118 MMHG | OXYGEN SATURATION: 99 % | HEIGHT: 62 IN | TEMPERATURE: 97.3 F

## 2022-01-18 DIAGNOSIS — E78.5 HYPERLIPIDEMIA, UNSPECIFIED HYPERLIPIDEMIA TYPE: ICD-10-CM

## 2022-01-18 DIAGNOSIS — E55.9 VITAMIN D DEFICIENCY: ICD-10-CM

## 2022-01-18 DIAGNOSIS — J40 BRONCHITIS: Primary | ICD-10-CM

## 2022-01-18 DIAGNOSIS — Z12.39 ENCOUNTER FOR OTHER SCREENING FOR MALIGNANT NEOPLASM OF BREAST: ICD-10-CM

## 2022-01-18 DIAGNOSIS — I10 ESSENTIAL HYPERTENSION: ICD-10-CM

## 2022-01-18 DIAGNOSIS — R73.03 PRE-DIABETES: ICD-10-CM

## 2022-01-18 PROCEDURE — 3078F DIAST BP <80 MM HG: CPT

## 2022-01-18 PROCEDURE — 3008F BODY MASS INDEX DOCD: CPT

## 2022-01-18 PROCEDURE — 1036F TOBACCO NON-USER: CPT

## 2022-01-18 PROCEDURE — 99213 OFFICE O/P EST LOW 20 MIN: CPT

## 2022-01-18 PROCEDURE — 3074F SYST BP LT 130 MM HG: CPT

## 2022-01-18 RX ORDER — BENZONATATE 200 MG/1
200 CAPSULE ORAL 3 TIMES DAILY PRN
Qty: 20 CAPSULE | Refills: 0 | Status: SHIPPED | OUTPATIENT
Start: 2022-01-18 | End: 2022-03-30 | Stop reason: ALTCHOICE

## 2022-01-18 RX ORDER — PREDNISONE 20 MG/1
40 TABLET ORAL DAILY
Qty: 10 TABLET | Refills: 0 | Status: SHIPPED | OUTPATIENT
Start: 2022-01-18 | End: 2022-01-23

## 2022-01-18 RX ORDER — ALBUTEROL SULFATE 90 UG/1
2 AEROSOL, METERED RESPIRATORY (INHALATION) EVERY 6 HOURS PRN
Qty: 18 G | Refills: 0 | Status: SHIPPED | OUTPATIENT
Start: 2022-01-18 | End: 2022-03-18 | Stop reason: ALTCHOICE

## 2022-01-18 NOTE — TELEPHONE ENCOUNTER
Pt seeing Myra Cabrera today at 3:30PM- to come in the office  She tested negative for COVID on Sun; 1/16  Cough is still lingering    Can she still come in the office?

## 2022-01-18 NOTE — PATIENT INSTRUCTIONS
Flonase nasal spray 1 spray each nostril at bedtime  Zyrtec, Allegra, or claritin for post nasal drip  Lemon tea, ginger, honey  Prednisone 40mg daily for 5 days  Albuterol inhaler 2 puffs every 6 hours as needed for wheeze  Tessalon perles 1 every 8 hours as needed for cough  Drink plenty of water  Return if fever or symptoms worsen    Acute Bronchitis   WHAT YOU NEED TO KNOW:   Acute bronchitis is swelling and irritation in your lungs  It is usually caused by a virus and most often happens in the winter  Bronchitis may also be caused by bacteria or by a chemical irritant, such as smoke  DISCHARGE INSTRUCTIONS:   Return to the emergency department if:   · You cough up blood  · Your lips or fingernails turn blue  · You feel like you are not getting enough air when you breathe  Call your doctor if:   · Your symptoms do not go away or get worse, even after treatment  · Your cough does not get better within 4 weeks  · You have questions or concerns about your condition or care  Medicines: You may  need any of the following:  · Cough suppressants  decrease your urge to cough  · Decongestants  help loosen mucus in your lungs and make it easier to cough up  This can help you breathe easier  · Inhalers  may be given  Your healthcare provider may give you one or more inhalers to help you breathe easier and cough less  An inhaler gives your medicine to open your airways  Ask your healthcare provider to show you how to use your inhaler correctly  · Antibiotics  may be given for up to 5 days if your bronchitis is caused by bacteria  · Acetaminophen  decreases pain and fever  It is available without a doctor's order  Ask how much to take and how often to take it  Follow directions  Read the labels of all other medicines you are using to see if they also contain acetaminophen, or ask your doctor or pharmacist  Acetaminophen can cause liver damage if not taken correctly   Do not use more than 4 grams (4,000 milligrams) total of acetaminophen in one day  · NSAIDs  help decrease swelling and pain or fever  This medicine is available with or without a doctor's order  NSAIDs can cause stomach bleeding or kidney problems in certain people  If you take blood thinner medicine, always ask your healthcare provider if NSAIDs are safe for you  Always read the medicine label and follow directions  · Take your medicine as directed  Contact your healthcare provider if you think your medicine is not helping or if you have side effects  Tell him of her if you are allergic to any medicine  Keep a list of the medicines, vitamins, and herbs you take  Include the amounts, and when and why you take them  Bring the list or the pill bottles to follow-up visits  Carry your medicine list with you in case of an emergency  Self-care:   · Drink liquids as directed  You may need to drink more liquids than usual to stay hydrated  Ask how much liquid to drink each day and which liquids are best for you  · Use a cool mist humidifier  to increase air moisture in your home  This may make it easier for you to breathe and help decrease your cough  · Get more rest   Rest helps your body to heal  Slowly start to do more each day  Rest when you feel it is needed  · Avoid irritants in the air  Avoid chemicals, fumes, and dust  Wear a face mask if you must work around dust or fumes  Stay inside on days when air pollution levels are high  If you have allergies, stay inside when pollen counts are high  Do not use aerosol products, such as spray-on deodorant, bug spray, and hair spray  · Do not smoke or be around others who are smoking  Nicotine and other chemicals in cigarettes and cigars can cause lung damage  Ask your healthcare provider for information if you currently smoke and need help to quit  E-cigarettes or smokeless tobacco still contain nicotine   Talk to your healthcare provider before you use these products  Prevent acute bronchitis:       · Ask about vaccines you may need  Get a flu vaccine each year as soon as recommended, usually in September or October  Ask your healthcare provider if you should also get a pneumonia or COVID-19 vaccine  Your healthcare provider can tell you if you should also get other vaccines, and when to get them  · Prevent the spread of germs  You can decrease your risk for acute bronchitis and other illnesses by doing the following:     ? Wash your hands often with soap and water  Carry germ-killing hand lotion or gel with you  You can use the lotion or gel to clean your hands when soap and water are not available  ? Do not touch your eyes, nose, or mouth unless you have washed your hands first     ? Always cover your mouth when you cough to prevent the spread of germs  It is best to cough into a tissue or your shirt sleeve instead of into your hand  Ask those around you to cover their mouths when they cough  ? Try to avoid people who have a cold or the flu  If you are sick, stay away from others as much as possible  Follow up with your doctor as directed:  Write down questions you have so you will remember to ask them during your follow-up visits  © Copyright [employer] 2021 Information is for End User's use only and may not be sold, redistributed or otherwise used for commercial purposes  All illustrations and images included in CareNotes® are the copyrighted property of [employer]  or Jessica Armando  The above information is an  only  It is not intended as medical advice for individual conditions or treatments  Talk to your doctor, nurse or pharmacist before following any medical regimen to see if it is safe and effective for you

## 2022-01-18 NOTE — PROGRESS NOTES
[facility] - [facility] OF [address] [place] [address]    NAME: Edyta Barlow  AGE: 60 y o  SEX: female  : 1965     DATE: 2022     Assessment and Plan:     Problem List Items Addressed This Visit        Cardiovascular and Mediastinum    Essential hypertension    Relevant Orders    CBC and differential    Comprehensive metabolic panel       Other    Vitamin D deficiency    Relevant Orders    Vitamin D 25 hydroxy    Pre-diabetes    Relevant Orders    CBC and differential    Comprehensive metabolic panel    Lipid panel    HEMOGLOBIN A1C W/ EAG ESTIMATION    Hyperlipidemia    Relevant Orders    Lipid panel      Other Visit Diagnoses     Bronchitis    -  Primary    Relevant Medications    albuterol (Ventolin HFA) 90 mcg/act inhaler    predniSONE 20 mg tablet    benzonatate (TESSALON) 200 MG capsule    Encounter for other screening for malignant neoplasm of breast        Relevant Orders    Mammo screening bilateral w 3d & cad          BMI Counseling: Body mass index is 30 4 kg/m²  The BMI is above normal  Nutrition recommendations include decreasing portion sizes, encouraging healthy choices of fruits and vegetables, decreasing fast food intake, consuming healthier snacks, limiting drinks that contain sugar, moderation in carbohydrate intake, increasing intake of lean protein, reducing intake of saturated and trans fat and reducing intake of cholesterol  Exercise recommendations include moderate physical activity 150 minutes/week, exercising 3-5 times per week and strength training exercises  Rationale for BMI follow-up plan is due to patient being overweight or obese  Return in about 4 months (around 2022) for follow up   Chief Complaint:     Chief Complaint   Patient presents with    Cough     lingering, - covid        History of Present Illness:     Aurther Ramonita presents to the office today for evaluation of persistent cough and wheezing    She states on  she had symptoms of throat discomfort and started with a cough  She denies fever chills at that time however did have some nausea, fatigue, congestion, postnasal drip and had lost her taste and smell for approximately 7 days  She did PCR test which was negative  She states most of her symptoms have resolved however she still have a nonproductive cough and wheezing  She denies shortness of breath or chest pain has had no recent fever chills  She is vaccinated for COVID  I do believe she may have had a false negative COVID swab or possibly influenza  I discussed with her that I believe this is a post viral bronchitis  She is concerned about returning to work  I counseled her that this would not be contagious however she should continue masking  I will prescribe prednisone and albuterol for the coughing and wheezing  As well as Nancey Urbano which she continues  I also recommended she use Flonase and an over-the-counter decongestant for her postnasal drip  She also use nonpharmacological things like lemon tea with kate and honey for her cough  I also encouraged her to maintain adequate hydration  If symptoms do not improve or she develops fever she was instructed to notify the office  Review of Systems:     Review of Systems   Constitutional: Negative for chills, fatigue and fever  HENT: Positive for congestion and postnasal drip  Negative for sore throat  Eyes: Negative for pain and visual disturbance  Respiratory: Positive for cough and wheezing  Negative for shortness of breath  Cardiovascular: Negative for chest pain and palpitations  Gastrointestinal: Negative for abdominal pain, constipation, diarrhea, nausea and vomiting  Genitourinary: Negative for dysuria and hematuria  Musculoskeletal: Negative for arthralgias  Skin: Negative for color change and rash  Neurological: Negative for seizures and syncope  All other systems reviewed and are negative         Problem List: Patient Active Problem List   Diagnosis    Essential hypertension    Mild mitral regurgitation by prior echocardiography    Hypertensive kidney disease with stage 2 chronic kidney disease    Vitamin D deficiency    Asymptomatic postmenopausal status    Pre-diabetes    Hyperlipidemia        Objective:     /70   Pulse 75   Temp (!) 97 3 °F (36 3 °C)   Ht 5' 2" (1 575 m)   Wt 75 4 kg (166 lb 3 2 oz)   SpO2 99%   BMI 30 40 kg/m²     Physical Exam  Vitals and nursing note reviewed  Constitutional:       General: She is not in acute distress  Appearance: She is well-developed  She is obese  HENT:      Head: Normocephalic and atraumatic  Right Ear: Tympanic membrane normal       Left Ear: Tympanic membrane normal       Nose: Nose normal  No congestion or rhinorrhea  Mouth/Throat:      Mouth: Mucous membranes are moist       Pharynx: Oropharynx is clear  Eyes:      Conjunctiva/sclera: Conjunctivae normal    Neck:      Thyroid: No thyromegaly  Cardiovascular:      Rate and Rhythm: Normal rate and regular rhythm  Pulses: Normal pulses  Heart sounds: Normal heart sounds  No murmur heard  Pulmonary:      Effort: Pulmonary effort is normal  No respiratory distress  Breath sounds: Normal breath sounds  Abdominal:      General: Bowel sounds are normal       Palpations: Abdomen is soft  Tenderness: There is no abdominal tenderness  Musculoskeletal:         General: Normal range of motion  Cervical back: Normal range of motion and neck supple  Lymphadenopathy:      Cervical: No cervical adenopathy  Skin:     General: Skin is warm and dry  Capillary Refill: Capillary refill takes less than 2 seconds  Neurological:      General: No focal deficit present  Mental Status: She is alert and oriented to person, place, and time  Psychiatric:         Mood and Affect: Mood normal          Behavior: Behavior normal          Thought Content:  Thought content normal          Judgment: Judgment normal          I spent 15 minutes with this patient      [address] [place] [address]  [facility]

## 2022-03-18 ENCOUNTER — OFFICE VISIT (OUTPATIENT)
Dept: INTERNAL MEDICINE CLINIC | Facility: CLINIC | Age: 57
End: 2022-03-18
Payer: COMMERCIAL

## 2022-03-18 ENCOUNTER — APPOINTMENT (OUTPATIENT)
Dept: LAB | Facility: CLINIC | Age: 57
End: 2022-03-18
Payer: COMMERCIAL

## 2022-03-18 VITALS
SYSTOLIC BLOOD PRESSURE: 118 MMHG | WEIGHT: 165.6 LBS | DIASTOLIC BLOOD PRESSURE: 76 MMHG | OXYGEN SATURATION: 98 % | RESPIRATION RATE: 18 BRPM | TEMPERATURE: 97.9 F | HEIGHT: 62 IN | BODY MASS INDEX: 30.47 KG/M2 | HEART RATE: 74 BPM

## 2022-03-18 DIAGNOSIS — I10 ESSENTIAL HYPERTENSION: ICD-10-CM

## 2022-03-18 DIAGNOSIS — R73.03 PRE-DIABETES: ICD-10-CM

## 2022-03-18 DIAGNOSIS — E78.5 HYPERLIPIDEMIA, UNSPECIFIED HYPERLIPIDEMIA TYPE: ICD-10-CM

## 2022-03-18 DIAGNOSIS — K44.9 HIATAL HERNIA: Primary | ICD-10-CM

## 2022-03-18 DIAGNOSIS — E55.9 VITAMIN D DEFICIENCY: ICD-10-CM

## 2022-03-18 DIAGNOSIS — R10.9 FLANK PAIN: ICD-10-CM

## 2022-03-18 LAB
25(OH)D3 SERPL-MCNC: 33.8 NG/ML (ref 30–100)
ALBUMIN SERPL BCP-MCNC: 4.1 G/DL (ref 3.5–5)
ALP SERPL-CCNC: 78 U/L (ref 46–116)
ALT SERPL W P-5'-P-CCNC: 21 U/L (ref 12–78)
ANION GAP SERPL CALCULATED.3IONS-SCNC: 3 MMOL/L (ref 4–13)
AST SERPL W P-5'-P-CCNC: 13 U/L (ref 5–45)
BASOPHILS # BLD AUTO: 0.02 THOUSANDS/ΜL (ref 0–0.1)
BASOPHILS NFR BLD AUTO: 1 % (ref 0–1)
BILIRUB SERPL-MCNC: 0.43 MG/DL (ref 0.2–1)
BUN SERPL-MCNC: 11 MG/DL (ref 5–25)
CALCIUM SERPL-MCNC: 9.5 MG/DL (ref 8.3–10.1)
CHLORIDE SERPL-SCNC: 106 MMOL/L (ref 100–108)
CHOLEST SERPL-MCNC: 225 MG/DL
CO2 SERPL-SCNC: 32 MMOL/L (ref 21–32)
CREAT SERPL-MCNC: 1.26 MG/DL (ref 0.6–1.3)
EOSINOPHIL # BLD AUTO: 0.03 THOUSAND/ΜL (ref 0–0.61)
EOSINOPHIL NFR BLD AUTO: 1 % (ref 0–6)
ERYTHROCYTE [DISTWIDTH] IN BLOOD BY AUTOMATED COUNT: 13.2 % (ref 11.6–15.1)
EST. AVERAGE GLUCOSE BLD GHB EST-MCNC: 143 MG/DL
GFR SERPL CREATININE-BSD FRML MDRD: 47 ML/MIN/1.73SQ M
GLUCOSE P FAST SERPL-MCNC: 112 MG/DL (ref 65–99)
HBA1C MFR BLD: 6.6 %
HCT VFR BLD AUTO: 44.2 % (ref 34.8–46.1)
HDLC SERPL-MCNC: 68 MG/DL
HGB BLD-MCNC: 13.9 G/DL (ref 11.5–15.4)
IMM GRANULOCYTES # BLD AUTO: 0.01 THOUSAND/UL (ref 0–0.2)
IMM GRANULOCYTES NFR BLD AUTO: 0 % (ref 0–2)
LDLC SERPL CALC-MCNC: 132 MG/DL (ref 0–100)
LYMPHOCYTES # BLD AUTO: 2.13 THOUSANDS/ΜL (ref 0.6–4.47)
LYMPHOCYTES NFR BLD AUTO: 59 % (ref 14–44)
MCH RBC QN AUTO: 26.8 PG (ref 26.8–34.3)
MCHC RBC AUTO-ENTMCNC: 31.4 G/DL (ref 31.4–37.4)
MCV RBC AUTO: 85 FL (ref 82–98)
MONOCYTES # BLD AUTO: 0.32 THOUSAND/ΜL (ref 0.17–1.22)
MONOCYTES NFR BLD AUTO: 9 % (ref 4–12)
NEUTROPHILS # BLD AUTO: 1.05 THOUSANDS/ΜL (ref 1.85–7.62)
NEUTS SEG NFR BLD AUTO: 30 % (ref 43–75)
NONHDLC SERPL-MCNC: 157 MG/DL
NRBC BLD AUTO-RTO: 0 /100 WBCS
PLATELET # BLD AUTO: 339 THOUSANDS/UL (ref 149–390)
PMV BLD AUTO: 11.1 FL (ref 8.9–12.7)
POTASSIUM SERPL-SCNC: 3.7 MMOL/L (ref 3.5–5.3)
PROT SERPL-MCNC: 8 G/DL (ref 6.4–8.2)
RBC # BLD AUTO: 5.19 MILLION/UL (ref 3.81–5.12)
SODIUM SERPL-SCNC: 141 MMOL/L (ref 136–145)
TRIGL SERPL-MCNC: 127 MG/DL
WBC # BLD AUTO: 3.56 THOUSAND/UL (ref 4.31–10.16)

## 2022-03-18 PROCEDURE — 80061 LIPID PANEL: CPT

## 2022-03-18 PROCEDURE — 3044F HG A1C LEVEL LT 7.0%: CPT | Performed by: PHYSICIAN ASSISTANT

## 2022-03-18 PROCEDURE — 85025 COMPLETE CBC W/AUTO DIFF WBC: CPT

## 2022-03-18 PROCEDURE — 80053 COMPREHEN METABOLIC PANEL: CPT

## 2022-03-18 PROCEDURE — 36415 COLL VENOUS BLD VENIPUNCTURE: CPT

## 2022-03-18 PROCEDURE — 82306 VITAMIN D 25 HYDROXY: CPT

## 2022-03-18 PROCEDURE — 99214 OFFICE O/P EST MOD 30 MIN: CPT | Performed by: NURSE PRACTITIONER

## 2022-03-18 PROCEDURE — 83036 HEMOGLOBIN GLYCOSYLATED A1C: CPT

## 2022-03-18 NOTE — PROGRESS NOTES
[facility] - [facility] OF [address] [place] [address]    NAME: Edyta Barlwo  AGE: 60 y o  SEX: female  : 1965     DATE: 3/18/2022     Assessment and Plan:     Problem List Items Addressed This Visit        Other    Hiatal hernia - Primary       The patient with symptoms of food getting stuck and chest pressure after eating  She states this gets worse when she lies down  She also complains of some fullness after eating  Patient denies any acid reflux  She denies any nausea or vomiting  I will order a upper GI series for the patient  I have also ordered a referral to Gastroenterology  I did make the patient aware I believe this may be related to a hiatal hernia  Written information regarding hiatal hernia was provided to the patient         Relevant Orders    Ambulatory Referral to Gastroenterology    FL UPPER GI UGI    Flank pain      The patient with some right posterior flank discomfort and what she says is a lump in that area  In the recent past she states it has become tender  Upon exam I do not appreciate a difference when comparing the right to the left  An ultrasound that area was ordered for the patient as she is quite anxious because of the discomfort  Relevant Orders    US abdomen limited           No follow-ups on file  Chief Complaint:     Chief Complaint   Patient presents with    Follow-up     fullness in chest area        History of Present Illness: The patient presents to the office today with a several week history of fullness after eating and midsternal pressure after eating  She states this gets worse when she lies down and eats a big meal   Information regarding a hiatal hernia was provided to the patient  Imaging was ordered  And the patient was referred to Gastroenterology  In addition the patient has right posterior flank tenderness and an ultrasound was ordered    Patient is overdue for blood work and she will have that done on her way out today  Review of Systems:     Review of Systems   Constitutional: Negative for activity change, fatigue and fever  HENT: Negative for congestion, hearing loss, rhinorrhea, trouble swallowing and voice change  Eyes: Negative for photophobia, pain, discharge and visual disturbance  Respiratory: Negative for cough, chest tightness and shortness of breath  Cardiovascular: Negative for chest pain, palpitations and leg swelling  Gastrointestinal: Negative for abdominal pain, blood in stool, constipation, nausea and vomiting  Fullness after eating, pressure after eating   Endocrine: Negative for cold intolerance and heat intolerance  Genitourinary: Positive for flank pain (right posterior)  Negative for difficulty urinating, frequency, hematuria, urgency, vaginal bleeding and vaginal discharge  Musculoskeletal: Negative for arthralgias and myalgias  Skin: Negative  Neurological: Negative for dizziness, weakness, numbness and headaches  Psychiatric/Behavioral: Negative for decreased concentration  The patient is not nervous/anxious           Problem List:     Patient Active Problem List   Diagnosis    Essential hypertension    Mild mitral regurgitation by prior echocardiography    Hypertensive kidney disease with stage 2 chronic kidney disease    Vitamin D deficiency    Asymptomatic postmenopausal status    Pre-diabetes    Hyperlipidemia    Hiatal hernia    Flank pain        Objective:     /76 (BP Location: Left arm, Patient Position: Sitting, Cuff Size: Standard)   Pulse 74   Temp 97 9 °F (36 6 °C) (Oral) Comment: no nsaids  Resp 18   Ht 5' 2" (1 575 m)   Wt 75 1 kg (165 lb 9 6 oz)   SpO2 98%   BMI 30 29 kg/m²     Current Outpatient Medications   Medication Instructions    benzonatate (TESSALON) 200 mg, Oral, 3 times daily PRN    carvedilol (COREG) 25 mg, Oral, 2 times daily with meals    hydrochlorothiazide (HYDRODIURIL) 12 5 mg tablet TAKE 1 TABLET BY MOUTH EVERY DAY    NIFEdipine ER (ADALAT CC) 60 mg, Oral, 2 times daily    rosuvastatin (CRESTOR) 20 mg, Oral, Daily     Physical Exam  Vitals reviewed  Constitutional:       Appearance: Normal appearance  She is obese  HENT:      Head: Normocephalic  Nose: Nose normal       Mouth/Throat:      Mouth: Mucous membranes are moist       Pharynx: Oropharynx is clear  Eyes:      Extraocular Movements: Extraocular movements intact  Pupils: Pupils are equal, round, and reactive to light  Cardiovascular:      Rate and Rhythm: Normal rate and regular rhythm  Pulses: Normal pulses  Heart sounds: Normal heart sounds  Pulmonary:      Effort: Pulmonary effort is normal       Breath sounds: Normal breath sounds  Abdominal:      General: There is no distension  Palpations: There is no mass  Tenderness: There is no abdominal tenderness  There is no guarding or rebound  Hernia: No hernia is present  Musculoskeletal:         General: Normal range of motion  Skin:     General: Skin is warm and dry  Neurological:      General: No focal deficit present  Mental Status: She is alert and oriented to person, place, and time  Psychiatric:         Mood and Affect: Mood normal          Behavior: Behavior normal          Thought Content:  Thought content normal          Judgment: Judgment normal          [facility], [address] [place] [address]

## 2022-03-18 NOTE — ASSESSMENT & PLAN NOTE
FAXED CARDIAC CLEARANCE TO DR NARAYANAN @ [phone] The patient with some right posterior flank discomfort and what she says is a lump in that area  In the recent past she states it has become tender  Upon exam I do not appreciate a difference when comparing the right to the left  An ultrasound that area was ordered for the patient as she is quite anxious because of the discomfort

## 2022-03-18 NOTE — ASSESSMENT & PLAN NOTE
The patient with symptoms of food getting stuck and chest pressure after eating  She states this gets worse when she lies down  She also complains of some fullness after eating  Patient denies any acid reflux  She denies any nausea or vomiting  I will order a upper GI series for the patient  I have also ordered a referral to Gastroenterology  I did make the patient aware I believe this may be related to a hiatal hernia    Written information regarding hiatal hernia was provided to the patient

## 2022-03-18 NOTE — PATIENT INSTRUCTIONS
Hiatal Hernia   AMBULATORY CARE:   A hiatal hernia  is a condition that causes part of your stomach to bulge through the hiatus (small opening) in your diaphragm  The part of the stomach may move up and down, or it may get trapped above the diaphragm  Common symptoms include the following: The most common symptom is heartburn  This usually occurs after meals and spreads to your neck, jaw, or shoulder  You may have no signs or symptoms, or you may have any of the following:  · Abdominal pain, especially in the area just above your navel    · Bitter or acid taste in your mouth    · Trouble swallowing    · Coughing or hoarseness    · Chest pain or shortness of breath that occurs after eating    · Frequent burping or hiccups    · Uncomfortable feeling of fullness after eating    Call your local emergency number (911 in the [address] [facility]) if:   · You have severe chest pain and sudden trouble breathing  Seek care immediately if:   · You have severe abdominal pain  · You try to vomit but nothing comes out (retching)  · Your bowel movements are black or bloody  · Your vomit looks like coffee grounds or has blood in it  Call your doctor if:   · Your symptoms are getting worse  · You have nausea, and you are vomiting  · You are losing weight without trying  · You have questions or concerns about your condition or care  Treatment for hiatal hernia  depends on the type of hiatal hernia you have and on your symptoms  You may not need any treatment  You may need any of the following:  · Medicines  may be given to relieve heartburn symptoms  These medicines help to decrease or block stomach acid  You may also be given medicines that help to tighten the esophageal sphincter  · Surgery  may be done when medicines cannot control your symptoms, or other problems are present  Your healthcare provider may also suggest surgery depending on the type of hernia you have   Your healthcare provider can put your stomach back into its normal location  He or she may make the hiatus (hole) smaller and anchor your stomach in your abdomen  Fundoplication is a surgery that wraps the upper part of the stomach around the esophageal sphincter to strengthen it  Manage symptoms: The following nutrition and lifestyle changes may be recommended to relieve symptoms of heartburn:     · Avoid foods that make your symptoms worse  These may include spicy foods, fruit juices, alcohol, caffeine, chocolate, and mint  · Eat several small meals during the day  Small meals give your stomach less food to digest     · Avoid lying down and bending forward after you eat  Do not eat meals 2 to 3 hours before bedtime  This decreases your risk for reflux  · Maintain a healthy weight  If you are overweight, weight loss may help relieve your symptoms  · Sleep with your head elevated  at least 6 inches  · Do not smoke  Smoking can increase your symptoms of heartburn  Follow up with your doctor as directed:  Write down your questions so you remember to ask them during your visits  © Copyright [employer] 2022 Information is for End User's use only and may not be sold, redistributed or otherwise used for commercial purposes  All illustrations and images included in CareNotes® are the copyrighted property of [employer]  or [facility] [employer]   The above information is an  only  It is not intended as medical advice for individual conditions or treatments  Talk to your doctor, nurse or pharmacist before following any medical regimen to see if it is safe and effective for you

## 2022-03-30 ENCOUNTER — HOSPITAL ENCOUNTER (OUTPATIENT)
Dept: RADIOLOGY | Facility: HOSPITAL | Age: 57
Discharge: HOME | End: 2022-03-30
Payer: COMMERCIAL

## 2022-03-30 ENCOUNTER — PREP FOR PROCEDURE (OUTPATIENT)
Dept: GASTROENTEROLOGY | Facility: CLINIC | Age: 57
End: 2022-03-30

## 2022-03-30 ENCOUNTER — OFFICE VISIT (OUTPATIENT)
Dept: GASTROENTEROLOGY | Facility: CLINIC | Age: 57
End: 2022-03-30
Payer: COMMERCIAL

## 2022-03-30 ENCOUNTER — HOSPITAL ENCOUNTER (OUTPATIENT)
Dept: ULTRASOUND IMAGING | Facility: HOSPITAL | Age: 57
Discharge: HOME | End: 2022-03-30
Payer: COMMERCIAL

## 2022-03-30 VITALS
DIASTOLIC BLOOD PRESSURE: 80 MMHG | OXYGEN SATURATION: 99 % | HEART RATE: 75 BPM | WEIGHT: 168 LBS | BODY MASS INDEX: 30.91 KG/M2 | SYSTOLIC BLOOD PRESSURE: 132 MMHG | HEIGHT: 62 IN

## 2022-03-30 DIAGNOSIS — K44.9 HIATAL HERNIA: ICD-10-CM

## 2022-03-30 DIAGNOSIS — R68.81 EARLY SATIETY: ICD-10-CM

## 2022-03-30 DIAGNOSIS — K21.9 GASTROESOPHAGEAL REFLUX DISEASE, UNSPECIFIED WHETHER ESOPHAGITIS PRESENT: Primary | ICD-10-CM

## 2022-03-30 DIAGNOSIS — R10.9 FLANK PAIN: ICD-10-CM

## 2022-03-30 PROCEDURE — 3008F BODY MASS INDEX DOCD: CPT | Performed by: PHYSICIAN ASSISTANT

## 2022-03-30 PROCEDURE — 76705 ECHO EXAM OF ABDOMEN: CPT

## 2022-03-30 PROCEDURE — 1036F TOBACCO NON-USER: CPT | Performed by: PHYSICIAN ASSISTANT

## 2022-03-30 PROCEDURE — 74240 X-RAY XM UPR GI TRC 1CNTRST: CPT

## 2022-03-30 PROCEDURE — 99204 OFFICE O/P NEW MOD 45 MIN: CPT | Performed by: PHYSICIAN ASSISTANT

## 2022-03-30 NOTE — PROGRESS NOTES
[facility] - Outpatient Consultation  Justyn Weber 60 y o  female MRN: [id number]  Encounter: [id number]          ASSESSMENT AND PLAN:      1  Gastroesophageal reflux disease  2  Early satiety    Patient presents with complaints of feeling as though she is full up to her throat x 1 month  She underwent an ultrasound and UGI series today - results are pending  Will plan for EGD to investigate for ulcers, esophagitis, gastritis, strictures, etc   Consider a PPI course after the EGD  GERD modifications  ______________________________________________________________________    HPI:  Patient presents for an evaluation of early satiety and a globus sensation x 1 month  She reports feeling as though she is full up to her throat  No abdominal pain  No nausea or vomiting  No melena or rectal bleeding  No heartburn  At present, her symptoms seem better  She does report recent stress  She has never had an EGD  She had an ultrasound and UGI series today - results are pending  She reports she had a colonoscopy 3 years ago and it was normal   She denies any family history of esophageal, stomach, colon, or pancreatic cancer  REVIEW OF SYSTEMS:    CONSTITUTIONAL: Denies any fever, chills, rigors, and weight loss  HEENT: No earache or tinnitus  Denies hearing loss or visual disturbances  CARDIOVASCULAR: No chest pain or palpitations  RESPIRATORY: Denies any cough, hemoptysis, shortness of breath or dyspnea on exertion  GASTROINTESTINAL: As noted in the History of Present Illness  GENITOURINARY: No problems with urination  Denies any hematuria or dysuria  NEUROLOGIC: No dizziness or vertigo, denies headaches  MUSCULOSKELETAL: Denies any muscle or joint pain  SKIN: Denies skin rashes or itching  ENDOCRINE: Denies excessive thirst  Denies intolerance to heat or cold  PSYCHOSOCIAL: Denies depression or anxiety  Denies any recent memory loss         Historical Information   Past Medical History:   Diagnosis Date    High blood pressure     High cholesterol     Pre-diabetes      Past Surgical History:   Procedure Laterality Date    BREAST CYST EXCISION Left     14years old     Social History   Social History     Substance and Sexual Activity   Alcohol Use Yes     Social History     Substance and Sexual Activity   Drug Use No     Social History     Tobacco Use   Smoking Status Never Smoker   Smokeless Tobacco Never Used     Family History   Problem Relation Age of Onset    Diabetes Mother     Heart disease Mother     Alzheimer's disease Father     Hypertension Father     No Known Problems Sister     No Known Problems Sister     No Known Problems Sister     No Known Problems Sister     No Known Problems Daughter     No Known Problems Daughter     Breast cancer Neg Hx     Colon cancer Neg Hx     Ovarian cancer Neg Hx     Uterine cancer Neg Hx     Cervical cancer Neg Hx        Meds/Allergies       Current Outpatient Medications:     carvedilol (COREG) 25 mg tablet    hydrochlorothiazide (HYDRODIURIL) 12 5 mg tablet    NIFEdipine ER (ADALAT CC) 60 MG 24 hr tablet    rosuvastatin (CRESTOR) 20 MG tablet    benzonatate (TESSALON) 200 MG capsule  No current facility-administered medications for this visit  No Known Allergies        Objective     Blood pressure 132/80, pulse 75, height 5' 2" (1 575 m), weight 76 2 kg (168 lb), SpO2 99 %, not currently breastfeeding  Body mass index is 30 73 kg/m²  PHYSICAL EXAM:      General Appearance:   Alert, cooperative, no distress   HEENT:   Normocephalic, atraumatic, anicteric     Neck:  Supple, symmetrical, trachea midline   Lungs:   Clear to auscultation bilaterally; no rales, rhonchi or wheezing; respirations unlabored    Heart[de-identified]   Regular rate and rhythm; no murmur, rub, or gallop     Abdomen:   Soft, non-tender, non-distended; normal bowel sounds; no masses, no organomegaly    Genitalia:   Deferred  Rectal:   Deferred    Extremities:  No cyanosis, clubbing or edema    Pulses:  2+ and symmetric    Skin:  No jaundice, rashes, or lesions    Lymph nodes:  No palpable cervical lymphadenopathy        Lab Results:   No visits with results within 1 Day(s) from this visit  Latest known visit with results is:   Appointment on 03/18/2022   Component Date Value    WBC 03/18/2022 3 56*    RBC 03/18/2022 5 19*    Hemoglobin 03/18/2022 13 9     Hematocrit 03/18/2022 44 2     MCV 03/18/2022 85     MCH 03/18/2022 26 8     MCHC 03/18/2022 31 4     RDW 03/18/2022 13 2     MPV 03/18/2022 11 1     Platelets 92/76/5227 339     nRBC 03/18/2022 0     Neutrophils Relative 03/18/2022 30*    Immat GRANS % 03/18/2022 0     Lymphocytes Relative 03/18/2022 59*    Monocytes Relative 03/18/2022 9     Eosinophils Relative 03/18/2022 1     Basophils Relative 03/18/2022 1     Neutrophils Absolute 03/18/2022 1 05*    Immature Grans Absolute 03/18/2022 0 01     Lymphocytes Absolute 03/18/2022 2 13     Monocytes Absolute 03/18/2022 0 32     Eosinophils Absolute 03/18/2022 0 03     Basophils Absolute 03/18/2022 0 02     Sodium 03/18/2022 141     Potassium 03/18/2022 3 7     Chloride 03/18/2022 106     CO2 03/18/2022 32     ANION GAP 03/18/2022 3*    BUN 03/18/2022 11     Creatinine 03/18/2022 1 26     Glucose, Fasting 03/18/2022 112*    Calcium 03/18/2022 9 5     AST 03/18/2022 13     ALT 03/18/2022 21     Alkaline Phosphatase 03/18/2022 78     Total Protein 03/18/2022 8 0     Albumin 03/18/2022 4 1     Total Bilirubin 03/18/2022 0 43     eGFR 03/18/2022 47     Cholesterol 03/18/2022 225*    Triglycerides 03/18/2022 127     HDL, Direct 03/18/2022 68     LDL Calculated 03/18/2022 132*    Non-HDL-Chol (CHOL-HDL) 03/18/2022 157     Vit D, 25-Hydroxy 03/18/2022 33 8     Hemoglobin A1C 03/18/2022 6 6*    EAG 03/18/2022 143          Radiology Results:   No results found

## 2022-04-12 ENCOUNTER — TELEPHONE (OUTPATIENT)
Dept: INTERNAL MEDICINE CLINIC | Facility: CLINIC | Age: 57
End: 2022-04-12

## 2022-05-02 ENCOUNTER — HOSPITAL ENCOUNTER (OUTPATIENT)
Dept: MAMMOGRAPHY | Facility: CLINIC | Age: 57
Discharge: HOME | End: 2022-05-02
Payer: COMMERCIAL

## 2022-05-02 DIAGNOSIS — Z12.39 ENCOUNTER FOR OTHER SCREENING FOR MALIGNANT NEOPLASM OF BREAST: ICD-10-CM

## 2022-05-02 PROCEDURE — 77063 BREAST TOMOSYNTHESIS BI: CPT

## 2022-05-02 PROCEDURE — 77067 SCR MAMMO BI INCL CAD: CPT

## 2022-10-09 DIAGNOSIS — I10 HYPERTENSION, UNSPECIFIED TYPE: ICD-10-CM

## 2022-10-09 RX ORDER — CARVEDILOL 25 MG/1
TABLET ORAL
Qty: 180 TABLET | Refills: 0 | Status: SHIPPED | OUTPATIENT
Start: 2022-10-09

## 2022-11-10 DIAGNOSIS — I10 HYPERTENSION, UNSPECIFIED TYPE: ICD-10-CM

## 2022-11-11 RX ORDER — NIFEDIPINE 60 MG/1
60 TABLET, FILM COATED, EXTENDED RELEASE ORAL 2 TIMES DAILY
Qty: 180 TABLET | Refills: 3 | Status: SHIPPED | OUTPATIENT
Start: 2022-11-11

## 2022-11-11 RX ORDER — CARVEDILOL 25 MG/1
25 TABLET ORAL 2 TIMES DAILY WITH MEALS
Qty: 180 TABLET | Refills: 3 | Status: SHIPPED | OUTPATIENT
Start: 2022-11-11

## 2022-11-17 ENCOUNTER — ANNUAL EXAM (OUTPATIENT)
Dept: OBGYN CLINIC | Age: 57
End: 2022-11-17

## 2022-11-17 VITALS — BODY MASS INDEX: 31.1 KG/M2 | HEIGHT: 62 IN | WEIGHT: 169 LBS

## 2022-11-17 DIAGNOSIS — Z01.411 ENCOUNTER FOR GYNECOLOGICAL EXAMINATION WITH ABNORMAL FINDING: Primary | ICD-10-CM

## 2022-11-17 DIAGNOSIS — Z78.0 ASYMPTOMATIC POSTMENOPAUSAL STATUS: ICD-10-CM

## 2022-11-17 DIAGNOSIS — Z12.31 ENCOUNTER FOR SCREENING MAMMOGRAM FOR MALIGNANT NEOPLASM OF BREAST: ICD-10-CM

## 2022-11-17 DIAGNOSIS — L30.9 DERMATITIS: ICD-10-CM

## 2022-11-17 RX ORDER — NYSTATIN AND TRIAMCINOLONE ACETONIDE 100000; 1 [USP'U]/G; MG/G
OINTMENT TOPICAL 2 TIMES DAILY
Qty: 30 G | Refills: 1 | Status: SHIPPED | OUTPATIENT
Start: 2022-11-17 | End: 2022-12-01

## 2022-11-17 RX ORDER — FLUCONAZOLE 150 MG/1
150 TABLET ORAL ONCE
Qty: 2 TABLET | Refills: 0 | Status: SHIPPED | OUTPATIENT
Start: 2022-11-17 | End: 2022-11-17

## 2022-11-17 NOTE — PATIENT INSTRUCTIONS
Breast Self Exam for Women   AMBULATORY CARE:   A breast self-exam (BSE)  is a way to check your breasts for lumps and other changes  Regular BSEs can help you know how your breasts normally look and feel  Most breast lumps or changes are not cancer, but you should always have them checked by a healthcare provider  Why you should do a BSE:  Breast cancer is the most common type of cancer in women  Even if you have mammograms, you may still want to do a BSE regularly  If you know how your breasts normally feel and look, it may help you know when to contact your healthcare provider  Mammograms can miss some cancers  You may find a lump during a BSE that did not show up on a mammogram   When you should do a BSE:  If you have periods, you may want to do your BSE 1 week after your period ends  This is the time when your breasts may be the least swollen, lumpy, or tender  You can do regular BSEs even if you are breastfeeding or have breast implants  Call your doctor if:   You find any lumps or changes in your breasts  You have breast pain or fluid coming from your nipples  You have questions or concerns about your condition or care  How to do a BSE:       Look at your breasts in a mirror  Look at the size and shape of each breast and nipple  Check for swelling, lumps, dimpling, scaly skin, or other skin changes  Look for nipple changes, such as a nipple that is painful or beginning to pull inward  Gently squeeze both nipples and check to see if fluid (that is not breast milk) comes out of them  If you find any of these or other breast changes, contact your healthcare provider  Check your breasts while you sit or  the following 3 positions:    [place] your arms down at your sides  Raise your hands and join them behind your head  Put firm pressure with your hands on your hips  Bend slightly forward while you look at your breasts in the mirror  Lie down and feel your breasts    When you lie down, your breast tissue spreads out evenly over your chest  This makes it easier for you to feel for lumps and anything that may not be normal for your breasts  Do a BSE on one breast at a time  Place a small pillow or towel under your left shoulder  Put your left arm behind your head  Use the 3 middle fingers of your right hand  Use your fingertip pads, on the top of your fingers  Your fingertip pad is the most sensitive part of your finger  Use small circles to feel your breast tissue  Use your fingertip pads to make dime-sized, overlapping circles on your breast and armpits  Use light, medium, and firm pressure  First, press lightly  Second, press with medium pressure to feel a little deeper into the breast  Last, use firm pressure to feel deep within your breast     Examine your entire breast area  Examine the breast area from above the breast to below the breast where you feel only ribs  Make small circles with your fingertips, starting in the middle of your armpit  Make circles going up and down the breast area  Continue toward your breast and all the way across it  Examine the area from your armpit all the way over to the middle of your chest (breastbone)  Stop at the middle of your chest     Move the pillow or towel to your right shoulder, and put your right arm behind your head  Use the 3 fingertip pads of your left hand, and repeat the above steps to do a BSE on your right breast     What else you can do to check for breast problems or cancer:  Talk to your healthcare provider about mammograms  A mammogram is an x-ray of your breasts to screen for breast cancer or other problems  Your provider can tell you the benefits and risks of mammograms  The first mammogram is usually at age 39 or 48  Your provider may recommend you start at 36 or younger if your risk for breast cancer is high  Mammograms usually continue every 1 to 2 years until age 76         Follow up with your doctor as directed:  Write down your questions so you remember to ask them during your visits  © Copyright [employer] 2022 Information is for End User's use only and may not be sold, redistributed or otherwise used for commercial purposes  All illustrations and images included in CareNotes® are the copyrighted property of A D A M [employer] Inc  or Jessica Armando  The above information is an  only  It is not intended as medical advice for individual conditions or treatments  Talk to your doctor, nurse or pharmacist before following any medical regimen to see if it is safe and effective for you  Vaginitis   AMBULATORY CARE:   Vaginitis  is an inflammation or infection of your vagina  Vaginitis is commonly caused by a bacterial or fungal infection  Other causes include a foreign object or exposure to a chemical irritant  You may be given medicines to treat an infection caused by bacteria or a fungus  Medicines may be given as a pill, or as a cream, gel, or tablet you insert into your vagina  Common signs and symptoms:   Pain, itching, redness, burning, or swelling in your vagina    An odor from your vagina that may be foul or smell like fish    Thick, curd-like discharge    Thin, gray-white discharge    Small skin tears or chafing    Painful sexual intercourse    Pain when you urinate    Call your doctor if:   You have unusual vaginal bleeding  You have severe abdominal pain  You have a fever  Your symptoms get worse, even after treatment  Your symptoms return  You have questions or concerns about your condition or care  Manage your symptoms:   Take a sitz bath  Fill a bathtub with 4 to 6 inches of warm water  You may also use a sitz bath pan that fits inside a toilet bowl  Sit in the sitz bath for 15 minutes  Do this 3 times a day, and after each bowel movement  The warm water can help decrease pain and swelling  Use over-the-counter creams or ointments as directed    Examples include petroleum jelly, zinc creams, or hydrocortisone creams  These will help decrease itching and inflammation  Do not wear tight-fitting clothes or undergarments  These can make your symptoms worse  Do not use douches other irritating products in your vagina  Examples include bubble baths and perfumed soaps  The vagina is delicate and easily irritated  Ask your healthcare provider if it is okay to use tampons during your monthly periods  You may need to use pads instead until your symptoms go away  Do not have sex until your symptoms go away  When you have sex, always use a condom  Condoms can help protect you from contact with fluids from your partner that may be causing your vaginitis  Prevent infection:   Wash your hands  with soap and water after you use the toilet  Wipe from front to back  Do this after you urinate or have a bowel movement  This will prevent germs from getting into your vagina  Wash your vagina each day  Use mild, unscented soap  Let the area air dry  Follow up with your doctor as directed: You may need to return within 3 months for more testing to make sure the infection has not returned  Write down your questions so you remember to ask them during your visits  © Copyright [employer] 2022 Information is for End User's use only and may not be sold, redistributed or otherwise used for commercial purposes  All illustrations and images included in CareNotes® are the copyrighted property of [employer]  or Jessica Armando  The above information is an  only  It is not intended as medical advice for individual conditions or treatments  Talk to your doctor, nurse or pharmacist before following any medical regimen to see if it is safe and effective for you

## 2022-11-17 NOTE — PROGRESS NOTES
Diagnoses and all orders for this visit:    Encounter for gynecological examination with abnormal finding    Asymptomatic postmenopausal status    Encounter for screening mammogram for malignant neoplasm of breast  -     Mammo screening bilateral w 3d & cad; Future    Dermatitis  -     fluconazole (DIFLUCAN) 150 mg tablet; Take 1 tablet (150 mg total) by mouth once for 1 dose Once and repeat in 3 days  -     nystatin-triamcinolone (MYCOLOG-II) ointment; Apply topically 2 (two) times a day for 14 days      Call as needed, encouraged calcium/vit D in her diet, call with any PMB, all questions answered  If dermatitis symptoms do not improve she agrees to return for a vulvar biopsy  Will stop liner use  Pleasant 59 y o  postmenopausal female here for annual exam  She denies postmenopausal bleeding  Denies history of abnormal pap smears, last Pap 2020 nml and HPV neg, pap NOT done today  Denies vaginal issues other than chronic itching for the past year  The itching seems more external at times  Denies pelvic pain/dyspareunia  Denies postmenopausal issues  Sexually active  Monogamous for over 20 yrs  Colonoscopy due 2024   Mammogram 05/02/2022 normal       Past Medical History:   Diagnosis Date   • High blood pressure    • High cholesterol    • Pre-diabetes      Past Surgical History:   Procedure Laterality Date   • BREAST CYST EXCISION Left 01/01/1980    neg     Family History   Problem Relation Age of Onset   • Diabetes Mother    • Heart disease Mother    • Alzheimer's disease Father    • Hypertension Father    • No Known Problems Sister    • No Known Problems Maternal Grandmother    • No Known Problems Paternal Grandmother    • No Known Problems Sister    • No Known Problems Sister    • No Known Problems Sister    • No Known Problems Daughter    • No Known Problems Daughter    • No Known Problems Maternal Aunt    • No Known Problems Maternal Aunt    • No Known Problems Paternal Aunt    • Breast cancer Neg Hx • Colon cancer Neg Hx    • Ovarian cancer Neg Hx    • Uterine cancer Neg Hx    • Cervical cancer Neg Hx      Social History     Tobacco Use   • Smoking status: Never   • Smokeless tobacco: Never   Vaping Use   • Vaping Use: Never used   Substance Use Topics   • Alcohol use: Yes   • Drug use: No       Current Outpatient Medications:   •  carvedilol (COREG) 25 mg tablet, Take 1 tablet (25 mg total) by mouth 2 (two) times a day with meals, Disp: 180 tablet, Rfl: 3  •  fluconazole (DIFLUCAN) 150 mg tablet, Take 1 tablet (150 mg total) by mouth once for 1 dose Once and repeat in 3 days, Disp: 2 tablet, Rfl: 0  •  NIFEdipine ER (ADALAT CC) 60 MG 24 hr tablet, Take 1 tablet (60 mg total) by mouth 2 (two) times a day, Disp: 180 tablet, Rfl: 3  •  nystatin-triamcinolone (MYCOLOG-II) ointment, Apply topically 2 (two) times a day for 14 days, Disp: 30 g, Rfl: 1  Patient Active Problem List    Diagnosis Date Noted   • Hiatal hernia 2022   • Flank pain 2022   • Hyperlipidemia 2021   • Pre-diabetes    • Asymptomatic postmenopausal status 2020   • Hypertensive kidney disease with stage 2 chronic kidney disease 10/18/2018   • Vitamin D deficiency 10/18/2018   • Mild mitral regurgitation by prior echocardiography 2018   • Essential hypertension 2018       No Known Allergies    OB History    Para Term  AB Living   4 3 3   1 3   SAB IAB Ectopic Multiple Live Births   1       3      # Outcome Date GA Lbr Buddy/2nd Weight Sex Delivery Anes PTL Lv   4 SAB            3 Term            2 Term            1 Term              5 grandchildren and 2 great-grandchildren, oldest daughter is in college doing a major and minor-very proud mom! Works at [employer]:    22 0953   Weight: 76 7 kg (169 lb)   Height: 5' 2" (1 575 m)     Body mass index is 30 91 kg/m²  Review of Systems   Constitutional: Negative for chills, fatigue, fever and unexpected weight change     Respiratory: Negative for shortness of breath  Gastrointestinal: Negative for anal bleeding, blood in stool, constipation and diarrhea  Genitourinary: Negative for difficulty urinating, dysuria and hematuria  Physical Exam   Constitutional: She appears well-developed and well-nourished  No distress  HENT: atraumatic, EOMI  Head: Normocephalic  Neck: Normal range of motion  Neck supple  Pulmonary: Effort normal   Breasts: bilateral without masses, skin changes or nipple discharge  Bilaterally soft and warm to touch  No areas of erythema or pain  Abdominal: Soft  Pelvic exam was performed with patient supine  No labial fusion  There is no rash, tenderness, lesion or injury on the right labia  There is no rash, tenderness, lesion or injury on the left labia  Urethral meatus does not show any tenderness, inflammation or discharge  Palpation of midline bladder without pain or discomfort  Uterus is not deviated, not enlarged, not fixed and not tender  Cervix exhibits no motion tenderness, no discharge and no friability  Right adnexum displays no mass, no tenderness and no fullness  Left adnexum displays no mass, no tenderness and no fullness  No erythema or tenderness in the vagina  No foreign body in the vagina  No signs of injury around the vagina or anus  Perineum without lesions, signs of injury, erythema or swelling  SMALL amount of yeasty vaginal discharge found  Lymphadenopathy:        Right: No inguinal adenopathy present  Left: No inguinal adenopathy present

## 2022-11-17 NOTE — PROGRESS NOTES
Patient presents for: yearly    Menarche- 9  Last Pap Smear- 09/01/2020  Hx of abnormal paps-never  Birth control-    Mammogram- 05/02/2022  DXA-not on file   Colonoscopy- 12/13/14    Smoking status-never  Last sexual activity-yes  Family history of uterine, ovarian, cervical or breast cancer-    Two Maternal great aunts  had breast cancer     Concerns-

## 2022-11-30 ENCOUNTER — RA CDI HCC (OUTPATIENT)
Dept: OTHER | Facility: HOSPITAL | Age: 57
End: 2022-11-30

## 2022-11-30 NOTE — PROGRESS NOTES
[facility]  coding opportunities       Chart reviewed, no opportunity found: CHART REVIEWED, NO OPPORTUNITY FOUND        Patients Insurance        Commercial Insurance: Dallin Cedillo

## 2022-12-07 ENCOUNTER — OFFICE VISIT (OUTPATIENT)
Dept: INTERNAL MEDICINE CLINIC | Facility: CLINIC | Age: 57
End: 2022-12-07

## 2022-12-07 ENCOUNTER — APPOINTMENT (OUTPATIENT)
Dept: LAB | Facility: CLINIC | Age: 57
End: 2022-12-07

## 2022-12-07 VITALS
HEIGHT: 62 IN | HEART RATE: 74 BPM | BODY MASS INDEX: 31.06 KG/M2 | SYSTOLIC BLOOD PRESSURE: 98 MMHG | DIASTOLIC BLOOD PRESSURE: 76 MMHG | WEIGHT: 168.8 LBS | OXYGEN SATURATION: 99 %

## 2022-12-07 DIAGNOSIS — Z00.00 HEALTH CARE MAINTENANCE: Primary | ICD-10-CM

## 2022-12-07 DIAGNOSIS — Z00.00 HEALTH CARE MAINTENANCE: ICD-10-CM

## 2022-12-07 LAB
ABO GROUP BLD: NORMAL
ANION GAP SERPL CALCULATED.3IONS-SCNC: 6 MMOL/L (ref 4–13)
BLD GP AB SCN SERPL QL: NEGATIVE
BUN SERPL-MCNC: 17 MG/DL (ref 5–25)
CALCIUM SERPL-MCNC: 9.2 MG/DL (ref 8.3–10.1)
CHLORIDE SERPL-SCNC: 108 MMOL/L (ref 96–108)
CO2 SERPL-SCNC: 26 MMOL/L (ref 21–32)
CREAT SERPL-MCNC: 1.25 MG/DL (ref 0.6–1.3)
EST. AVERAGE GLUCOSE BLD GHB EST-MCNC: 134 MG/DL
GFR SERPL CREATININE-BSD FRML MDRD: 47 ML/MIN/1.73SQ M
GLUCOSE P FAST SERPL-MCNC: 133 MG/DL (ref 65–99)
HBA1C MFR BLD: 6.3 %
POTASSIUM SERPL-SCNC: 3.6 MMOL/L (ref 3.5–5.3)
RH BLD: POSITIVE
SODIUM SERPL-SCNC: 140 MMOL/L (ref 135–147)
SPECIMEN EXPIRATION DATE: NORMAL

## 2022-12-07 NOTE — PROGRESS NOTES
Assessment/Plan:       Diagnoses and all orders for this visit:    Health care maintenance  -     Basic metabolic panel; Future  -     Hemoglobin A1C; Future  -     Type and screen; Future                Subjective:      Patient ID: Herve Castaneda is a 59 y o  female  Follow-up visit for a 57-year-old female  Hypertensive  Hemoglobin A1c done about 6 months ago had crept up into the diabetic range  She had been instructed to reduce carbohydrate intake  We can recheck that value today    Her immediate life issue is that her son who has some kind of glomerulonephritis has advanced kidney disease and will need a transplant and she wants to see if she is a match  She will have to go through her nephrologist but we can at least check her BUN, creatinine, A1c, and blood type      The following portions of the patient's history were reviewed and updated as appropriate:   She has a past medical history of High blood pressure, High cholesterol, and Pre-diabetes  ,  does not have any pertinent problems on file  ,   has a past surgical history that includes Breast cyst excision (Left, 01/01/1980)  ,  family history includes Alzheimer's disease in her father; Diabetes in her mother; Heart disease in her mother; Hypertension in her father; No Known Problems in her daughter, daughter, maternal aunt, maternal aunt, maternal grandmother, paternal aunt, paternal grandmother, sister, sister, sister, and sister  ,   reports that she has never smoked  She has never used smokeless tobacco  She reports current alcohol use  She reports that she does not use drugs  ,  has No Known Allergies     Current Outpatient Medications   Medication Sig Dispense Refill   • carvedilol (COREG) 25 mg tablet Take 1 tablet (25 mg total) by mouth 2 (two) times a day with meals 180 tablet 3   • NIFEdipine ER (ADALAT CC) 60 MG 24 hr tablet Take 1 tablet (60 mg total) by mouth 2 (two) times a day 180 tablet 3     No current facility-administered medications for this visit  Review of Systems   Psychiatric/Behavioral: The patient is nervous/anxious  Objective:  Vitals:    12/07/22 0859   BP: 98/76   Pulse: 74   SpO2: 99%      Physical Exam  Constitutional:       Appearance: Normal appearance  Cardiovascular:      Rate and Rhythm: Normal rate  Pulmonary:      Effort: Pulmonary effort is normal       Breath sounds: Normal breath sounds  Neurological:      General: No focal deficit present  Mental Status: She is alert  Psychiatric:         Judgment: Judgment normal            There are no Patient Instructions on file for this visit

## 2022-12-07 NOTE — PATIENT INSTRUCTIONS
Recheck renal function and A1c today  Follow-up in July  Any further recommendations will depend upon results of the studies

## 2023-05-19 ENCOUNTER — HOSPITAL ENCOUNTER (OUTPATIENT)
Age: 58
Discharge: HOME | End: 2023-05-19

## 2023-05-19 VITALS — BODY MASS INDEX: 30.91 KG/M2 | WEIGHT: 168 LBS | HEIGHT: 62 IN

## 2023-05-19 DIAGNOSIS — Z12.31 ENCOUNTER FOR SCREENING MAMMOGRAM FOR MALIGNANT NEOPLASM OF BREAST: ICD-10-CM

## 2023-06-29 ENCOUNTER — RA CDI HCC (OUTPATIENT)
Dept: OTHER | Facility: HOSPITAL | Age: 58
End: 2023-06-29

## 2023-07-10 ENCOUNTER — OFFICE VISIT (OUTPATIENT)
Age: 58
End: 2023-07-10
Payer: COMMERCIAL

## 2023-07-10 ENCOUNTER — APPOINTMENT (OUTPATIENT)
Age: 58
End: 2023-07-10
Payer: COMMERCIAL

## 2023-07-10 VITALS
DIASTOLIC BLOOD PRESSURE: 82 MMHG | HEART RATE: 77 BPM | WEIGHT: 170.4 LBS | SYSTOLIC BLOOD PRESSURE: 130 MMHG | BODY MASS INDEX: 31.36 KG/M2 | OXYGEN SATURATION: 99 % | HEIGHT: 62 IN

## 2023-07-10 DIAGNOSIS — E78.5 HYPERLIPIDEMIA, UNSPECIFIED HYPERLIPIDEMIA TYPE: ICD-10-CM

## 2023-07-10 DIAGNOSIS — N18.2 HYPERTENSIVE KIDNEY DISEASE WITH STAGE 2 CHRONIC KIDNEY DISEASE: ICD-10-CM

## 2023-07-10 DIAGNOSIS — I10 ESSENTIAL HYPERTENSION: ICD-10-CM

## 2023-07-10 DIAGNOSIS — I10 ESSENTIAL HYPERTENSION: Primary | ICD-10-CM

## 2023-07-10 DIAGNOSIS — I34.0 MILD MITRAL REGURGITATION BY PRIOR ECHOCARDIOGRAPHY: ICD-10-CM

## 2023-07-10 DIAGNOSIS — E55.9 VITAMIN D DEFICIENCY: ICD-10-CM

## 2023-07-10 DIAGNOSIS — I12.9 HYPERTENSIVE KIDNEY DISEASE WITH STAGE 2 CHRONIC KIDNEY DISEASE: ICD-10-CM

## 2023-07-10 DIAGNOSIS — R73.03 PRE-DIABETES: ICD-10-CM

## 2023-07-10 LAB
25(OH)D3 SERPL-MCNC: 27.1 NG/ML (ref 30–100)
ALBUMIN SERPL BCP-MCNC: 3.8 G/DL (ref 3.5–5)
ALP SERPL-CCNC: 75 U/L (ref 46–116)
ALT SERPL W P-5'-P-CCNC: 21 U/L (ref 12–78)
ANION GAP SERPL CALCULATED.3IONS-SCNC: 6 MMOL/L
AST SERPL W P-5'-P-CCNC: 14 U/L (ref 5–45)
BASOPHILS # BLD AUTO: 0.03 THOUSANDS/ÂΜL (ref 0–0.1)
BASOPHILS NFR BLD AUTO: 1 % (ref 0–1)
BILIRUB SERPL-MCNC: 0.43 MG/DL (ref 0.2–1)
BUN SERPL-MCNC: 19 MG/DL (ref 5–25)
CALCIUM SERPL-MCNC: 9.3 MG/DL (ref 8.3–10.1)
CHLORIDE SERPL-SCNC: 108 MMOL/L (ref 96–108)
CHOLEST SERPL-MCNC: 180 MG/DL
CO2 SERPL-SCNC: 27 MMOL/L (ref 21–32)
CREAT SERPL-MCNC: 1.16 MG/DL (ref 0.6–1.3)
EOSINOPHIL # BLD AUTO: 0.06 THOUSAND/ÂΜL (ref 0–0.61)
EOSINOPHIL NFR BLD AUTO: 1 % (ref 0–6)
ERYTHROCYTE [DISTWIDTH] IN BLOOD BY AUTOMATED COUNT: 12.8 % (ref 11.6–15.1)
GFR SERPL CREATININE-BSD FRML MDRD: 51 ML/MIN/1.73SQ M
GLUCOSE P FAST SERPL-MCNC: 127 MG/DL (ref 65–99)
HCT VFR BLD AUTO: 40.2 % (ref 34.8–46.1)
HDLC SERPL-MCNC: 59 MG/DL
HGB BLD-MCNC: 13.2 G/DL (ref 11.5–15.4)
IMM GRANULOCYTES # BLD AUTO: 0.01 THOUSAND/UL (ref 0–0.2)
IMM GRANULOCYTES NFR BLD AUTO: 0 % (ref 0–2)
LDLC SERPL CALC-MCNC: 103 MG/DL (ref 0–100)
LYMPHOCYTES # BLD AUTO: 2.33 THOUSANDS/ÂΜL (ref 0.6–4.47)
LYMPHOCYTES NFR BLD AUTO: 51 % (ref 14–44)
MCH RBC QN AUTO: 27.4 PG (ref 26.8–34.3)
MCHC RBC AUTO-ENTMCNC: 32.8 G/DL (ref 31.4–37.4)
MCV RBC AUTO: 83 FL (ref 82–98)
MONOCYTES # BLD AUTO: 0.49 THOUSAND/ÂΜL (ref 0.17–1.22)
MONOCYTES NFR BLD AUTO: 11 % (ref 4–12)
NEUTROPHILS # BLD AUTO: 1.6 THOUSANDS/ÂΜL (ref 1.85–7.62)
NEUTS SEG NFR BLD AUTO: 36 % (ref 43–75)
NRBC BLD AUTO-RTO: 0 /100 WBCS
PLATELET # BLD AUTO: 373 THOUSANDS/UL (ref 149–390)
PMV BLD AUTO: 11 FL (ref 8.9–12.7)
POTASSIUM SERPL-SCNC: 3.8 MMOL/L (ref 3.5–5.3)
PROT SERPL-MCNC: 7.7 G/DL (ref 6.4–8.4)
RBC # BLD AUTO: 4.82 MILLION/UL (ref 3.81–5.12)
SODIUM SERPL-SCNC: 141 MMOL/L (ref 135–147)
TRIGL SERPL-MCNC: 89 MG/DL
TSH SERPL DL<=0.05 MIU/L-ACNC: 2.05 UIU/ML (ref 0.45–4.5)
WBC # BLD AUTO: 4.52 THOUSAND/UL (ref 4.31–10.16)

## 2023-07-10 PROCEDURE — 82306 VITAMIN D 25 HYDROXY: CPT

## 2023-07-10 PROCEDURE — 85025 COMPLETE CBC W/AUTO DIFF WBC: CPT

## 2023-07-10 PROCEDURE — 80053 COMPREHEN METABOLIC PANEL: CPT

## 2023-07-10 PROCEDURE — 83036 HEMOGLOBIN GLYCOSYLATED A1C: CPT

## 2023-07-10 PROCEDURE — 84443 ASSAY THYROID STIM HORMONE: CPT

## 2023-07-10 PROCEDURE — 99214 OFFICE O/P EST MOD 30 MIN: CPT | Performed by: INTERNAL MEDICINE

## 2023-07-10 PROCEDURE — 80061 LIPID PANEL: CPT

## 2023-07-10 PROCEDURE — 36415 COLL VENOUS BLD VENIPUNCTURE: CPT

## 2023-07-10 NOTE — PROGRESS NOTES
Assessment/Plan:       Diagnoses and all orders for this visit:    Essential hypertension  -     Lipid Panel with Direct LDL reflex; Future  -     CBC and differential; Future  -     Hemoglobin A1C; Future  -     Comprehensive metabolic panel; Future  -     TSH, 3rd generation with Free T4 reflex; Future  -     UA (URINE) with reflex to Scope  -     Albumin / creatinine urine ratio    Hypertensive kidney disease with stage 2 chronic kidney disease  -     Lipid Panel with Direct LDL reflex; Future  -     CBC and differential; Future  -     Hemoglobin A1C; Future  -     Comprehensive metabolic panel; Future  -     TSH, 3rd generation with Free T4 reflex; Future  -     UA (URINE) with reflex to Scope  -     Albumin / creatinine urine ratio    Mild mitral regurgitation by prior echocardiography  -     Echo complete w/ contrast if indicated; Future    Pre-diabetes  -     Lipid Panel with Direct LDL reflex; Future  -     CBC and differential; Future  -     Hemoglobin A1C; Future  -     Comprehensive metabolic panel; Future  -     TSH, 3rd generation with Free T4 reflex; Future  -     UA (URINE) with reflex to Scope  -     Albumin / creatinine urine ratio    Hyperlipidemia, unspecified hyperlipidemia type  -     Lipid Panel with Direct LDL reflex; Future  -     CBC and differential; Future  -     Hemoglobin A1C; Future  -     Comprehensive metabolic panel; Future  -     TSH, 3rd generation with Free T4 reflex; Future  -     UA (URINE) with reflex to Scope  -     Albumin / creatinine urine ratio    Vitamin D deficiency  -     Vitamin D 25 hydroxy; Future                Subjective:      Patient ID: Arnetta Denver is a 59 y.o. female.     Follow-up visit for a 61-year-old female  Hypertensive  Prediabetic  Mild mitral regurg      Vitamin D deficiency has been documented    Hemoglobin A1c a year ago had crept up to 6.6 but 6 months ago was back down to 6.3    A major issue in her life which is emotionally stressful is the fact that her son has some kind of glomerulonephritis and is knocking at the door of a transplant. However, he has been stable for the past year. Works outside [employer], no illicit drugs. Rare alcohol. The following portions of the patient's history were reviewed and updated as appropriate:   She has a past medical history of High blood pressure, High cholesterol, and Pre-diabetes. ,  does not have any pertinent problems on file. ,   has a past surgical history that includes Breast cyst excision (Left, 01/01/1980). ,  family history includes Alzheimer's disease in her father; Breast cancer in her maternal aunt and maternal aunt; Diabetes in her mother; Heart disease in her mother; Hypertension in her father; No Known Problems in her daughter, daughter, maternal aunt, maternal aunt, maternal grandmother, paternal aunt, paternal grandmother, sister, sister, sister, and sister. ,   reports that she has never smoked. She has never used smokeless tobacco. She reports current alcohol use. She reports that she does not use drugs. ,  has No Known Allergies. .  Current Outpatient Medications   Medication Sig Dispense Refill   • carvedilol (COREG) 25 mg tablet Take 1 tablet (25 mg total) by mouth 2 (two) times a day with meals 180 tablet 3   • NIFEdipine ER (ADALAT CC) 60 MG 24 hr tablet Take 1 tablet (60 mg total) by mouth 2 (two) times a day 180 tablet 3     No current facility-administered medications for this visit. Review of Systems   Musculoskeletal: Positive for arthralgias. All other systems reviewed and are negative. Objective:  Vitals:    07/10/23 0848   BP: 130/82   Pulse: 77   SpO2: 99%      Physical Exam  Constitutional:       Appearance: She is well-developed. Comments: Modestly overweight female patient who appears to be the stated age   HENT:      Head: Normocephalic and atraumatic. Eyes:      Pupils: Pupils are equal, round, and reactive to light.    Neck: Thyroid: No thyromegaly. Trachea: No tracheal deviation. Cardiovascular:      Rate and Rhythm: Normal rate and regular rhythm. Heart sounds: Normal heart sounds. No murmur heard. No gallop. Pulmonary:      Effort: No respiratory distress. Breath sounds: No wheezing or rales. Abdominal:      General: Bowel sounds are normal.      Palpations: Abdomen is soft. Tenderness: There is no abdominal tenderness. Musculoskeletal:         General: Swelling present. No tenderness or deformity. Normal range of motion. Cervical back: Normal range of motion and neck supple. Comments: Bilateral trace ankle edema   Skin:     General: Skin is warm. Neurological:      Mental Status: She is alert and oriented to person, place, and time.       Coordination: Coordination normal.   Psychiatric:         Judgment: Judgment normal.           Patient Instructions   Chronic diagnoses are stable    Obtain screening laboratory testing in 6 months follow-up recommended

## 2023-07-10 NOTE — PATIENT INSTRUCTIONS
Chronic diagnoses are stable    Obtain screening laboratory testing in 6 months follow-up recommended

## 2023-07-11 ENCOUNTER — TELEPHONE (OUTPATIENT)
Age: 58
End: 2023-07-11

## 2023-07-11 NOTE — TELEPHONE ENCOUNTER
----- Message from Ximena Pompa MD sent at 7/11/2023  7:46 AM EDT -----  Please call the patient regarding her abnormal result.   Vitamin D just a bit low take 2000 units over-the-counter tablets daily

## 2023-07-11 NOTE — TELEPHONE ENCOUNTER
----- Message from Jordon Troy MD sent at 7/11/2023  7:46 AM EDT -----  Please call the patient regarding her abnormal result.   Vitamin D just a bit low take 2000 units over-the-counter tablets daily

## 2023-07-12 LAB
EST. AVERAGE GLUCOSE BLD GHB EST-MCNC: 137 MG/DL
HBA1C MFR BLD: 6.4 %

## 2023-07-13 ENCOUNTER — APPOINTMENT (OUTPATIENT)
Age: 58
End: 2023-07-13
Payer: COMMERCIAL

## 2023-07-13 LAB
BILIRUB UR QL STRIP: NEGATIVE
CLARITY UR: CLEAR
COLOR UR: COLORLESS
CREAT UR-MCNC: 81.3 MG/DL
GLUCOSE UR STRIP-MCNC: NEGATIVE MG/DL
HGB UR QL STRIP.AUTO: NEGATIVE
KETONES UR STRIP-MCNC: NEGATIVE MG/DL
LEUKOCYTE ESTERASE UR QL STRIP: NEGATIVE
MICROALBUMIN UR-MCNC: 5.5 MG/L (ref 0–20)
MICROALBUMIN/CREAT 24H UR: 7 MG/G CREATININE (ref 0–30)
NITRITE UR QL STRIP: NEGATIVE
PH UR STRIP.AUTO: 7 [PH]
PROT UR STRIP-MCNC: NEGATIVE MG/DL
SP GR UR STRIP.AUTO: 1.01 (ref 1–1.03)
UROBILINOGEN UR STRIP-ACNC: <2 MG/DL

## 2023-07-13 PROCEDURE — 81003 URINALYSIS AUTO W/O SCOPE: CPT | Performed by: INTERNAL MEDICINE

## 2023-07-13 PROCEDURE — 82043 UR ALBUMIN QUANTITATIVE: CPT | Performed by: INTERNAL MEDICINE

## 2023-07-13 PROCEDURE — 82570 ASSAY OF URINE CREATININE: CPT | Performed by: INTERNAL MEDICINE

## 2023-07-20 ENCOUNTER — HOSPITAL ENCOUNTER (OUTPATIENT)
Dept: NON INVASIVE DIAGNOSTICS | Facility: CLINIC | Age: 58
Discharge: HOME | End: 2023-07-20
Payer: COMMERCIAL

## 2023-07-20 VITALS
DIASTOLIC BLOOD PRESSURE: 82 MMHG | SYSTOLIC BLOOD PRESSURE: 130 MMHG | HEIGHT: 62 IN | WEIGHT: 170 LBS | HEART RATE: 67 BPM | BODY MASS INDEX: 31.28 KG/M2

## 2023-07-20 DIAGNOSIS — I34.0 MILD MITRAL REGURGITATION BY PRIOR ECHOCARDIOGRAPHY: ICD-10-CM

## 2023-07-20 LAB
AORTIC ROOT: 2.6 CM
AORTIC VALVE MEAN VELOCITY: 9.1 M/S
APICAL FOUR CHAMBER EJECTION FRACTION: 78 %
AV MEAN GRADIENT: 4 MMHG
AV PEAK GRADIENT: 7 MMHG
DOP CALC AO PEAK VEL: 1.3 M/S
DOP CALC AO VTI: 28.14 CM
E WAVE DECELERATION TIME: 204 MS
FRACTIONAL SHORTENING: 46 % (ref 28–44)
INTERVENTRICULAR SEPTUM IN DIASTOLE (PARASTERNAL SHORT AXIS VIEW): 1.2 CM
INTERVENTRICULAR SEPTUM: 1.2 CM (ref 0.6–1.1)
LAAS-AP2: 8.9 CM2
LAAS-AP4: 11 CM2
LEFT ATRIUM AREA SYSTOLE SINGLE PLANE A4C: 12.6 CM2
LEFT ATRIUM SIZE: 3.4 CM
LEFT ATRIUM VOLUME (MOD BIPLANE): 20 ML
LEFT INTERNAL DIMENSION IN SYSTOLE: 1.9 CM (ref 2.1–4)
LEFT VENTRICULAR INTERNAL DIMENSION IN DIASTOLE: 3.5 CM (ref 3.5–6)
LEFT VENTRICULAR POSTERIOR WALL IN END DIASTOLE: 0.9 CM
LEFT VENTRICULAR STROKE VOLUME: 40 ML
LVSV (TEICH): 40 ML
MV E'TISSUE VEL-SEP: 6 CM/S
MV PEAK A VEL: 0.77 M/S
MV PEAK E VEL: 68 CM/S
MV STENOSIS PRESSURE HALF TIME: 59 MS
MV VALVE AREA P 1/2 METHOD: 3.73 CM2
RIGHT ATRIUM AREA SYSTOLE A4C: 7.3 CM2
RIGHT VENTRICLE ID DIMENSION: 2.9 CM
SL CV LEFT ATRIUM LENGTH A2C: 4.2 CM
SL CV LV EF: 60
SL CV PED ECHO LEFT VENTRICLE DIASTOLIC VOLUME (MOD BIPLANE) 2D: 51 ML
SL CV PED ECHO LEFT VENTRICLE SYSTOLIC VOLUME (MOD BIPLANE) 2D: 11 ML
TR MAX PG: 20 MMHG
TR PEAK VELOCITY: 2.2 M/S
TRICUSPID ANNULAR PLANE SYSTOLIC EXCURSION: 2 CM
TRICUSPID VALVE PEAK REGURGITATION VELOCITY: 2.21 M/S

## 2023-07-20 PROCEDURE — 93306 TTE W/DOPPLER COMPLETE: CPT

## 2023-07-20 PROCEDURE — 93306 TTE W/DOPPLER COMPLETE: CPT | Performed by: INTERNAL MEDICINE

## 2023-11-11 DIAGNOSIS — I10 HYPERTENSION, UNSPECIFIED TYPE: ICD-10-CM

## 2023-11-13 RX ORDER — NIFEDIPINE 60 MG/1
60 TABLET, FILM COATED, EXTENDED RELEASE ORAL 2 TIMES DAILY
Qty: 180 TABLET | Refills: 3 | Status: SHIPPED | OUTPATIENT
Start: 2023-11-13

## 2023-12-01 DIAGNOSIS — I10 HYPERTENSION, UNSPECIFIED TYPE: ICD-10-CM

## 2023-12-01 RX ORDER — CARVEDILOL 25 MG/1
25 TABLET ORAL 2 TIMES DAILY WITH MEALS
Qty: 180 TABLET | Refills: 3 | Status: SHIPPED | OUTPATIENT
Start: 2023-12-01

## 2024-01-16 ENCOUNTER — TELEPHONE (OUTPATIENT)
Age: 59
End: 2024-01-16

## 2024-01-21 ENCOUNTER — HOSPITAL ENCOUNTER (EMERGENCY)
Facility: HOSPITAL | Age: 59
Discharge: HOME | End: 2024-01-21
Attending: EMERGENCY MEDICINE
Payer: COMMERCIAL

## 2024-01-21 VITALS
SYSTOLIC BLOOD PRESSURE: 140 MMHG | HEIGHT: 62 IN | TEMPERATURE: 98 F | WEIGHT: 169 LBS | HEART RATE: 62 BPM | DIASTOLIC BLOOD PRESSURE: 83 MMHG | BODY MASS INDEX: 31.1 KG/M2 | RESPIRATION RATE: 18 BRPM | OXYGEN SATURATION: 99 %

## 2024-01-21 DIAGNOSIS — R42 LIGHTHEADEDNESS: Primary | ICD-10-CM

## 2024-01-21 LAB
ALBUMIN SERPL BCP-MCNC: 4.5 G/DL (ref 3.5–5)
ALP SERPL-CCNC: 72 U/L (ref 34–104)
ALT SERPL W P-5'-P-CCNC: 13 U/L (ref 7–52)
ANION GAP SERPL CALCULATED.3IONS-SCNC: 6 MMOL/L
AST SERPL W P-5'-P-CCNC: 14 U/L (ref 13–39)
BASOPHILS # BLD AUTO: 0.02 THOUSANDS/ÂΜL (ref 0–0.1)
BASOPHILS NFR BLD AUTO: 0 % (ref 0–1)
BILIRUB DIRECT SERPL-MCNC: 0.1 MG/DL (ref 0–0.2)
BILIRUB SERPL-MCNC: 0.32 MG/DL (ref 0.2–1)
BUN SERPL-MCNC: 20 MG/DL (ref 5–25)
CALCIUM SERPL-MCNC: 9.7 MG/DL (ref 8.4–10.2)
CARDIAC TROPONIN I PNL SERPL HS: 6 NG/L
CHLORIDE SERPL-SCNC: 106 MMOL/L (ref 96–108)
CO2 SERPL-SCNC: 28 MMOL/L (ref 21–32)
CREAT SERPL-MCNC: 1.44 MG/DL (ref 0.6–1.3)
EOSINOPHIL # BLD AUTO: 0.05 THOUSAND/ÂΜL (ref 0–0.61)
EOSINOPHIL NFR BLD AUTO: 1 % (ref 0–6)
ERYTHROCYTE [DISTWIDTH] IN BLOOD BY AUTOMATED COUNT: 12.8 % (ref 11.6–15.1)
GFR SERPL CREATININE-BSD FRML MDRD: 39 ML/MIN/1.73SQ M
GLUCOSE SERPL-MCNC: 97 MG/DL (ref 65–140)
HCT VFR BLD AUTO: 42.4 % (ref 34.8–46.1)
HGB BLD-MCNC: 14.2 G/DL (ref 11.5–15.4)
IMM GRANULOCYTES # BLD AUTO: 0.01 THOUSAND/UL (ref 0–0.2)
IMM GRANULOCYTES NFR BLD AUTO: 0 % (ref 0–2)
INR PPP: 0.99 (ref 0.84–1.19)
LYMPHOCYTES # BLD AUTO: 2.21 THOUSANDS/ÂΜL (ref 0.6–4.47)
LYMPHOCYTES NFR BLD AUTO: 47 % (ref 14–44)
MCH RBC QN AUTO: 27.6 PG (ref 26.8–34.3)
MCHC RBC AUTO-ENTMCNC: 33.5 G/DL (ref 31.4–37.4)
MCV RBC AUTO: 82 FL (ref 82–98)
MONOCYTES # BLD AUTO: 0.41 THOUSAND/ÂΜL (ref 0.17–1.22)
MONOCYTES NFR BLD AUTO: 9 % (ref 4–12)
NEUTROPHILS # BLD AUTO: 2.06 THOUSANDS/ÂΜL (ref 1.85–7.62)
NEUTS SEG NFR BLD AUTO: 43 % (ref 43–75)
NRBC BLD AUTO-RTO: 0 /100 WBCS
PLATELET # BLD AUTO: 344 THOUSANDS/UL (ref 149–390)
PMV BLD AUTO: 10.6 FL (ref 8.9–12.7)
POTASSIUM SERPL-SCNC: 3.6 MMOL/L (ref 3.5–5.3)
PROT SERPL-MCNC: 7.4 G/DL (ref 6.4–8.4)
PROTHROMBIN TIME: 13.7 SECONDS (ref 11.6–14.5)
RBC # BLD AUTO: 5.15 MILLION/UL (ref 3.81–5.12)
SODIUM SERPL-SCNC: 140 MMOL/L (ref 135–147)
WBC # BLD AUTO: 4.76 THOUSAND/UL (ref 4.31–10.16)

## 2024-01-21 PROCEDURE — 80076 HEPATIC FUNCTION PANEL: CPT | Performed by: EMERGENCY MEDICINE

## 2024-01-21 PROCEDURE — 96360 HYDRATION IV INFUSION INIT: CPT

## 2024-01-21 PROCEDURE — 99284 EMERGENCY DEPT VISIT MOD MDM: CPT | Performed by: EMERGENCY MEDICINE

## 2024-01-21 PROCEDURE — 36415 COLL VENOUS BLD VENIPUNCTURE: CPT | Performed by: EMERGENCY MEDICINE

## 2024-01-21 PROCEDURE — 85610 PROTHROMBIN TIME: CPT | Performed by: EMERGENCY MEDICINE

## 2024-01-21 PROCEDURE — 85025 COMPLETE CBC W/AUTO DIFF WBC: CPT | Performed by: EMERGENCY MEDICINE

## 2024-01-21 PROCEDURE — 93005 ELECTROCARDIOGRAM TRACING: CPT

## 2024-01-21 PROCEDURE — 84484 ASSAY OF TROPONIN QUANT: CPT | Performed by: EMERGENCY MEDICINE

## 2024-01-21 PROCEDURE — 99285 EMERGENCY DEPT VISIT HI MDM: CPT

## 2024-01-21 PROCEDURE — 80048 BASIC METABOLIC PNL TOTAL CA: CPT | Performed by: EMERGENCY MEDICINE

## 2024-01-21 RX ADMIN — SODIUM CHLORIDE 1000 ML: 0.9 INJECTION, SOLUTION INTRAVENOUS at 17:54

## 2024-01-21 NOTE — ED PROVIDER NOTES
"History  Chief Complaint   Patient presents with    Dizziness     Pt presents ambulatory c/o \"dizziness and nausea that started today at work. 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  Patient reports she was at work and developed some dizziness and some nausea.  Patient reports that when she sits her symptoms resolved but when she stands up she has symptomatology.  Patient reports no symptomatology when I walked in the room sitting in the bed.  She reports that when she stands she reproduces the pathology where she feels somewhat dizzy.  Patient reports that she is not unsteady and can walk normally.  I was able to ambulate the patient in the room she walked normally.  She reports she just feels somewhat lightheaded when she stands up.  She denies any fever or chills.  Apparently a single family member was sick recently but she does not believe she has any infectious process.  Patient reports that she is on blood pressure medicine for a long time and I was concerned that possibly her blood pressure medicine was making her feel somewhat dizzy.  Patient again is asymptomatic currently other than when she stands she has a slight sense that the symptoms return.  Past medical history of high blood pressure cholesterol prediabetes  Family history noncontributory no social history, non-smoker, employed    Prior to Admission Medications   Prescriptions Last Dose Informant Patient Reported? Taking?   NIFEdipine ER (ADALAT CC) 60 MG 24 hr tablet   No No   Sig: TAKE 1 TABLET BY MOUTH 2 TIMES A DAY.   carvedilol (COREG) 25 mg tablet   No No   Sig: TAKE 1 TABLET BY MOUTH TWICE A DAY WITH FOOD      Facility-Administered Medications: None       Past Medical History:   Diagnosis Date    High blood pressure     High cholesterol     Pre-diabetes        Past Surgical History:   Procedure " Laterality Date    BREAST CYST EXCISION Left 01/01/1980    neg       Family History   Problem Relation Age of Onset    Diabetes Mother     Heart disease Mother     Alzheimer's disease Father     Hypertension Father     No Known Problems Sister     No Known Problems Sister     No Known Problems Sister     No Known Problems Sister     No Known Problems Daughter     No Known Problems Daughter     No Known Problems Maternal Grandmother     No Known Problems Paternal Grandmother     No Known Problems Maternal Aunt     No Known Problems Maternal Aunt     Breast cancer Maternal Aunt     Breast cancer Maternal Aunt     No Known Problems Paternal Aunt     Colon cancer Neg Hx     Ovarian cancer Neg Hx     Uterine cancer Neg Hx     Cervical cancer Neg Hx      I have reviewed and agree with the history as documented.    E-Cigarette/Vaping    E-Cigarette Use Never User      E-Cigarette/Vaping Substances    Nicotine No     THC No     CBD No     Flavoring No     Other No     Unknown No      Social History     Tobacco Use    Smoking status: Never    Smokeless tobacco: Never   Vaping Use    Vaping status: Never Used   Substance Use Topics    Alcohol use: Yes    Drug use: No       Review of Systems   Constitutional:  Negative for diaphoresis, fatigue and fever.   HENT:  Negative for congestion, ear pain, nosebleeds and sore throat.    Eyes:  Negative for photophobia, pain, discharge and visual disturbance.   Respiratory:  Negative for cough, choking, chest tightness, shortness of breath and wheezing.    Cardiovascular:  Negative for chest pain and palpitations.   Gastrointestinal:  Negative for abdominal distention, abdominal pain, diarrhea and vomiting.   Genitourinary:  Negative for dysuria, flank pain and frequency.   Musculoskeletal:  Negative for back pain, gait problem and joint swelling.   Skin:  Negative for color change and rash.   Neurological:  Positive for dizziness and light-headedness. Negative for syncope and  headaches.   Psychiatric/Behavioral:  Negative for behavioral problems and confusion. The patient is not nervous/anxious.    All other systems reviewed and are negative.      Physical Exam  Physical Exam  Vitals and nursing note reviewed.   Constitutional:       Appearance: She is well-developed.   HENT:      Head: Normocephalic.      Right Ear: External ear normal.      Left Ear: External ear normal.      Nose: Nose normal.      Mouth/Throat:      Mouth: Mucous membranes are moist.      Pharynx: Oropharynx is clear.   Eyes:      General: Lids are normal.      Extraocular Movements: Extraocular movements intact.      Pupils: Pupils are equal, round, and reactive to light.   Cardiovascular:      Rate and Rhythm: Normal rate and regular rhythm.      Pulses: Normal pulses.      Heart sounds: Normal heart sounds.   Pulmonary:      Effort: Pulmonary effort is normal. No respiratory distress.      Breath sounds: Normal breath sounds.   Abdominal:      General: Abdomen is flat. Bowel sounds are normal.      Tenderness: There is no abdominal tenderness.   Musculoskeletal:         General: No deformity. Normal range of motion.      Cervical back: Normal range of motion and neck supple.   Skin:     General: Skin is warm and dry.   Neurological:      General: No focal deficit present.      Mental Status: She is alert and oriented to person, place, and time.      GCS: GCS eye subscore is 4. GCS verbal subscore is 5. GCS motor subscore is 6.      Cranial Nerves: Cranial nerves 2-12 are intact.      Sensory: Sensation is intact.      Motor: Motor function is intact.      Coordination: Coordination is intact.      Comments: Normal finger-to-nose, normal gait, no ataxia   Psychiatric:         Mood and Affect: Mood normal.         Vital Signs  ED Triage Vitals [01/21/24 1725]   Temperature Pulse Respirations Blood Pressure SpO2   98 °F (36.7 °C) 71 18 115/69 99 %      Temp Source Heart Rate Source Patient Position - Orthostatic VS  BP Location FiO2 (%)   Temporal Monitor Sitting Left arm --      Pain Score       --           Vitals:    01/21/24 1725 01/21/24 1800 01/21/24 1830   BP: 115/69 111/70 140/83   Pulse: 71 64 62   Patient Position - Orthostatic VS: Sitting           Visual Acuity  Visual Acuity      Flowsheet Row Most Recent Value   L Pupil Size (mm) 3   R Pupil Size (mm) 3            ED Medications  Medications   sodium chloride 0.9 % bolus 1,000 mL (0 mL Intravenous Stopped 1/21/24 1854)       Diagnostic Studies  Results Reviewed       Procedure Component Value Units Date/Time    Basic metabolic panel [[id number]]  (Abnormal) Collected: 01/21/24 1752    Lab Status: Final result Specimen: Blood from Arm, Left Updated: 01/21/24 1834     Sodium 140 mmol/L      Potassium 3.6 mmol/L      Chloride 106 mmol/L      CO2 28 mmol/L      ANION GAP 6 mmol/L      BUN 20 mg/dL      Creatinine 1.44 mg/dL      Glucose 97 mg/dL      Calcium 9.7 mg/dL      eGFR 39 ml/min/1.73sq m     Narrative:      National Kidney Disease Foundation guidelines for Chronic Kidney Disease (CKD):     Stage 1 with normal or high GFR (GFR > 90 mL/min/1.73 square meters)    Stage 2 Mild CKD (GFR = 60-89 mL/min/1.73 square meters)    Stage 3A Moderate CKD (GFR = 45-59 mL/min/1.73 square meters)    Stage 3B Moderate CKD (GFR = 30-44 mL/min/1.73 square meters)    Stage 4 Severe CKD (GFR = 15-29 mL/min/1.73 square meters)    Stage 5 End Stage CKD (GFR <15 mL/min/1.73 square meters)  Note: GFR calculation is accurate only with a steady state creatinine    Hepatic function panel [[id number]]  (Normal) Collected: 01/21/24 1752    Lab Status: Final result Specimen: Blood from Arm, Left Updated: 01/21/24 1834     Total Bilirubin 0.32 mg/dL      Bilirubin, Direct 0.10 mg/dL      Alkaline Phosphatase 72 U/L      AST 14 U/L      ALT 13 U/L      Total Protein 7.4 g/dL      Albumin 4.5 g/dL     HS Troponin 0hr (reflex protocol) [[id number]]  (Normal) Collected: 01/21/24 1752    Lab Status:  Final result Specimen: Blood from Arm, Left Updated: 01/21/24 1828     hs TnI 0hr 6 ng/L     HS Troponin I 2hr [[id number]]     Lab Status: No result Specimen: Blood     Protime-INR [[id number]]  (Normal) Collected: 01/21/24 1752    Lab Status: Final result Specimen: Blood from Arm, Left Updated: 01/21/24 1815     Protime 13.7 seconds      INR 0.99    CBC and differential [[id number]]  (Abnormal) Collected: 01/21/24 1752    Lab Status: Final result Specimen: Blood from Arm, Left Updated: 01/21/24 1802     WBC 4.76 Thousand/uL      RBC 5.15 Million/uL      Hemoglobin 14.2 g/dL      Hematocrit 42.4 %      MCV 82 fL      MCH 27.6 pg      MCHC 33.5 g/dL      RDW 12.8 %      MPV 10.6 fL      Platelets 344 Thousands/uL      nRBC 0 /100 WBCs      Neutrophils Relative 43 %      Immat GRANS % 0 %      Lymphocytes Relative 47 %      Monocytes Relative 9 %      Eosinophils Relative 1 %      Basophils Relative 0 %      Neutrophils Absolute 2.06 Thousands/µL      Immature Grans Absolute 0.01 Thousand/uL      Lymphocytes Absolute 2.21 Thousands/µL      Monocytes Absolute 0.41 Thousand/µL      Eosinophils Absolute 0.05 Thousand/µL      Basophils Absolute 0.02 Thousands/µL                    No orders to display              Procedures  ECG 12 Lead Documentation Only    Date/Time: 1/21/2024 7:20 PM    Performed by: Jose Daniel Calderon MD  Authorized by: Jose Daniel Calderon MD    Indications / Diagnosis:  Lightheadedness  ECG reviewed by me, the ED Provider: yes    Patient location:  ED  Previous ECG:     Previous ECG:  Compared to current    Comparison ECG info:  Tober eighth 2019    Similarity:  No change  Interpretation:     Interpretation: non-specific    Rate:     ECG rate:  January 21, 2024    ECG rate assessment: normal    Rhythm:     Rhythm: sinus rhythm    Comments:      Normal sinus rhythm, poor anterior forces, no acute change from prior EKG.           ED Course     Diagnostic testing  Electrolytes are within normal limits other than a  creatinine of 1.44 consistent with the patient's history of hypertension in the past  Liver functions were normal  Cardiac troponin was normal no sign of cardiac ischemia.  The patient had no chest pain but I was concerned about hypertensive cardiomyopathy or cardiac strain.  White count was normal at 4.7 no sign of inflammation hemoglobin was normal at 14 no sign of anemia.  Patient was markedly improved after IV fluids.  Patient was able to stand and there was no recurrence of her lightheadedness.                          SBIRT 20yo+      Flowsheet Row Most Recent Value   Initial Alcohol Screen: US AUDIT-C     1. How often do you have a drink containing alcohol? 0 Filed at: 01/21/2024 1730   2. How many drinks containing alcohol do you have on a typical day you are drinking?  0 Filed at: 01/21/2024 1730   3b. FEMALE Any Age, or MALE 65+: How often do you have 4 or more drinks on one occassion? 0 Filed at: 01/21/2024 1730   Audit-C Score 0 Filed at: 01/21/2024 1730   RAMU: How many times in the past year have you...    Used an illegal drug or used a prescription medication for non-medical reasons? Never Filed at: 01/21/2024 1730                      Medical Decision Making  Medical decision making 58-year-old female presents emergency department reports at work and when she would stand up she would feel lightheaded.  Patient reports no symptoms when she was sitting down.  Patient had persistent symptoms here when she would stand she would get lightheaded.  Patient's blood pressure was apparently high at work with blood pressure systolics in the 190s.  Her pressure here is normal.  Patient felt her symptoms might be related to her blood pressure but her blood pressure is currently normal.  She takes 2 antihypertensive.  If she was persistently hypotensive I advised cutting her nifedipine in half but at this point her medication seems appropriate.  Patient was improved markedly here with IV fluids.  There were no  further symptoms with her standing.  She was asymptomatic at discharge.  I believe the patient may have been somewhat dehydrated at work.  We discussed outpatient treatment and the need for ongoing evaluation and follow-up.  We discussed indications to return for return of her symptoms any progressive weakness or any problems.    Amount and/or Complexity of Data Reviewed  Labs: ordered.             Disposition  Final diagnoses:   Lightheadedness     Time reflects when diagnosis was documented in both MDM as applicable and the Disposition within this note       Time User Action Codes Description Comment    1/21/2024  7:04 PM Jose Daniel Calderon Add [R42] Lightheadedness           ED Disposition       ED Disposition   Discharge    Condition   Stable    Date/Time   Sun Jan 21, 2024 1904    Comment   Fartun Xavi discharge to home/self care.                   Follow-up Information       Follow up With Specialties Details Why Contact Info    Jac Ulrich MD Internal Medicine   [address] [place] [address]  [phone]              Discharge Medication List as of 1/21/2024  7:05 PM        CONTINUE these medications which have NOT CHANGED    Details   carvedilol (COREG) 25 mg tablet TAKE 1 TABLET BY MOUTH TWICE A DAY WITH FOOD, Starting Fri 12/1/2023, Normal      NIFEdipine ER (ADALAT CC) 60 MG 24 hr tablet TAKE 1 TABLET BY MOUTH 2 TIMES A DAY., Starting Mon 11/13/2023, Normal             No discharge procedures on file.    PDMP Review       None            ED Provider  Electronically Signed by             Jose Daniel Calderon MD  01/21/24 0598

## 2024-01-22 ENCOUNTER — OFFICE VISIT (OUTPATIENT)
Age: 59
End: 2024-01-22
Payer: COMMERCIAL

## 2024-01-22 VITALS
RESPIRATION RATE: 16 BRPM | BODY MASS INDEX: 31.54 KG/M2 | TEMPERATURE: 97.5 F | WEIGHT: 171.4 LBS | DIASTOLIC BLOOD PRESSURE: 80 MMHG | HEIGHT: 62 IN | HEART RATE: 56 BPM | SYSTOLIC BLOOD PRESSURE: 122 MMHG | OXYGEN SATURATION: 99 %

## 2024-01-22 DIAGNOSIS — R73.03 PRE-DIABETES: ICD-10-CM

## 2024-01-22 DIAGNOSIS — I10 ESSENTIAL HYPERTENSION: ICD-10-CM

## 2024-01-22 DIAGNOSIS — E78.5 HYPERLIPIDEMIA, UNSPECIFIED HYPERLIPIDEMIA TYPE: ICD-10-CM

## 2024-01-22 DIAGNOSIS — R42 DIZZINESS: Primary | ICD-10-CM

## 2024-01-22 DIAGNOSIS — E55.9 VITAMIN D DEFICIENCY: ICD-10-CM

## 2024-01-22 LAB
ATRIAL RATE: 62 BPM
P AXIS: 32 DEGREES
PR INTERVAL: 176 MS
QRS AXIS: 23 DEGREES
QRSD INTERVAL: 68 MS
QT INTERVAL: 404 MS
QTC INTERVAL: 410 MS
T WAVE AXIS: 9 DEGREES
VENTRICULAR RATE: 62 BPM

## 2024-01-22 PROCEDURE — 99214 OFFICE O/P EST MOD 30 MIN: CPT | Performed by: FAMILY MEDICINE

## 2024-01-22 RX ORDER — CHOLECALCIFEROL (VITAMIN D3) 1250 MCG
50000 CAPSULE ORAL WEEKLY
Qty: 12 CAPSULE | Refills: 1 | Status: SHIPPED | OUTPATIENT
Start: 2024-01-22 | End: 2024-07-02

## 2024-01-22 NOTE — ASSESSMENT & PLAN NOTE
Lightheadedness and dizziness, few episodes  Does not hydrate well with water  Drinks tea at most in terms of food intake    Previously on carvedilol 25 twice daily, nifedipine 60 twice daily-taken differently, only nifedipine once a day, stopped even taking that for the past 2 days.    Blood pressure today 122/80  Advised improving water hydration with a goal of reaching 64 ounces over weeks  Discontinue carvedilol  Restart nifedipine 60 mg once a day, instead of previously 2 times a day.  Follow-up with blood work  Return in 4 to 5 weeks.

## 2024-01-22 NOTE — LETTER
January 22, 2024     Patient: Fartun Hurley  YOB: 1965  Date of Visit: 1/22/2024      To Whom it May Concern:    Fartun uHrley is under my professional care. Fartun was seen in my office on 1/22/2024. Fartun may return to work on 01/23/2024 .    If you have any questions or concerns, please don't hesitate to call.         Sincerely,          Andres Hicks MD        CC: No Recipients

## 2024-01-22 NOTE — PROGRESS NOTES
[facility] PRIMARY [facility]  [address] [place]    Name: Fartun Hurley      YOB: 1965      MRN: [id number]  Encounter Provider: Andres Hicks MD      Encounter Date: 01/22/24      Encounter Dept: [facility]    Assessment & Plan     1. Dizziness  Assessment & Plan:  Refer to hypertension problem list.      2. Essential hypertension  Assessment & Plan:  Lightheadedness and dizziness, few episodes  Does not hydrate well with water  Drinks tea at most in terms of food intake    Previously on carvedilol 25 twice daily, nifedipine 60 twice daily-taken differently, only nifedipine once a day, stopped even taking that for the past 2 days.    Blood pressure today 122/80  Advised improving water hydration with a goal of reaching 64 ounces over weeks  Discontinue carvedilol  Restart nifedipine 60 mg once a day, instead of previously 2 times a day.  Follow-up with blood work  Return in 4 to 5 weeks.    Orders:  -     NIFEdipine ER (ADALAT CC) 60 MG 24 hr tablet; Take 1 tablet (60 mg total) by mouth daily  -     Comprehensive metabolic panel; Future; Expected date: 01/22/2024  -     CBC and differential; Future; Expected date: 01/22/2024  -     TSH, 3rd generation with Free T4 reflex; Future; Expected date: 01/22/2024    3. Vitamin D deficiency  -     Cholecalciferol (Vitamin D3) 1.25 MG (14530 UT) CAPS; Take 1 capsule (50,000 Units total) by mouth once a week for 24 doses  -     Vitamin D 25 hydroxy; Future; Expected date: 07/22/2024    4. Pre-diabetes  -     Hemoglobin A1C; Future; Expected date: 01/22/2024    5. Hyperlipidemia, unspecified hyperlipidemia type  -     Lipid Panel with Direct LDL reflex; Future; Expected date: 01/22/2024             Pertinent labs were evaluated and discussed with patient today.       Follow-Up Plans   Return in about 5 weeks (around  "2/26/2024).            _______________________________________________________________________  Reason For Visit    Physical Exam      Subjective   Fartun Hurley is a 58 y.o. with  has a past medical history of High blood pressure, High cholesterol, and Pre-diabetes.      Today patient reports about dizziness and lightheadedness..       Lightheadedness, dizziness, nausea. Sometimes.   4 PM yesterday. BP around 190/90s. It came down.   Water hydration, minimal.           Review of Systems   Constitutional:  Negative for chills, fatigue and fever.   Respiratory:  Negative for chest tightness and shortness of breath.    Cardiovascular:  Negative for chest pain.   Neurological:  Positive for dizziness and light-headedness.         Current Medication List     Current Outpatient Medications:     Cholecalciferol (Vitamin D3) 1.25 MG (72088 UT) CAPS, Take 1 capsule (50,000 Units total) by mouth once a week for 24 doses, Disp: 12 capsule, Rfl: 1    NIFEdipine ER (ADALAT CC) 60 MG 24 hr tablet, Take 1 tablet (60 mg total) by mouth daily, Disp: , Rfl:   No current facility-administered medications for this visit.      Objective     /80 (BP Location: Left arm, Patient Position: Sitting, Cuff Size: Large)   Pulse 56   Temp 97.5 °F (36.4 °C) (Tympanic)   Resp 16   Ht 5' 2\" (1.575 m)   Wt 77.7 kg (171 lb 6.4 oz)   SpO2 99%   BMI 31.35 kg/m²      Physical Exam  Vitals reviewed.   Constitutional:       General: She is not in acute distress.     Appearance: Normal appearance. She is not ill-appearing, toxic-appearing or diaphoretic.   HENT:      Head: Normocephalic and atraumatic.      Right Ear: External ear normal.      Left Ear: External ear normal.      Nose: Nose normal.      Mouth/Throat:      Mouth: Mucous membranes are moist.   Eyes:      General: No scleral icterus.        Right eye: No discharge.         Left eye: No discharge.      Conjunctiva/sclera: Conjunctivae normal.   Cardiovascular:      Rate " and Rhythm: Normal rate.   Pulmonary:      Effort: Pulmonary effort is normal. No respiratory distress.   Skin:     General: Skin is warm.   Neurological:      General: No focal deficit present.      Mental Status: She is alert and oriented to person, place, and time.   Psychiatric:         Mood and Affect: Mood normal.         Behavior: Behavior normal.         Thought Content: Thought content normal.           Andres Hicks MD  Family Medicine Physician   [facility] PRIMARY CARE [place]

## 2024-02-20 ENCOUNTER — RA CDI HCC (OUTPATIENT)
Dept: OTHER | Facility: HOSPITAL | Age: 59
End: 2024-02-20

## 2024-02-20 NOTE — PROGRESS NOTES
HCC coding opportunities       Chart reviewed, no opportunity found: CHART REVIEWED, NO OPPORTUNITY FOUND        Patients Insurance        Commercial Insurance: [employer]

## 2024-02-26 ENCOUNTER — APPOINTMENT (OUTPATIENT)
Age: 59
End: 2024-02-26
Payer: COMMERCIAL

## 2024-02-26 DIAGNOSIS — R73.03 PRE-DIABETES: ICD-10-CM

## 2024-02-26 DIAGNOSIS — I10 ESSENTIAL HYPERTENSION: ICD-10-CM

## 2024-02-26 DIAGNOSIS — E78.5 HYPERLIPIDEMIA, UNSPECIFIED HYPERLIPIDEMIA TYPE: ICD-10-CM

## 2024-02-26 LAB
ALBUMIN SERPL BCP-MCNC: 4.4 G/DL (ref 3.5–5)
ALP SERPL-CCNC: 72 U/L (ref 34–104)
ALT SERPL W P-5'-P-CCNC: 15 U/L (ref 7–52)
ANION GAP SERPL CALCULATED.3IONS-SCNC: 10 MMOL/L
AST SERPL W P-5'-P-CCNC: 16 U/L (ref 13–39)
BASOPHILS # BLD AUTO: 0.02 THOUSANDS/ÂΜL (ref 0–0.1)
BASOPHILS NFR BLD AUTO: 1 % (ref 0–1)
BILIRUB SERPL-MCNC: 0.41 MG/DL (ref 0.2–1)
BUN SERPL-MCNC: 16 MG/DL (ref 5–25)
CALCIUM SERPL-MCNC: 9.9 MG/DL (ref 8.4–10.2)
CHLORIDE SERPL-SCNC: 100 MMOL/L (ref 96–108)
CHOLEST SERPL-MCNC: 190 MG/DL
CO2 SERPL-SCNC: 32 MMOL/L (ref 21–32)
CREAT SERPL-MCNC: 1.25 MG/DL (ref 0.6–1.3)
EOSINOPHIL # BLD AUTO: 0.02 THOUSAND/ÂΜL (ref 0–0.61)
EOSINOPHIL NFR BLD AUTO: 1 % (ref 0–6)
ERYTHROCYTE [DISTWIDTH] IN BLOOD BY AUTOMATED COUNT: 12.8 % (ref 11.6–15.1)
EST. AVERAGE GLUCOSE BLD GHB EST-MCNC: 160 MG/DL
GFR SERPL CREATININE-BSD FRML MDRD: 46 ML/MIN/1.73SQ M
GLUCOSE P FAST SERPL-MCNC: 112 MG/DL (ref 65–99)
HBA1C MFR BLD: 7.2 %
HCT VFR BLD AUTO: 44.4 % (ref 34.8–46.1)
HDLC SERPL-MCNC: 47 MG/DL
HGB BLD-MCNC: 14.9 G/DL (ref 11.5–15.4)
IMM GRANULOCYTES # BLD AUTO: 0 THOUSAND/UL (ref 0–0.2)
IMM GRANULOCYTES NFR BLD AUTO: 0 % (ref 0–2)
LDLC SERPL CALC-MCNC: 120 MG/DL (ref 0–100)
LYMPHOCYTES # BLD AUTO: 2.53 THOUSANDS/ÂΜL (ref 0.6–4.47)
LYMPHOCYTES NFR BLD AUTO: 63 % (ref 14–44)
MCH RBC QN AUTO: 27.4 PG (ref 26.8–34.3)
MCHC RBC AUTO-ENTMCNC: 33.6 G/DL (ref 31.4–37.4)
MCV RBC AUTO: 82 FL (ref 82–98)
MONOCYTES # BLD AUTO: 0.53 THOUSAND/ÂΜL (ref 0.17–1.22)
MONOCYTES NFR BLD AUTO: 14 % (ref 4–12)
NEUTROPHILS # BLD AUTO: 0.82 THOUSANDS/ÂΜL (ref 1.85–7.62)
NEUTS SEG NFR BLD AUTO: 21 % (ref 43–75)
NRBC BLD AUTO-RTO: 0 /100 WBCS
PLATELET # BLD AUTO: 362 THOUSANDS/UL (ref 149–390)
PMV BLD AUTO: 10.7 FL (ref 8.9–12.7)
POTASSIUM SERPL-SCNC: 3.6 MMOL/L (ref 3.5–5.3)
PROT SERPL-MCNC: 7 G/DL (ref 6.4–8.4)
RBC # BLD AUTO: 5.43 MILLION/UL (ref 3.81–5.12)
SODIUM SERPL-SCNC: 142 MMOL/L (ref 135–147)
TRIGL SERPL-MCNC: 115 MG/DL
TSH SERPL DL<=0.05 MIU/L-ACNC: 1.26 UIU/ML (ref 0.45–4.5)
WBC # BLD AUTO: 3.92 THOUSAND/UL (ref 4.31–10.16)

## 2024-02-26 PROCEDURE — 84443 ASSAY THYROID STIM HORMONE: CPT

## 2024-02-26 PROCEDURE — 80053 COMPREHEN METABOLIC PANEL: CPT

## 2024-02-26 PROCEDURE — 80061 LIPID PANEL: CPT

## 2024-02-26 PROCEDURE — 85025 COMPLETE CBC W/AUTO DIFF WBC: CPT

## 2024-02-26 PROCEDURE — 83036 HEMOGLOBIN GLYCOSYLATED A1C: CPT

## 2024-02-26 PROCEDURE — 36415 COLL VENOUS BLD VENIPUNCTURE: CPT

## 2024-02-27 ENCOUNTER — OFFICE VISIT (OUTPATIENT)
Age: 59
End: 2024-02-27
Payer: COMMERCIAL

## 2024-02-27 VITALS
HEIGHT: 62 IN | BODY MASS INDEX: 30.88 KG/M2 | TEMPERATURE: 97.7 F | HEART RATE: 76 BPM | OXYGEN SATURATION: 98 % | SYSTOLIC BLOOD PRESSURE: 119 MMHG | DIASTOLIC BLOOD PRESSURE: 88 MMHG | WEIGHT: 167.8 LBS | RESPIRATION RATE: 16 BRPM

## 2024-02-27 DIAGNOSIS — N18.31 STAGE 3A CHRONIC KIDNEY DISEASE (CKD) (HCC): Primary | ICD-10-CM

## 2024-02-27 DIAGNOSIS — E11.22 TYPE 2 DIABETES MELLITUS WITH CHRONIC KIDNEY DISEASE, WITHOUT LONG-TERM CURRENT USE OF INSULIN, UNSPECIFIED CKD STAGE (HCC): ICD-10-CM

## 2024-02-27 DIAGNOSIS — I10 ESSENTIAL HYPERTENSION: ICD-10-CM

## 2024-02-27 DIAGNOSIS — E78.2 MIXED HYPERLIPIDEMIA: ICD-10-CM

## 2024-02-27 DIAGNOSIS — R79.89 ABNORMAL CBC: ICD-10-CM

## 2024-02-27 PROCEDURE — 99214 OFFICE O/P EST MOD 30 MIN: CPT | Performed by: FAMILY MEDICINE

## 2024-02-27 NOTE — ASSESSMENT & PLAN NOTE
Discussed recent labs including prediabetic range A1c.   Discussed the importance of making dietary and lifestyle changes to avoid progression and the need of multiple antidiabetic meds    Recommend low-carb diet, limiting snacking, intermittent fasting as discussed  Recommend exercising 200-300 minutes/week, 60 minutes sessions x 4 days  Will monitor A1c in 3-6 months increments  Follow-up with labs as discussed  Return in

## 2024-02-27 NOTE — ASSESSMENT & PLAN NOTE
Lightheadedness and dizziness, few episodes  Does not hydrate well with water  Drinks tea at most in terms of food intake    Previously on carvedilol 25 twice daily, nifedipine 60 twice daily-taken differently, only nifedipine once a day, stopped even taking that for the past 2 days.    Blood Pressure: 119/88     Advised improving water hydration with a goal of reaching 64 ounces over weeks  Continue nifedipine 60 mg once a day  Low-sodium diet as discussed  Better control of diabetes as discussed  Return in 3 months

## 2024-02-27 NOTE — ASSESSMENT & PLAN NOTE
Lab Results   Component Value Date    HGBA1C 7.2 (H) 02/26/2024     Uncontrolled, not on antidiabetic meds.  Patient has been prediabetic/diabetic on and off for several years now.  Would like to continue to manage with diet.    Discussed its effect on the kidneys.  Follows nephrologist with CKD stage III a for several years now    Recommended low-carb, low-sodium diet limit snacking consider intermittent fasting, printout provided.  Follow-up with A1c in 3 months  Return in 3 months

## 2024-02-27 NOTE — ASSESSMENT & PLAN NOTE
Lab Results   Component Value Date    CHOLESTEROL 190 02/26/2024    CHOLESTEROL 180 07/10/2023    CHOLESTEROL 225 (H) 03/18/2022     Lab Results   Component Value Date    HDL 47 (L) 02/26/2024    HDL 59 07/10/2023    HDL 68 03/18/2022     Lab Results   Component Value Date    TRIG 115 02/26/2024    TRIG 89 07/10/2023    TRIG 127 03/18/2022     Lab Results   Component Value Date    LDLCALC 120 (H) 02/26/2024       Currently not on statin.   Reviewed lipid panel   Triglycerides/HDL ratio: 2.45. Goal <2, 02/26/2024   Discussed importance of nutritional intake, exercising regularly.    Start rosuvastatin 10 mg daily   Decrease calorie intake as discussed  Recommend low-carb diet  Exercise regularly as discussed  In addition to diet supplement omega-3 with fish oil as discussed.  Monitor lipid panel in 6-12 months.

## 2024-02-27 NOTE — ASSESSMENT & PLAN NOTE
Lab Results   Component Value Date    EGFR 46 02/26/2024    EGFR 39 01/21/2024    EGFR 51 07/10/2023    CREATININE 1.25 02/26/2024    CREATININE 1.44 (H) 01/21/2024    CREATININE 1.16 07/10/2023       Improve lifestyle and dietary changes to slow its progression  Labs reviewed  Improve lifestyle and dietary changes to slow its progression  Low-sodium, low-carb diet, limit snacking, exercise regularly.  Avoid nephrotoxic agents including over-the-counter NSAIDs as discussed  Hydrate well with water.

## 2024-02-27 NOTE — PATIENT INSTRUCTIONS
Recommendations for bone and heart health  Take vitamin D3 50,000 units twice a week for 24 weeks, prescription sent to pharmacy   Follow-up with vitamin D blood work after completing the weekly prescription course.  Once completed with the weekly course begin vitamin D3 5,000 units daily with food   Start Vitamin K2 100-200 mcg daily with food    Calcium 1200 mg daily with Vitamin K2   Start fish oil with omega-3.       Why is vitamin K2 important?  Vitamin K 2 helps ensure that the calcium is directed to the bones for proper mineralization, contributing to bone strength and density.  Vitamin K2 also helps to activate proteins that prevent calcium from depositing in the arterial walls and reducing the risk of arterial calcification. Let's get your calcium where it belongs, into the bones and out of the vessels!!     Why is Vitamin D important?  Adequate vitamin D levels reduces the risk of fractures and osteoporosis.  Vitamin D is involved in modulating the immune system, enhancing the body's defense against infections and supporting overall immune function.  Adequate vitamin D levels are associated with better muscle strength and function.  Deficiency may contribute to muscle weakness and an increase risk of falls in older adults.  Multiple research studies have associated low vitamin D with increased risk of autoimmune disease, cancers including breast cancer, and multiple sclerosis.  Some research also link vitamin D deficiency to increase risk of cardiovascular disease. Vitamin D also plays a role in synthesis of various hormones, including insulin.  It may play a role in insulin sensitivity and glucose metabolism. Adequate levels of vitamin D may also plays a role in supporting mental wellbeing.  In conclusion, lets bring your vitamin D levels up to >65 to prevent many diseases and conditions!     Why is fish oil with omega-3 important?  Omega-3 fatty acids and fish oil or associated with reduced risk of heart  disease.  They help to improve balance of cholesterol by increasing HDL (good cholesterol) and reducing triglycerides. They also reduce blood pressure and have anti-inflammatory effects contributing to overall cardiovascular health.  Omega-threes have anti-inflammatory properties that may benefit individuals with joint conditions such as rheumatoid arthritis, and other chronic inflammatory conditions such as IBD.  They can help reduce inflammation and alleviate symptoms. Let's prevent diseases, avoid the need of medications and start your healthy journey with these supplements (in addition to improving your nutritional intake and regular exercise)!      Together as a team our goal is to practice preventative care, avoid chronic diseases, and/or avoid further progression of current chronic diseases.   Here are my recommendations as we discussed during our office visit:    Exercise:  150 minutes of moderate intensity workout for overall general health  200-300 minutes of moderate intensity workout for weight loss.   The first 30 minutes of exercising, the body will take energy from what we ate that day. Then after that 30-minute flora, the body will take energy from the stored fats.  Therefore, it will be more beneficial to do longer sessions and less frequently in a week to help with our weight loss journey.  I would recommend 60-minute sessions 4 times a week   Strength training 2 times a week for good bone health    Nutritional intake (diet):  Remember, it is not about finding a diet to lose weight quickly, rather adjusting your lifestyle for healthy living long-term.   When we build good habits, it does not become difficult to maintain it.  Focus on a low-carb diet.  The best diet is Mediterranean diet, which is a low-carb diet.  There are good carbs and bad carbs, minimize the bad carbs.  Bad carbs: Refined carbohydrates or simple carbohydrates that have been processed and stripped of their natural fiber and  nutrients.    These carbohydrates can lead to rapid spikes in blood sugar levels and are often associated with low nutritional value. The big 4: Bread, rice, pasta, potatoes  Refined grains-white bread, white rice, pasta made from refined flour.  Sugary foods and beverages: Candy, pastries, sugary cereals, soda, and other sugary drinks.  Processed snacks: Chips, cookies, sugary granola bars  Sweetened breakfast cereals: Cereals with added sugars.  Sweets and desserts: Cakes, ice cream, pies  Good carbs: These are referred to complex carbohydrates that are unprocessed or minimally processed, providing more nutrients.  Here examples of good carbs:  Whole grains: Brown rice, quinoa, oats, whole-wheat products   Legumes: Lentils, chickpeas, black beans, kidney beans  Starchy vegetables: Sweet potatoes, butternut squash  Whole fruits: Berries, apples, oranges, bananas  Vegetables: Broccoli, spinach, kale, [place] sprouts  Nuts and seeds: Almonds, walnuts, Rony seeds, flaxseeds.  Cooking oil  Avoid the following oil for cooking: Canola, corn, vegetable, sunflower, peanut, sesame, grapeseed, flaxseed.  Even though it states it is heart healthy, however, they are significantly processed and refined.   Would recommend instead the following cooking oil: Olive oil, coconut oil, avocado oil, ghee, butter.  Focus on eating whole foods.  What are whole foods?  Whole Foods refer to natural, unprocessed, or minimally processed foods that are as close to the original form as possible.  These foods are in their natural state and have undergone little to no refined or alteration.  Whole Foods are valued for their nutrient density, providing essential vitamins, minerals, fiber, and other beneficial compounds.  Examples of whole foods include:  Fruits and vegetables without added preservatives or processing.    Whole grains-unrefined grains like brown rice, quinoa, and whole-wheat, which retain their brain, germ, and  endosperm.  Legumes-beans, lentils, and peas in their national unprocessed date.  Nuts and seeds-on roasted, unsalted nuts and seeds without added oils or seasonings.  Meat and fish-fresh, unprocessed meats and fish without additives or preservatives.  Dairy-unprocessed dairy products such as milk, yogurt, and cheese without added sugars or artificial ingredients.  Eggs-eggs in their natural form without additives.    Intermittent fasting:   There are several benefits of intermittent fasting including weight loss, improving insulin sensitivity, improving cardiovascular health by reducing risk factors like blood pressure, cholesterol levels, and triglycerides. It also promotes brain health, longevity, reduction in inflammatory markers, improves metabolic health, and some studies even suggest intermittent fasting to be protective against certain types of cancers.     The goal of intermittent fasting is to shutdown the insulin hormone, as we discussed, which is a hormone that negatively affects our health when it is around in high levels chronically.     Start intermittent fasting for 14 hours initially, then increase to 16 hours, with a goal to reach 18 hours.  During fasting - may drink water without any additives such as sweeteners, black coffee, tea without any additives including milk.   Eat nutritious meals 2 times a day  Avoid snacking in between meals.  If you end up snacking, snack on fruits/vegetables.  Initially it might be difficult, however, over time you will notice a positive change in your energy, mood, and weight.

## 2024-02-27 NOTE — ASSESSMENT & PLAN NOTE
Reviewed & Discussed labs  Deficient vitamin D levels  Goal , preventative therapy     Start vitamin D3 50,000 units   Follow-up with vitamin D level after completion of course   After weekly prescription dose, start vitamin D3 5,000 units daily supplements from over-the-counter   Advised vitamin K2 100-200 mcg daily, preventative measures for vascular calcification.

## 2024-02-27 NOTE — PROGRESS NOTES
[facility] PRIMARY [facility]  [address] [place]    Name: Fartun Hurley      YOB: 1965      MRN: [id number]  Encounter Provider: Andres Hicks MD      Encounter Date: 02/27/24      Encounter Dept: [facility]    Assessment & Plan     1. Stage 3a chronic kidney disease (CKD) (HCC)  Assessment & Plan:  Lab Results   Component Value Date    EGFR 46 02/26/2024    EGFR 39 01/21/2024    EGFR 51 07/10/2023    CREATININE 1.25 02/26/2024    CREATININE 1.44 (H) 01/21/2024    CREATININE 1.16 07/10/2023       Improve lifestyle and dietary changes to slow its progression  Labs reviewed  Improve lifestyle and dietary changes to slow its progression  Low-sodium, low-carb diet, limit snacking, exercise regularly.  Avoid nephrotoxic agents including over-the-counter NSAIDs as discussed  Hydrate well with water.      Orders:  -     Ambulatory Referral to Nephrology; Future    2. Mixed hyperlipidemia  Assessment & Plan:  Lab Results   Component Value Date    CHOLESTEROL 190 02/26/2024    CHOLESTEROL 180 07/10/2023    CHOLESTEROL 225 (H) 03/18/2022     Lab Results   Component Value Date    HDL 47 (L) 02/26/2024    HDL 59 07/10/2023    HDL 68 03/18/2022     Lab Results   Component Value Date    TRIG 115 02/26/2024    TRIG 89 07/10/2023    TRIG 127 03/18/2022     Lab Results   Component Value Date    LDLCALC 120 (H) 02/26/2024       Currently not on statin.   Reviewed lipid panel   Triglycerides/HDL ratio: 2.45. Goal <2, 02/26/2024   Discussed importance of nutritional intake, exercising regularly.    Start rosuvastatin 10 mg daily   Decrease calorie intake as discussed  Recommend low-carb diet  Exercise regularly as discussed  In addition to diet supplement omega-3 with fish oil as discussed.  Monitor lipid panel in 6-12 months.        3. Essential hypertension  Assessment & Plan:  Lightheadedness and dizziness, few  episodes  Does not hydrate well with water  Drinks tea at most in terms of food intake    Previously on carvedilol 25 twice daily, nifedipine 60 twice daily-taken differently, only nifedipine once a day, stopped even taking that for the past 2 days.    Blood Pressure: 119/88     Advised improving water hydration with a goal of reaching 64 ounces over weeks  Continue nifedipine 60 mg once a day  Low-sodium diet as discussed  Better control of diabetes as discussed  Return in 3 months      4. Abnormal CBC  -     Peripheral Smear; Future  -     LD,Blood; Future    5. Type 2 diabetes mellitus with chronic kidney disease, without long-term current use of insulin, unspecified CKD stage ([facility])  Assessment & Plan:    Lab Results   Component Value Date    HGBA1C 7.2 (H) 02/26/2024     Uncontrolled, not on antidiabetic meds.  Patient has been prediabetic/diabetic on and off for several years now.  Would like to continue to manage with diet.    Discussed its effect on the kidneys.  Follows nephrologist with CKD stage III a for several years now    Recommended low-carb, low-sodium diet limit snacking consider intermittent fasting, printout provided.  Follow-up with A1c in 3 months  Return in 3 months                     Follow-Up Plans   Return in about 3 months (around 5/27/2024) for Annual physical.      Subjective   Fartun Hurley is a 58 y.o. with  has a past medical history of High blood pressure, High cholesterol, and Pre-diabetes.        Previously reported Lightheadedness, dizziness, nausea. Sometimes. Water hydration, minimal.          Review of Systems      Current Medication List     Current Outpatient Medications:     Cholecalciferol (Vitamin D3) 1.25 MG (22092 UT) CAPS, Take 1 capsule (50,000 Units total) by mouth once a week for 24 doses, Disp: 12 capsule, Rfl: 1    NIFEdipine ER (ADALAT CC) 60 MG 24 hr tablet, Take 1 tablet (60 mg total) by mouth daily, Disp: , Rfl:       Objective     /88 (BP  "Location: Left arm, Patient Position: Sitting, Cuff Size: Standard)   Pulse 76   Temp 97.7 °F (36.5 °C) (Tympanic)   Resp 16   Ht 5' 2\" (1.575 m)   Wt 76.1 kg (167 lb 12.8 oz)   SpO2 98%   BMI 30.69 kg/m²      Physical Exam  Vitals reviewed.   Constitutional:       General: She is not in acute distress.     Appearance: Normal appearance. She is not ill-appearing, toxic-appearing or diaphoretic.   HENT:      Head: Normocephalic and atraumatic.      Right Ear: External ear normal.      Left Ear: External ear normal.      Nose: Nose normal.      Mouth/Throat:      Mouth: Mucous membranes are moist.   Eyes:      General: No scleral icterus.        Right eye: No discharge.         Left eye: No discharge.      Conjunctiva/sclera: Conjunctivae normal.   Cardiovascular:      Rate and Rhythm: Normal rate.   Pulmonary:      Effort: Pulmonary effort is normal. No respiratory distress.   Skin:     General: Skin is warm.   Neurological:      General: No focal deficit present.      Mental Status: She is alert and oriented to person, place, and time.   Psychiatric:         Mood and Affect: Mood normal.         Behavior: Behavior normal.         Thought Content: Thought content normal.           Andres Hicks MD  Family Medicine Physician   [facility]IMARY C[facility] [place]    "

## 2024-02-28 ENCOUNTER — TELEPHONE (OUTPATIENT)
Dept: NEPHROLOGY | Facility: CLINIC | Age: 59
End: 2024-02-28

## 2024-02-28 NOTE — TELEPHONE ENCOUNTER
I called and left a message on machine for patient to return our call about scheduling her Nephrology Consult appointment ref by Dr. Yessi Hicks for CKD 3.

## 2024-02-29 ENCOUNTER — TELEPHONE (OUTPATIENT)
Dept: NEPHROLOGY | Facility: CLINIC | Age: 59
End: 2024-02-29

## 2024-02-29 NOTE — TELEPHONE ENCOUNTER
I called and left a message on machine for patient to return our call about scheduling a Nephrology Consult appointment ref by Dr. Andres Hicks for CKD 3. A letter was mailed out to the patient.

## 2024-03-08 ENCOUNTER — APPOINTMENT (OUTPATIENT)
Age: 59
End: 2024-03-08
Payer: COMMERCIAL

## 2024-03-08 DIAGNOSIS — E55.9 VITAMIN D DEFICIENCY: ICD-10-CM

## 2024-03-08 DIAGNOSIS — R79.89 ABNORMAL CBC: ICD-10-CM

## 2024-03-08 LAB
ANISOCYTOSIS BLD QL SMEAR: PRESENT
BASOPHILS NFR BLD MANUAL: 1 % (ref 0–1)
IMM EOSINOPHIL NFR BLD MANUAL: 1 % (ref 0–6)
LDH SERPL-CCNC: 141 U/L (ref 140–271)
LYMPHOCYTES NFR BLD: 58 % (ref 14–44)
MONOCYTES NFR BLD AUTO: 5 % (ref 4–12)
NEUTS BAND NFR BLD MANUAL: 1 THOUSAND/UL
NEUTS SEG NFR BLD AUTO: 30 % (ref 45–77)
OVALOCYTES BLD QL SMEAR: PRESENT
POIKILOCYTOSIS BLD QL SMEAR: PRESENT
RBC MORPH BLD: PRESENT
TOTAL CELLS COUNTED SPEC: 100
VARIANT LYMPHS BLD QL SMEAR: 4 % (ref 0–0)

## 2024-03-08 PROCEDURE — 83615 LACTATE (LD) (LDH) ENZYME: CPT

## 2024-03-08 PROCEDURE — 85007 BL SMEAR W/DIFF WBC COUNT: CPT

## 2024-03-08 PROCEDURE — 36415 COLL VENOUS BLD VENIPUNCTURE: CPT

## 2024-03-11 ENCOUNTER — TELEPHONE (OUTPATIENT)
Age: 59
End: 2024-03-11

## 2024-03-11 DIAGNOSIS — D72.820 LYMPHOCYTOSIS: Primary | ICD-10-CM

## 2024-03-11 NOTE — TELEPHONE ENCOUNTER
Refer to hematology for further evaluation management of chronic lymphocytosis and abnormal peripheral smear.    Provided patient with phone number to [place] hematology office through [employer].

## 2024-03-13 ENCOUNTER — TELEPHONE (OUTPATIENT)
Dept: HEMATOLOGY ONCOLOGY | Facility: CLINIC | Age: 59
End: 2024-03-13

## 2024-03-13 NOTE — TELEPHONE ENCOUNTER
I called Fartun in response to a referral that was received for patient to establish care with Hematology.     Outreach was made to schedule a consultation.    I left a voicemail explaining the reason for my call and advised patient to call \[facility]\"" at [phone].  Another attempt will be made to contact patient.

## 2024-04-26 ENCOUNTER — TELEPHONE (OUTPATIENT)
Dept: NEPHROLOGY | Facility: CLINIC | Age: 59
End: 2024-04-26

## 2024-04-26 DIAGNOSIS — I10 ESSENTIAL HYPERTENSION: ICD-10-CM

## 2024-04-26 NOTE — TELEPHONE ENCOUNTER
I called [employer] @ [phone] and spoke to Reginald HERNANDEZ (Female ) to check eligible for the patient and as of 4/26/2024 this patient has current active coverage as of 1/10/2022. According to Reginald HERNANDEZ (Female ) patient needs to call [employer] to do her Coordination of Benefits, because the don't know that she has a Secondary insurance. The reference number for this call is: [id number].  Nieves Monreal, .     I called [employer] @ [phone] and spoke to Darlene ROSALES () to check eligible for the patient and as of 4/26/2024 this patient has current active coverage as of 7/1/2023 but will be terminating on 6/30/2024. Also according to Darlene ROSALES () [facility] is Non-Par and Out of Network with this patient plan. The patient also has to call [employer] and do her Coordination of Benefits, because [place] [employer] does not know that she has [employer] and that they are Primary. Nieves Monreal, .     I did call the patient and explained to the patient the she needs to call [employer] and [employer], because they both don't know about each other, they both think they are both primary and I also explained that the  from [place] [employer] stated that her plan was going to be terminating on 6/30/2024. I also explained to the patient that according to Darlene ROSALES () that [facility] Non[facility] and Out of Network with her [employer]. The patient stated that she was going to give both insurance companies a call. The patient understood and was okay with it.

## 2024-04-30 ENCOUNTER — TELEPHONE (OUTPATIENT)
Dept: NEPHROLOGY | Facility: CLINIC | Age: 59
End: 2024-04-30

## 2024-04-30 DIAGNOSIS — N18.31 STAGE 3A CHRONIC KIDNEY DISEASE (CKD) (HCC): Primary | ICD-10-CM

## 2024-04-30 PROCEDURE — 3066F NEPHROPATHY DOC TX: CPT | Performed by: PHYSICIAN ASSISTANT

## 2024-04-30 NOTE — TELEPHONE ENCOUNTER
Called spoke with patient advised patient to get non-fasting labs done 1 week prior to 05/09/24 consult appointment with Dr.Adeem Margarito MD. patient verbalized understanding and is okay with it.

## 2024-05-02 ENCOUNTER — OFFICE VISIT (OUTPATIENT)
Dept: HEMATOLOGY ONCOLOGY | Facility: CLINIC | Age: 59
End: 2024-05-02
Payer: COMMERCIAL

## 2024-05-02 VITALS
TEMPERATURE: 97.7 F | DIASTOLIC BLOOD PRESSURE: 80 MMHG | BODY MASS INDEX: 30.36 KG/M2 | RESPIRATION RATE: 16 BRPM | HEIGHT: 62 IN | SYSTOLIC BLOOD PRESSURE: 124 MMHG | WEIGHT: 165 LBS | HEART RATE: 78 BPM | OXYGEN SATURATION: 99 %

## 2024-05-02 DIAGNOSIS — D72.820 LYMPHOCYTOSIS: ICD-10-CM

## 2024-05-02 DIAGNOSIS — D70.9 NEUTROPENIA, UNSPECIFIED TYPE (HCC): Primary | ICD-10-CM

## 2024-05-02 PROCEDURE — 3079F DIAST BP 80-89 MM HG: CPT | Performed by: PHYSICIAN ASSISTANT

## 2024-05-02 PROCEDURE — 3074F SYST BP LT 130 MM HG: CPT | Performed by: PHYSICIAN ASSISTANT

## 2024-05-02 PROCEDURE — 99204 OFFICE O/P NEW MOD 45 MIN: CPT | Performed by: PHYSICIAN ASSISTANT

## 2024-05-02 NOTE — PROGRESS NOTES
[facility] HEMATOLOGY ONCOLOGY SPECIALISTS [address] [address]  Hematology Ambulatory Consult  Fartun Hurley, 1965, [id number]  5/2/2024      Assessment and Plan   1. Lymphocytosis  2. Neutropenia, unspecified type (HCC)  - CMV IgG/IgM Antibodies; Future  - Sarah Barr Virus Antibody Panel; Future  - CBC and differential; Future  - Leukemia/Lymphoma flow cytometry; Future  - BCR/ABL FISH; Future   - Chronic Hepatitis Panel; Future  - Lyme Total AB W Reflex to IGM/IGG; Future  - Methylmalonic acid, serum; Future  - C-reactive protein; Future  - Copper Level; Future  - Vitamin B12; Future  - US abdomen complete; Future    58 year old female who presents in consultation today for evaluation of lymphocytosis. Her most recent CBC from 02/2024 showed mild leukopenia with WBC 3.92.  White blood cell differential showed neutropenia with ANC 0.82, relative lymphocytosis with lymphocyte 63%, ALC 2.53, and monocytosis 14%.  Her hemoglobin and platelet count were normal.  Peripheral smear identified 4% atypical lymphocytes.  LD normal.  She denies any constitutional symptoms or lymphadenopathy.    Discussion/plan  Labs demonstrate relative lymphocytosis with normal absolute lymphocyte count. Unclear etiology. May be due to hematologic malignancy vs chronic viral infections vs lyme disease, etc.   Neutropenia may be secondary to benign ethnic leukocytopenia given duration of neutropenia. Workup for lymphocytosis hopefully will also help to determine underlying etiology of neutropenia.  Additionally recommend abdominal ultrasound, rule out splenomegaly.  RTC 1 month  Patient advised to notify our office immediately if she develops any fevers, night sweats or lymphadenopathy in the interim.     Patient voiced agreement and understanding to the above.   Patient knows to call the Hematology/Oncology office with any questions and concerns regarding the  above.    Barrier(s) to care: None.    Yue Mccormack PA-C  Medical Oncology/Hematology  [facility]    Subjective   No chief complaint on file.      Referring provider    Andres Hicks MD  [address] [place] [address] [place] [id number]    History of present illness: In summary patient is a 58-year-old female with past medical history of HTN, type 2 diabetes, CKD stage III, HLD who has been referred by her PCP for evaluation of lymphocytosis.    On chart review, patient has had a relative lymphocytosis, leukopenia and nuetropenia since at least 2018 with lymphocytes averaging around 49-63%.  Her highest absolute lymphocyte count was 2.8.  Her most recent CBC from 02/2024 showed mild leukopenia with WBC 3.92.  White blood cell differential showed neutropenia with ANC 0.82, relative lymphocytosis with lymphocyte 63%, ALC 2.53, and monocytosis 14%.  Her hemoglobin and platelet count were normal.    Patient endorses some mild weight loss (unable to quantify) a couple months ago however has regained that weight back.  She denies unexplained fevers, drenching night sweats, significant fatigue or lymphadenopathy.  No history of chronic infections such as HIV, hepatitis or any known autoimmune disease. She had HIV testing in 2020, reports monogamous relationship. She has no personal history of malignancy.  Does not have malabsorption disorder or history of bariatric surgery.  No tick bites.  Patient admits to social alcohol use.  Denies tobacco or drug use. Works as a  for [employer].  Great maternal aunt has history of breast cancer, no other family history of cancer.  She is up-to-date on her mammogram.  Last colonoscopy in 2014 was reportedly normal.  Cologuard normal in 2019. She travels out of the country often    She was recently diagnosed with CKD stage III which is possibly due to her hypertension/diabetes.  Has follow-up with nephrology soon.  .  "  05/02/24: as below     Review of Systems   Constitutional:  Positive for fatigue (mild, not limiting ADLs). Negative for fever and unexpected weight change (she had some small unintnetional weight loss a couple months ago, now gained weight back).        No drenching night sweats   HENT:  Negative for nosebleeds.         No gingival bleeding     Eyes:  Positive for visual disturbance (\"glitiching\" in vision intermittently, possibly monthly? occurs when waking up).   Respiratory:  Negative for shortness of breath.    Cardiovascular:  Negative for chest pain.   Gastrointestinal:  Negative for abdominal pain, blood in stool, nausea and vomiting.        No melena   Genitourinary:  Negative for hematuria and vaginal bleeding.   Musculoskeletal:  Negative for arthralgias (knees b/l).   Skin:  Negative for rash.   Neurological:  Negative for headaches.   Hematological:  Negative for adenopathy. Does not bruise/bleed easily.   All other systems reviewed and are negative.      Past Medical History:   Diagnosis Date    High blood pressure     High cholesterol     Pre-diabetes      Past Surgical History:   Procedure Laterality Date    BREAST CYST EXCISION Left 01/01/1980    neg     Family History   Problem Relation Age of Onset    Diabetes Mother     Heart disease Mother     Alzheimer's disease Father     Hypertension Father     No Known Problems Sister     No Known Problems Sister     No Known Problems Sister     No Known Problems Sister     No Known Problems Daughter     No Known Problems Daughter     No Known Problems Maternal Grandmother     No Known Problems Paternal Grandmother     No Known Problems Maternal Aunt     No Known Problems Maternal Aunt     Breast cancer Maternal Aunt     Breast cancer Maternal Aunt     No Known Problems Paternal Aunt     Colon cancer Neg Hx     Ovarian cancer Neg Hx     Uterine cancer Neg Hx     Cervical cancer Neg Hx      Social History     Socioeconomic History    Marital status: " "/Civil Union     Spouse name: None    Number of children: None    Years of education: None    Highest education level: None   Occupational History    None   Tobacco Use    Smoking status: Never    Smokeless tobacco: Never   Vaping Use    Vaping status: Never Used   Substance and Sexual Activity    Alcohol use: Yes    Drug use: No    Sexual activity: Yes     Partners: Male     Birth control/protection: Post-menopausal   Other Topics Concern    None   Social History Narrative    None     Social Determinants of Health     Financial Resource Strain: Not on file   Food Insecurity: Not on file   Transportation Needs: Not on file   Physical Activity: Inactive (1/22/2024)    Exercise Vital Sign     Days of Exercise per Week: 0 days     Minutes of Exercise per Session: 0 min   Stress: No Stress Concern Present (3/18/2022)    [employer] - Occupational Stress Questionnaire     Feeling of Stress : Not at all   Social Connections: Not on file   Intimate Partner Violence: Not on file   Housing Stability: Not on file         Current Outpatient Medications:     Cholecalciferol (Vitamin D3) 1.25 MG (33090 UT) CAPS, Take 1 capsule (50,000 Units total) by mouth once a week for 24 doses, Disp: 12 capsule, Rfl: 1    NIFEdipine ER (ADALAT CC) 60 MG 24 hr tablet, Take 1 tablet (60 mg total) by mouth daily, Disp: 90 tablet, Rfl: 1  No Known Allergies    Objective   /80 (BP Location: Left arm, Patient Position: Sitting, Cuff Size: Adult)   Pulse 78   Temp 97.7 °F (36.5 °C) (Temporal)   Resp 16   Ht 5' 2\" (1.575 m)   Wt 74.8 kg (165 lb)   SpO2 99%   BMI 30.18 kg/m²   Physical Exam  Vitals reviewed.   HENT:      Head: Normocephalic.   Cardiovascular:      Rate and Rhythm: Normal rate and regular rhythm.   Pulmonary:      Effort: Pulmonary effort is normal.      Breath sounds: Normal breath sounds.   Abdominal:      Palpations: Abdomen is soft. There is no hepatomegaly or splenomegaly.      " Tenderness: There is no abdominal tenderness.   Musculoskeletal:      Cervical back: Neck supple.   Lymphadenopathy:      Cervical: No cervical adenopathy.      Upper Body:      Right upper body: No supraclavicular or axillary adenopathy.      Left upper body: No supraclavicular or axillary adenopathy.   Skin:     Findings: No rash.   Neurological:      Mental Status: She is alert.         Result Review  Labs:  No visits with results within 30 Day(s) from this visit.   Latest known visit with results is:   Appointment on 2024   Component Date Value Ref Range Status    Segmented Neutrophils Manual 2024 30 (L)  45 - 77 % Final    Bands % 2024 1  Thousand/uL Final    Lymphocytes Manual 2024 58 (H)  14 - 44 % Final    Monocytes Manual 2024 5  4 - 12 % Final    Eosinophils Manual 2024 1  0 - 6 % Final    Basophils % 2024 1  0 - 1 % Final    Atypical Lymphocytes % 2024 4 (H)  0 - 0 % Final    Total Counted 2024 100   Final    RBC Morphology 2024 Present   Final    Anisocytosis 2024 Present   Final    Ovalocytes 2024 Present   Final    Poikilocytes 2024 Present   Final    LD 2024 141  140 - 271 U/L Final       [email or web address]  DIAGNOSIS: Encounter for screening mammogram for malignant neoplasm of breast      TECHNIQUE:  Digital screening mammography was performed. Computer Aided Detection (CAD) analyzed all applicable images.   COMPARISONS: Prior breast imaging dated: 2022, 2020, 2019, 2018, 2017, and 2015     RELEVANT HISTORY:   Family Breast Cancer History: History of breast cancer in Maternal Aunt, Maternal Aunt.  Family Medical History: Family medical history includes breast cancer in 2 relatives (maternal aunt, maternal aunt).   Personal History: Hormone history includes birth control. Surgical history includes breast excisional biopsy. No known relevant medical history.     The patient is  scheduled in a reminder system for screening mammography.     8-10% of cancers will be missed on mammography. Management of a palpable abnormality must be based on clinical grounds.  Patients will be notified of their results via letter from our facility. Accredited by American College of Radiology and FDA.     RISK ASSESSMENT:   5 Year Tyrer-Cuzick: 1.36 %  10 Year Tyrer-Cuzick: 2.92 %  Lifetime Tyrer-Cuzick: 7.58 %     TISSUE DENSITY:   There are scattered areas of fibroglandular density.        INDICATION: Fartun Hurley is a 57 y.o. female presenting for screening mammography.     FINDINGS:   Bilateral  There are no suspicious masses, grouped microcalcifications or areas of unexplained architectural distortion. The skin and nipple areolar complex are unremarkable.  Benign-appearing calcifications are noted in each breast.        IMPRESSION:  No mammographic evidence of malignancy.           ASSESSMENT/BI-RADS CATEGORY:  Left: 2 - Benign  Right: 2 - Benign  Overall: 2 - Benign     RECOMMENDATION:       - Routine screening mammogram in 1 year for both breasts.  Please note:  This report has been generated by a voice recognition software system. Therefore there may be syntax, spelling, and/or grammatical errors. Please call if you have any questions.

## 2024-05-06 ENCOUNTER — APPOINTMENT (OUTPATIENT)
Age: 59
End: 2024-05-06
Payer: COMMERCIAL

## 2024-05-06 DIAGNOSIS — D72.820 LYMPHOCYTOSIS: ICD-10-CM

## 2024-05-06 DIAGNOSIS — N18.31 STAGE 3A CHRONIC KIDNEY DISEASE (CKD) (HCC): ICD-10-CM

## 2024-05-06 DIAGNOSIS — D70.9 NEUTROPENIA, UNSPECIFIED TYPE (HCC): ICD-10-CM

## 2024-05-06 LAB
25(OH)D3 SERPL-MCNC: 81.3 NG/ML (ref 30–100)
ALBUMIN SERPL BCP-MCNC: 4.6 G/DL (ref 3.5–5)
ALP SERPL-CCNC: 69 U/L (ref 34–104)
ALT SERPL W P-5'-P-CCNC: 15 U/L (ref 7–52)
ANION GAP SERPL CALCULATED.3IONS-SCNC: 8 MMOL/L (ref 4–13)
AST SERPL W P-5'-P-CCNC: 15 U/L (ref 13–39)
BILIRUB SERPL-MCNC: 0.42 MG/DL (ref 0.2–1)
BUN SERPL-MCNC: 11 MG/DL (ref 5–25)
CALCIUM SERPL-MCNC: 9.7 MG/DL (ref 8.4–10.2)
CHLORIDE SERPL-SCNC: 104 MMOL/L (ref 96–108)
CO2 SERPL-SCNC: 30 MMOL/L (ref 21–32)
CREAT SERPL-MCNC: 0.97 MG/DL (ref 0.6–1.3)
CRP SERPL QL: <1 MG/L
ERYTHROCYTE [DISTWIDTH] IN BLOOD BY AUTOMATED COUNT: 13.2 % (ref 11.6–15.1)
GFR SERPL CREATININE-BSD FRML MDRD: 63 ML/MIN/1.73SQ M
GLUCOSE P FAST SERPL-MCNC: 116 MG/DL (ref 65–99)
HCT VFR BLD AUTO: 44.3 % (ref 34.8–46.1)
HGB BLD-MCNC: 14 G/DL (ref 11.5–15.4)
MCH RBC QN AUTO: 26.9 PG (ref 26.8–34.3)
MCHC RBC AUTO-ENTMCNC: 31.6 G/DL (ref 31.4–37.4)
MCV RBC AUTO: 85 FL (ref 82–98)
NRBC BLD AUTO-RTO: 0 /100 WBCS
PHOSPHATE SERPL-MCNC: 2.9 MG/DL (ref 2.3–4.1)
PLATELET # BLD AUTO: 376 THOUSANDS/UL (ref 149–390)
PMV BLD AUTO: 11.6 FL (ref 8.9–12.7)
POTASSIUM SERPL-SCNC: 3.5 MMOL/L (ref 3.5–5.3)
PROT SERPL-MCNC: 7.7 G/DL (ref 6.4–8.4)
PTH-INTACT SERPL-MCNC: 52.7 PG/ML (ref 12–88)
RBC # BLD AUTO: 5.21 MILLION/UL (ref 3.81–5.12)
SODIUM SERPL-SCNC: 142 MMOL/L (ref 135–147)
VIT B12 SERPL-MCNC: 643 PG/ML (ref 180–914)
WBC # BLD AUTO: 3.68 THOUSAND/UL (ref 4.31–10.16)

## 2024-05-06 PROCEDURE — 86803 HEPATITIS C AB TEST: CPT

## 2024-05-06 PROCEDURE — 82607 VITAMIN B-12: CPT

## 2024-05-06 PROCEDURE — 82306 VITAMIN D 25 HYDROXY: CPT

## 2024-05-06 PROCEDURE — 86663 EPSTEIN-BARR ANTIBODY: CPT

## 2024-05-06 PROCEDURE — 86645 CMV ANTIBODY IGM: CPT

## 2024-05-06 PROCEDURE — 88184 FLOWCYTOMETRY/ TC 1 MARKER: CPT

## 2024-05-06 PROCEDURE — 82525 ASSAY OF COPPER: CPT

## 2024-05-06 PROCEDURE — 86664 EPSTEIN-BARR NUCLEAR ANTIGEN: CPT

## 2024-05-06 PROCEDURE — 84100 ASSAY OF PHOSPHORUS: CPT

## 2024-05-06 PROCEDURE — 83970 ASSAY OF PARATHORMONE: CPT

## 2024-05-06 PROCEDURE — 36415 COLL VENOUS BLD VENIPUNCTURE: CPT

## 2024-05-06 PROCEDURE — 88374 M/PHMTRC ALYS ISHQUANT/SEMIQ: CPT

## 2024-05-06 PROCEDURE — 86140 C-REACTIVE PROTEIN: CPT

## 2024-05-06 PROCEDURE — 88185 FLOWCYTOMETRY/TC ADD-ON: CPT | Performed by: PHYSICIAN ASSISTANT

## 2024-05-06 PROCEDURE — 85007 BL SMEAR W/DIFF WBC COUNT: CPT

## 2024-05-06 PROCEDURE — 86704 HEP B CORE ANTIBODY TOTAL: CPT

## 2024-05-06 PROCEDURE — 86644 CMV ANTIBODY: CPT

## 2024-05-06 PROCEDURE — 86705 HEP B CORE ANTIBODY IGM: CPT

## 2024-05-06 PROCEDURE — 86665 EPSTEIN-BARR CAPSID VCA: CPT

## 2024-05-06 PROCEDURE — 86618 LYME DISEASE ANTIBODY: CPT

## 2024-05-06 PROCEDURE — 87340 HEPATITIS B SURFACE AG IA: CPT

## 2024-05-06 PROCEDURE — 80053 COMPREHEN METABOLIC PANEL: CPT

## 2024-05-06 PROCEDURE — 83918 ORGANIC ACIDS TOTAL QUANT: CPT

## 2024-05-07 LAB
B BURGDOR IGG+IGM SER QL IA: NEGATIVE
BASOPHILS # BLD AUTO: 0.04 THOUSAND/UL (ref 0–0.1)
BASOPHILS NFR MAR MANUAL: 1 % (ref 0–1)
BURR CELLS BLD QL SMEAR: PRESENT
EOSINOPHIL # BLD AUTO: 0.04 THOUSAND/UL (ref 0–0.61)
EOSINOPHIL NFR BLD MANUAL: 1 % (ref 0–6)
HBV CORE AB SER QL: NORMAL
HBV CORE IGM SER QL: NORMAL
HBV SURFACE AG SER QL: NORMAL
HCV AB SER QL: NORMAL
LYMPHOCYTES # BLD AUTO: 2.47 THOUSAND/UL (ref 0.6–4.47)
LYMPHOCYTES # BLD AUTO: 67 %
MONOCYTES # BLD AUTO: 0.29 THOUSAND/UL (ref 0–1.22)
MONOCYTES NFR BLD AUTO: 8 % (ref 4–12)
NEUTS SEG # BLD: 0.85 THOUSAND/UL (ref 1.81–6.82)
NEUTS SEG NFR BLD AUTO: 23 %
OVALOCYTES BLD QL SMEAR: PRESENT
PLATELET BLD QL SMEAR: ADEQUATE
POIKILOCYTOSIS BLD QL SMEAR: PRESENT
RBC MORPH BLD: PRESENT
TOTAL CELLS COUNTED SPEC: 100

## 2024-05-08 ENCOUNTER — APPOINTMENT (OUTPATIENT)
Age: 59
End: 2024-05-08
Payer: COMMERCIAL

## 2024-05-08 LAB
BACTERIA UR QL AUTO: ABNORMAL /HPF
BILIRUB UR QL STRIP: NEGATIVE
CLARITY UR: CLEAR
CMV IGG SERPL IA-ACNC: 9.6 U/ML (ref 0–0.59)
CMV IGM SERPL IA-ACNC: <30 AU/ML (ref 0–29.9)
COLOR UR: COLORLESS
CREAT UR-MCNC: 28.1 MG/DL
EBV NA IGG SER IA-ACNC: 308 U/ML (ref 0–17.9)
EBV VCA IGG SER IA-ACNC: 104 U/ML (ref 0–17.9)
EBV VCA IGM SER IA-ACNC: <36 U/ML (ref 0–35.9)
GLUCOSE UR STRIP-MCNC: NEGATIVE MG/DL
HGB UR QL STRIP.AUTO: NEGATIVE
INTERPRETATION: ABNORMAL
KETONES UR STRIP-MCNC: NEGATIVE MG/DL
LEUKOCYTE ESTERASE UR QL STRIP: ABNORMAL
NITRITE UR QL STRIP: NEGATIVE
NON-SQ EPI CELLS URNS QL MICRO: ABNORMAL /HPF
PH UR STRIP.AUTO: 7 [PH]
PROT UR STRIP-MCNC: NEGATIVE MG/DL
PROT UR-MCNC: <4 MG/DL
RBC #/AREA URNS AUTO: ABNORMAL /HPF
SP GR UR STRIP.AUTO: 1.01 (ref 1–1.03)
UROBILINOGEN UR STRIP-ACNC: <2 MG/DL
WBC #/AREA URNS AUTO: ABNORMAL /HPF

## 2024-05-08 PROCEDURE — 81001 URINALYSIS AUTO W/SCOPE: CPT

## 2024-05-08 PROCEDURE — 82570 ASSAY OF URINE CREATININE: CPT

## 2024-05-08 PROCEDURE — 84156 ASSAY OF PROTEIN URINE: CPT

## 2024-05-09 ENCOUNTER — CONSULT (OUTPATIENT)
Dept: NEPHROLOGY | Facility: CLINIC | Age: 59
End: 2024-05-09
Payer: COMMERCIAL

## 2024-05-09 VITALS
HEIGHT: 62 IN | BODY MASS INDEX: 30.91 KG/M2 | DIASTOLIC BLOOD PRESSURE: 80 MMHG | SYSTOLIC BLOOD PRESSURE: 120 MMHG | WEIGHT: 168 LBS | HEART RATE: 96 BPM | TEMPERATURE: 96.9 F | RESPIRATION RATE: 16 BRPM | OXYGEN SATURATION: 98 %

## 2024-05-09 DIAGNOSIS — N18.31 STAGE 3A CHRONIC KIDNEY DISEASE (CKD) (HCC): ICD-10-CM

## 2024-05-09 LAB
COPPER SERPL-MCNC: 98 UG/DL (ref 80–158)
SCAN RESULT: NORMAL

## 2024-05-09 PROCEDURE — 99204 OFFICE O/P NEW MOD 45 MIN: CPT | Performed by: INTERNAL MEDICINE

## 2024-05-09 NOTE — LETTER
May 9, 2024     Andres Hicks MD  [address] [facility] [address]    Patient: Fartun Hurley   YOB: 1965   Date of Visit: 5/9/2024       Dear Dr. Hicks:    Thank you for referring Fartun Hurley to me for evaluation. Below are my notes for this consultation.    If you have questions, please do not hesitate to call me. I look forward to following your patient along with you.         Sincerely,        Steve Pimentel MD        CC: No Recipients    Steve Pimentel MD  5/9/2024  9:39 AM  Incomplete  NEPHROLOGY OFFICE CONSULT  Fartun Hurley 58 y.o. female MRN: [id number]    Encounter: [id number] DATE: 5/9/2024    REASON FOR VISIT: Fartun Hurley is a 58 y.o.female who was referred by  for evaluation..    HPI:    This is a 58 years old female with past medical history of hypertension, dyslipidemia, chronic kidney disease stage II/III who was referred to renal office by her PCP due to elevated renal parameters.  Patient could not recall however underwent a renal ultrasound in the year 2018 which had revealed bilateral medical renal disease with increased echogenicity bilaterally.  In the right 2022 patient was noted to have a serum creatinine of 1.2 mg/dL and EGFR less than 60%.  Patient does admit to suboptimal intake of free water.  Since then however patient had been ingesting at least 2 L of water on a daily basis.  Labs obtained recently had revealed serum creatinine of 0.9 mg/dL EGFR of 63 and improved.  Patient avoids NSAIDs.  Drinks alcohol occasionally.  Denies smoking cigarettes.          PAST MEDICAL HISTORY:  Past Medical History:   Diagnosis Date   • High blood pressure    • High cholesterol    • Pre-diabetes        PAST SURGICAL HISTORY:  Past Surgical History:   Procedure Laterality Date   • BREAST CYST EXCISION Left 01/01/1980    neg       SOCIAL HISTORY:  Social History     Substance and Sexual Activity   Alcohol Use Yes  "    Social History     Substance and Sexual Activity   Drug Use No     Social History     Tobacco Use   Smoking Status Never   Smokeless Tobacco Never       FAMILY HISTORY:  Family History   Problem Relation Age of Onset   • Diabetes Mother    • Heart disease Mother    • Alzheimer's disease Father    • Hypertension Father    • No Known Problems Sister    • No Known Problems Sister    • No Known Problems Sister    • No Known Problems Sister    • No Known Problems Daughter    • No Known Problems Daughter    • No Known Problems Maternal Grandmother    • No Known Problems Paternal Grandmother    • No Known Problems Maternal Aunt    • No Known Problems Maternal Aunt    • Breast cancer Maternal Aunt    • Breast cancer Maternal Aunt    • No Known Problems Paternal Aunt    • Colon cancer Neg Hx    • Ovarian cancer Neg Hx    • Uterine cancer Neg Hx    • Cervical cancer Neg Hx        ALLERGY:  No Known Allergies    MEDICATIONS:    Current Outpatient Medications:   •  Cholecalciferol (Vitamin D3) 1.25 MG (95797 UT) CAPS, Take 1 capsule (50,000 Units total) by mouth once a week for 24 doses, Disp: 12 capsule, Rfl: 1  •  NIFEdipine ER (ADALAT CC) 60 MG 24 hr tablet, Take 1 tablet (60 mg total) by mouth daily, Disp: 90 tablet, Rfl: 1    REVIEW OF SYSTEMS:    Review of Systems   Constitutional: Negative.    HENT: Negative.     Eyes: Negative.    Respiratory: Negative.     Cardiovascular: Negative.    Gastrointestinal: Negative.    Endocrine: Negative.    Genitourinary: Negative.    Musculoskeletal: Negative.    Skin: Negative.    Allergic/Immunologic: Negative.    Neurological: Negative.    Hematological: Negative.    All other systems reviewed and are negative.      PHYSICAL EXAM:  Vitals:    05/09/24 0856   BP: 120/80   Pulse: 96   Resp: 16   Temp: (!) 96.9 °F (36.1 °C)   TempSrc: Temporal   SpO2: 98%   Weight: 76.2 kg (168 lb)   Height: 5' 2\" (1.575 m)     Body mass index is 30.73 kg/m².    Physical Exam  Constitutional:       " Appearance: She is well-developed.   HENT:      Head: Normocephalic and atraumatic.   Eyes:      Pupils: Pupils are equal, round, and reactive to light.   Cardiovascular:      Rate and Rhythm: Normal rate and regular rhythm.      Heart sounds: Normal heart sounds.   Pulmonary:      Effort: Pulmonary effort is normal.   Abdominal:      General: Bowel sounds are normal.      Palpations: Abdomen is soft.   Musculoskeletal:         General: Normal range of motion.      Cervical back: Neck supple.   Skin:     General: Skin is warm.   Neurological:      Mental Status: She is alert and oriented to person, place, and time.         LAB RESULTS:  Results for orders placed or performed in visit on 05/06/24   CBC and differential   Result Value Ref Range    WBC 3.68 (L) 4.31 - 10.16 Thousand/uL    RBC 5.21 (H) 3.81 - 5.12 Million/uL    Hemoglobin 14.0 11.5 - 15.4 g/dL    Hematocrit 44.3 34.8 - 46.1 %    MCV 85 82 - 98 fL    MCH 26.9 26.8 - 34.3 pg    MCHC 31.6 31.4 - 37.4 g/dL    RDW 13.2 11.6 - 15.1 %    MPV 11.6 8.9 - 12.7 fL    Platelets 376 149 - 390 Thousands/uL    nRBC 0 /100 WBCs   Comprehensive metabolic panel   Result Value Ref Range    Sodium 142 135 - 147 mmol/L    Potassium 3.5 3.5 - 5.3 mmol/L    Chloride 104 96 - 108 mmol/L    CO2 30 21 - 32 mmol/L    ANION GAP 8 4 - 13 mmol/L    BUN 11 5 - 25 mg/dL    Creatinine 0.97 0.60 - 1.30 mg/dL    Glucose, Fasting 116 (H) 65 - 99 mg/dL    Calcium 9.7 8.4 - 10.2 mg/dL    AST 15 13 - 39 U/L    ALT 15 7 - 52 U/L    Alkaline Phosphatase 69 34 - 104 U/L    Total Protein 7.7 6.4 - 8.4 g/dL    Albumin 4.6 3.5 - 5.0 g/dL    Total Bilirubin 0.42 0.20 - 1.00 mg/dL    eGFR 63 ml/min/1.73sq m   Protein / creatinine ratio, urine   Result Value Ref Range    Creatinine, Ur 28.1 Reference range not established. mg/dL    Protein Urine Random <4 Reference range not established. mg/dL    Prot/Creat Ratio, Ur     Phosphorus   Result Value Ref Range    Phosphorus 2.9 2.3 - 4.1 mg/dL   PTH,  intact   Result Value Ref Range    PTH 52.7 12.0 - 88.0 pg/mL   Urinalysis with microscopic   Result Value Ref Range    Color, UA Colorless     Clarity, UA Clear     Specific Gravity, UA 1.009 1.003 - 1.030    pH, UA 7.0 4.5, 5.0, 5.5, 6.0, 6.5, 7.0, 7.5, 8.0    Leukocytes, UA Small (A) Negative    Nitrite, UA Negative Negative    Protein, UA Negative Negative mg/dl    Glucose, UA Negative Negative mg/dl    Ketones, UA Negative Negative mg/dl    Urobilinogen, UA <2.0 <2.0 mg/dl mg/dl    Bilirubin, UA Negative Negative    Occult Blood, UA Negative Negative    RBC, UA None Seen None Seen, 1-2 /hpf    WBC, UA 1-2 None Seen, 1-2 /hpf    Epithelial Cells Occasional None Seen, Occasional /hpf    Bacteria, UA None Seen None Seen, Occasional /hpf   Vitamin D 25 hydroxy   Result Value Ref Range    Vit D, 25-Hydroxy 81.3 30.0 - 100.0 ng/mL   CMV IgG/IgM Antibodies   Result Value Ref Range    CMV IGG 9.60 (H) 0.00 - 0.59 U/mL    CMV IgM <30.0 0.0 - 29.9 AU/mL   Leukemia/Lymphoma flow cytometry   Result Value Ref Range    Scan Result SEE WRITTEN REPORT    Chronic Hepatitis Panel   Result Value Ref Range    Hepatitis B Surface Ag Non-reactive Non-Reactive    Hepatitis C Ab Non-reactive Non-Reactive    Hep B C IgM Non-reactive Non-Reactive    Hep B Core Total Ab Non-reactive Non-Reactive   C-reactive protein   Result Value Ref Range    CRP <1.0 <3.0 mg/L   Vitamin B12   Result Value Ref Range    Vitamin B-12 643 180 - 914 pg/mL   Peripheral Smear   Result Value Ref Range    Total Counted 100     Segmented % 23 %    Lymphocytes % 67 %    Monocytes % 8 4 - 12 %    Eosinophils % 1 0 - 6 %    Basophils % 1 0 - 1 %    Absolute Neutrophils 0.85 (L) 1.81 - 6.82 Thousand/uL    Absolute Lymphocytes 2.47 0.60 - 4.47 Thousand/uL    Absolute Monocytes 0.29 0.00 - 1.22 Thousand/uL    Absolute Eosinophils 0.04 0.00 - 0.61 Thousand/uL    Absolute Basophils 0.04 0.00 - 0.10 Thousand/uL    RBC Morphology Present     Platelet Estimate Adequate  Adequate    [place] Cells Present     Ovalocytes Present     Poikilocytes Present    EBV acute panel   Result Value Ref Range    EBV VCA IgG 104.0 (H) 0.0 - 17.9 U/mL    EBV VCA IgM <36.0 0.0 - 35.9 U/mL    EBV Nuclear Ag Ab 308.0 (H) 0.0 - 17.9 U/mL    INTERPRETATION Comment    Lyme Total AB W Reflex to IGM/IGG   Result Value Ref Range    Lyme Total Antibodies Negative Negative         ASSESSMENT and PLAN:  Fartun was seen today for chronic kidney disease and consult.    Diagnoses and all orders for this visit:    Stage 3a chronic kidney disease (CKD) ([facility])  -     Ambulatory Referral to Nephrology  -     Albumin; Standing  -     Albumin / creatinine urine ratio; Standing  -     Calcium; Standing  -     CBC and differential; Standing  -     Comprehensive metabolic panel; Standing  -     Phosphorus; Standing  -     PTH, intact; Standing  -     Urinalysis with microscopic; Future  -     Vitamin D 25 hydroxy; Standing      58 years old female with past medical history of hypertension, chronic kidney disease stage II/IIIa who is here for management of CKD.    1.  Chronic kidney disease stage IIIa: Noted in the year 2022 with several lab values with a EGFR being less than 60%.  Etiology is likely hypertensive kidney disease.  Blood pressure is within acceptable range at present time while on nifedipine alone.  Most recent labs revealing EGFR of 63 with serum creatinine of 0.9 mg/dL.  Reultrasound in the year 2018 had already revealed bilateral medical renal renal disease with increased bilateral echogenicity.    2.  Hypertension: Blood pressure is within excellent range at 120/80 while on nifedipine 60 mg daily.  Low-salt diet recommended.    3.  Anemia due to CKD: Hemoglobin is within acceptable range.  Target hemoglobin is 10 to 12 g/dL.    4.  Bone mineral disorder: 25-hydroxy vitamin D level is 81 and acceptable.  Acceptable value of PTH, calcium, phosphorus.    5.  Obesity: BMI is 30.73 and above target.  Lifestyle  modification includes weight loss via exercise recommended.    6.  Nutrition: 64 ounces of water intake, low-salt moderate protein diet up to 80 g daily recommended.    .

## 2024-05-09 NOTE — PROGRESS NOTES
NEPHROLOGY OFFICE CONSULT  Fartun Hurley 58 y.o. female MRN: [id number]    Encounter: [id number] DATE: 5/9/2024    REASON FOR VISIT: Fartun Hurley is a 58 y.o.female who was referred by  for evaluation..    HPI:    This is a 58 years old female with past medical history of hypertension, dyslipidemia, chronic kidney disease stage II/III who was referred to renal office by her PCP due to elevated renal parameters.  Patient could not recall however underwent a renal ultrasound in the year 2018 which had revealed bilateral medical renal disease with increased echogenicity bilaterally.  In the right 2022 patient was noted to have a serum creatinine of 1.2 mg/dL and EGFR less than 60%.  Patient does admit to suboptimal intake of free water.  Since then however patient had been ingesting at least 2 L of water on a daily basis.  Labs obtained recently had revealed serum creatinine of 0.9 mg/dL EGFR of 63 and improved.  Patient avoids NSAIDs.  Drinks alcohol occasionally.  Denies smoking cigarettes.          PAST MEDICAL HISTORY:  Past Medical History:   Diagnosis Date    High blood pressure     High cholesterol     Pre-diabetes        PAST SURGICAL HISTORY:  Past Surgical History:   Procedure Laterality Date    BREAST CYST EXCISION Left 01/01/1980    neg       SOCIAL HISTORY:  Social History     Substance and Sexual Activity   Alcohol Use Yes     Social History     Substance and Sexual Activity   Drug Use No     Social History     Tobacco Use   Smoking Status Never   Smokeless Tobacco Never       FAMILY HISTORY:  Family History   Problem Relation Age of Onset    Diabetes Mother     Heart disease Mother     Alzheimer's disease Father     Hypertension Father     No Known Problems Sister     No Known Problems Sister     No Known Problems Sister     No Known Problems Sister     No Known Problems Daughter     No Known Problems Daughter     No Known Problems Maternal Grandmother     No Known  "Problems Paternal Grandmother     No Known Problems Maternal Aunt     No Known Problems Maternal Aunt     Breast cancer Maternal Aunt     Breast cancer Maternal Aunt     No Known Problems Paternal Aunt     Colon cancer Neg Hx     Ovarian cancer Neg Hx     Uterine cancer Neg Hx     Cervical cancer Neg Hx        ALLERGY:  No Known Allergies    MEDICATIONS:    Current Outpatient Medications:     Cholecalciferol (Vitamin D3) 1.25 MG (35739 UT) CAPS, Take 1 capsule (50,000 Units total) by mouth once a week for 24 doses, Disp: 12 capsule, Rfl: 1    NIFEdipine ER (ADALAT CC) 60 MG 24 hr tablet, Take 1 tablet (60 mg total) by mouth daily, Disp: 90 tablet, Rfl: 1    REVIEW OF SYSTEMS:    Review of Systems   Constitutional: Negative.    HENT: Negative.     Eyes: Negative.    Respiratory: Negative.     Cardiovascular: Negative.    Gastrointestinal: Negative.    Endocrine: Negative.    Genitourinary: Negative.    Musculoskeletal: Negative.    Skin: Negative.    Allergic/Immunologic: Negative.    Neurological: Negative.    Hematological: Negative.    All other systems reviewed and are negative.      PHYSICAL EXAM:  Vitals:    05/09/24 0856   BP: 120/80   Pulse: 96   Resp: 16   Temp: (!) 96.9 °F (36.1 °C)   TempSrc: Temporal   SpO2: 98%   Weight: 76.2 kg (168 lb)   Height: 5' 2\" (1.575 m)     Body mass index is 30.73 kg/m².    Physical Exam  Constitutional:       Appearance: She is well-developed.   HENT:      Head: Normocephalic and atraumatic.   Eyes:      Pupils: Pupils are equal, round, and reactive to light.   Cardiovascular:      Rate and Rhythm: Normal rate and regular rhythm.      Heart sounds: Normal heart sounds.   Pulmonary:      Effort: Pulmonary effort is normal.   Abdominal:      General: Bowel sounds are normal.      Palpations: Abdomen is soft.   Musculoskeletal:         General: Normal range of motion.      Cervical back: Neck supple.   Skin:     General: Skin is warm.   Neurological:      Mental Status: She is " alert and oriented to person, place, and time.         LAB RESULTS:  Results for orders placed or performed in visit on 05/06/24   CBC and differential   Result Value Ref Range    WBC 3.68 (L) 4.31 - 10.16 Thousand/uL    RBC 5.21 (H) 3.81 - 5.12 Million/uL    Hemoglobin 14.0 11.5 - 15.4 g/dL    Hematocrit 44.3 34.8 - 46.1 %    MCV 85 82 - 98 fL    MCH 26.9 26.8 - 34.3 pg    MCHC 31.6 31.4 - 37.4 g/dL    RDW 13.2 11.6 - 15.1 %    MPV 11.6 8.9 - 12.7 fL    Platelets 376 149 - 390 Thousands/uL    nRBC 0 /100 WBCs   Comprehensive metabolic panel   Result Value Ref Range    Sodium 142 135 - 147 mmol/L    Potassium 3.5 3.5 - 5.3 mmol/L    Chloride 104 96 - 108 mmol/L    CO2 30 21 - 32 mmol/L    ANION GAP 8 4 - 13 mmol/L    BUN 11 5 - 25 mg/dL    Creatinine 0.97 0.60 - 1.30 mg/dL    Glucose, Fasting 116 (H) 65 - 99 mg/dL    Calcium 9.7 8.4 - 10.2 mg/dL    AST 15 13 - 39 U/L    ALT 15 7 - 52 U/L    Alkaline Phosphatase 69 34 - 104 U/L    Total Protein 7.7 6.4 - 8.4 g/dL    Albumin 4.6 3.5 - 5.0 g/dL    Total Bilirubin 0.42 0.20 - 1.00 mg/dL    eGFR 63 ml/min/1.73sq m   Protein / creatinine ratio, urine   Result Value Ref Range    Creatinine, Ur 28.1 Reference range not established. mg/dL    Protein Urine Random <4 Reference range not established. mg/dL    Prot/Creat Ratio, Ur     Phosphorus   Result Value Ref Range    Phosphorus 2.9 2.3 - 4.1 mg/dL   PTH, intact   Result Value Ref Range    PTH 52.7 12.0 - 88.0 pg/mL   Urinalysis with microscopic   Result Value Ref Range    Color, UA Colorless     Clarity, UA Clear     Specific Gravity, UA 1.009 1.003 - 1.030    pH, UA 7.0 4.5, 5.0, 5.5, 6.0, 6.5, 7.0, 7.5, 8.0    Leukocytes, UA Small (A) Negative    Nitrite, UA Negative Negative    Protein, UA Negative Negative mg/dl    Glucose, UA Negative Negative mg/dl    Ketones, UA Negative Negative mg/dl    Urobilinogen, UA <2.0 <2.0 mg/dl mg/dl    Bilirubin, UA Negative Negative    Occult Blood, UA Negative Negative    RBC, UA None  Seen None Seen, 1-2 /hpf    WBC, UA 1-2 None Seen, 1-2 /hpf    Epithelial Cells Occasional None Seen, Occasional /hpf    Bacteria, UA None Seen None Seen, Occasional /hpf   Vitamin D 25 hydroxy   Result Value Ref Range    Vit D, 25-Hydroxy 81.3 30.0 - 100.0 ng/mL   CMV IgG/IgM Antibodies   Result Value Ref Range    CMV IGG 9.60 (H) 0.00 - 0.59 U/mL    CMV IgM <30.0 0.0 - 29.9 AU/mL   Leukemia/Lymphoma flow cytometry   Result Value Ref Range    Scan Result SEE WRITTEN REPORT    Chronic Hepatitis Panel   Result Value Ref Range    Hepatitis B Surface Ag Non-reactive Non-Reactive    Hepatitis C Ab Non-reactive Non-Reactive    Hep B C IgM Non-reactive Non-Reactive    Hep B Core Total Ab Non-reactive Non-Reactive   C-reactive protein   Result Value Ref Range    CRP <1.0 <3.0 mg/L   Vitamin B12   Result Value Ref Range    Vitamin B-12 643 180 - 914 pg/mL   Peripheral Smear   Result Value Ref Range    Total Counted 100     Segmented % 23 %    Lymphocytes % 67 %    Monocytes % 8 4 - 12 %    Eosinophils % 1 0 - 6 %    Basophils % 1 0 - 1 %    Absolute Neutrophils 0.85 (L) 1.81 - 6.82 Thousand/uL    Absolute Lymphocytes 2.47 0.60 - 4.47 Thousand/uL    Absolute Monocytes 0.29 0.00 - 1.22 Thousand/uL    Absolute Eosinophils 0.04 0.00 - 0.61 Thousand/uL    Absolute Basophils 0.04 0.00 - 0.10 Thousand/uL    RBC Morphology Present     Platelet Estimate Adequate Adequate    Royal Cells Present     Ovalocytes Present     Poikilocytes Present    EBV acute panel   Result Value Ref Range    EBV VCA IgG 104.0 (H) 0.0 - 17.9 U/mL    EBV VCA IgM <36.0 0.0 - 35.9 U/mL    EBV Nuclear Ag Ab 308.0 (H) 0.0 - 17.9 U/mL    INTERPRETATION Comment    Lyme Total AB W Reflex to IGM/IGG   Result Value Ref Range    Lyme Total Antibodies Negative Negative         ASSESSMENT and PLAN:  Fartun was seen today for chronic kidney disease and consult.    Diagnoses and all orders for this visit:    Stage 3a chronic kidney disease (CKD) ([facility])  -     Ambulatory  Referral to Nephrology  -     Albumin; Standing  -     Albumin / creatinine urine ratio; Standing  -     Calcium; Standing  -     CBC and differential; Standing  -     Comprehensive metabolic panel; Standing  -     Phosphorus; Standing  -     PTH, intact; Standing  -     Urinalysis with microscopic; Future  -     Vitamin D 25 hydroxy; Standing      58 years old female with past medical history of hypertension, chronic kidney disease stage II/IIIa who is here for management of CKD.    1.  Chronic kidney disease stage IIIa: Noted in the year 2022 with several lab values with a EGFR being less than 60%.  Etiology is likely hypertensive kidney disease.  Blood pressure is within acceptable range at present time while on nifedipine alone.  Most recent labs revealing EGFR of 63 with serum creatinine of 0.9 mg/dL.  Reultrasound in the year 2018 had already revealed bilateral medical renal renal disease with increased bilateral echogenicity.    2.  Hypertension: Blood pressure is within excellent range at 120/80 while on nifedipine 60 mg daily.  Low-salt diet recommended.    3.  Anemia due to CKD: Hemoglobin is within acceptable range.  Target hemoglobin is 10 to 12 g/dL.    4.  Bone mineral disorder: 25-hydroxy vitamin D level is 81 and acceptable.  Acceptable value of PTH, calcium, phosphorus.    5.  Obesity: BMI is 30.73 and above target.  Lifestyle modification includes weight loss via exercise recommended.    6.  Nutrition: 64 ounces of water intake, low-salt moderate protein diet up to 80 g daily recommended.    .

## 2024-05-13 LAB — METHYLMALONATE SERPL-SCNC: 193 NMOL/L (ref 0–378)

## 2024-05-15 LAB — SCAN RESULT: NORMAL

## 2024-05-16 ENCOUNTER — HOSPITAL ENCOUNTER (OUTPATIENT)
Dept: ULTRASOUND IMAGING | Facility: HOSPITAL | Age: 59
Discharge: HOME | End: 2024-05-16
Payer: COMMERCIAL

## 2024-05-16 ENCOUNTER — HOSPITAL ENCOUNTER (OUTPATIENT)
Dept: ULTRASOUND IMAGING | Facility: HOSPITAL | Age: 59
End: 2024-05-16
Payer: COMMERCIAL

## 2024-05-16 ENCOUNTER — HOSPITAL ENCOUNTER (OUTPATIENT)
Dept: ULTRASOUND IMAGING | Facility: HOSPITAL | Age: 59
Discharge: HOME | End: 2024-05-16

## 2024-05-16 DIAGNOSIS — N18.31 CKD STAGE 3A, GFR 45-59 ML/MIN (HCC): ICD-10-CM

## 2024-05-16 DIAGNOSIS — D70.9 NEUTROPENIA, UNSPECIFIED TYPE (HCC): ICD-10-CM

## 2024-05-16 DIAGNOSIS — D72.820 LYMPHOCYTOSIS: ICD-10-CM

## 2024-05-16 PROCEDURE — 51798 US URINE CAPACITY MEASURE: CPT

## 2024-05-16 PROCEDURE — 76700 US EXAM ABDOM COMPLETE: CPT

## 2024-05-19 ENCOUNTER — RA CDI HCC (OUTPATIENT)
Dept: OTHER | Facility: HOSPITAL | Age: 59
End: 2024-05-19

## 2024-05-20 NOTE — PROGRESS NOTES
HCC coding opportunities          Chart Reviewed number of suggestions sent to Provider: 1     Patients Insurance        Commercial Insurance: [employer]     E11.65

## 2024-05-28 ENCOUNTER — OFFICE VISIT (OUTPATIENT)
Age: 59
End: 2024-05-28
Payer: COMMERCIAL

## 2024-05-28 VITALS
OXYGEN SATURATION: 98 % | BODY MASS INDEX: 30.55 KG/M2 | HEIGHT: 62 IN | DIASTOLIC BLOOD PRESSURE: 80 MMHG | TEMPERATURE: 95.8 F | WEIGHT: 166 LBS | RESPIRATION RATE: 12 BRPM | HEART RATE: 85 BPM | SYSTOLIC BLOOD PRESSURE: 120 MMHG

## 2024-05-28 DIAGNOSIS — R73.03 PREDIABETES: ICD-10-CM

## 2024-05-28 DIAGNOSIS — E78.2 MIXED HYPERLIPIDEMIA: Primary | ICD-10-CM

## 2024-05-28 DIAGNOSIS — E11.22 TYPE 2 DIABETES MELLITUS WITH CHRONIC KIDNEY DISEASE, WITHOUT LONG-TERM CURRENT USE OF INSULIN, UNSPECIFIED CKD STAGE (HCC): ICD-10-CM

## 2024-05-28 DIAGNOSIS — I10 ESSENTIAL HYPERTENSION: ICD-10-CM

## 2024-05-28 DIAGNOSIS — E55.9 VITAMIN D INSUFFICIENCY: ICD-10-CM

## 2024-05-28 DIAGNOSIS — N18.2 CHRONIC KIDNEY DISEASE, STAGE II (MILD): ICD-10-CM

## 2024-05-28 DIAGNOSIS — R79.89 ABNORMAL CBC: ICD-10-CM

## 2024-05-28 PROBLEM — R42 DIZZINESS: Status: RESOLVED | Noted: 2024-01-22 | Resolved: 2024-05-28

## 2024-05-28 LAB — SL AMB POCT HEMOGLOBIN AIC: 6.4 (ref ?–6.5)

## 2024-05-28 PROCEDURE — 99214 OFFICE O/P EST MOD 30 MIN: CPT | Performed by: FAMILY MEDICINE

## 2024-05-28 PROCEDURE — 83036 HEMOGLOBIN GLYCOSYLATED A1C: CPT | Performed by: FAMILY MEDICINE

## 2024-05-28 NOTE — PROGRESS NOTES
Diabetic Foot Exam    Patient's shoes and socks removed.    Right Foot/Ankle   Right Foot Inspection  Skin Exam: skin normal. Skin not intact, no dry skin, no warmth, no callus, no erythema, no maceration, no abnormal color, no pre-ulcer, no ulcer and no callus.     Toe Exam: ROM and strength within normal limits. No swelling, no tenderness, erythema and  no right toe deformity    Sensory   Monofilament testing: intact    Vascular  The right DP pulse is 2+. The right PT pulse is 2+.     Left Foot/Ankle  Left Foot Inspection  Skin Exam: skin normal. Skin not intact, no dry skin, no warmth, no erythema, no maceration, normal color, no pre-ulcer, no ulcer and no callus.     Toe Exam: ROM and strength within normal limits. No swelling, no tenderness, no erythema and no left toe deformity.     Sensory   Monofilament testing: intact    Vascular  The left DP pulse is 2+. The left PT pulse is 2+.     Assign Risk Category  No deformity present  No loss of protective sensation  No weak pulses  Risk: 0

## 2024-05-28 NOTE — PROGRESS NOTES
[facility]IMARY [facility]  Ambulatory visit     Name: Fartun Gardnerb   YOB: 1965   MRN: [id number]  Encounter Provider: Andres Hicks MD    Encounter Date: 5/28/2024    ASSESSMENT & PLAN    Assessment & Plan   Stage 3a chronic kidney disease (CKD) ([facility])  Lab Results   Component Value Date    EGFR 63 05/06/2024    EGFR 46 02/26/2024    EGFR 39 01/21/2024    CREATININE 0.97 05/06/2024    CREATININE 1.25 02/26/2024    CREATININE 1.44 (H) 01/21/2024   Previously referred to nephrology with GFR of 46, improving GFR.  Labs reviewed & discussed   Patient was seen by nephrology on 5/9/2024, Dr. Pimentel, note reviewed.  Recommended low-salt diet, hemoglobin at target range of 10-12.  Up to 80 g of protein, low-salt, 64 ounces of water.  Etiology likely hypertensive kidney disease.  Renal ultrasound in 2018 revealed bilateral medical renal disease with increased bilateral echogenicity.  Improve lifestyle and dietary changes to slow its progression  Improve lifestyle and dietary changes to slow its progression  Low-sodium, low-carb diet, limit snacking, exercise regularly.  Avoid nephrotoxic agents including over-the-counter NSAIDs as discussed  Hydrate well with water.    Mixed hyperlipidemia  Previously started on rosuvastatin 10 mg daily  Triglycerides/HDL ratio: 2.45. Goal <2, 02/26/2024   Discussed importance of nutritional intake, exercising regularly.  Follow-up with lipid panel in 3 months, return in 3 months to discuss.  Decrease calorie intake as discussed  Recommend low-carb diet  Exercise regularly as discussed  In addition to diet supplement omega-3 with fish oil as discussed.  Monitor lipid panel in 6-12 months.     Essential hypertension  Controlled on nifedipine 60 mg once a day  Blood Pressure: 120/80    Advised improving water hydration with a goal of reaching 64 ounces over weeks  Continue nifedipine 60 mg once a day  Low-sodium, low-carb, high potassium diet as  discussed  Return in 3 months     Type 2 diabetes mellitus with chronic kidney disease, without long-term current use of insulin, unspecified CKD stage/prediabetes  Lab Results   Component Value Date    HGBA1C 6.4 05/28/2024   Improved through diet previously 7.2, now 6.4. Patient has been prediabetic/diabetic on and off for several years now.  Would like to continue to manage with diet.  Discussed its effect on the kidneys.  Follows nephrologist with CKD stage III a for several years now   Apt eye doctor 05/29/2024  Recommended low-carb, low-sodium diet limit snacking consider intermittent fasting, printout provided.  Follow-up with A1c in 3 months  Return in 3 months      Vitamin D insufficiency  Reviewed & Discussed labs  Optimal vitamin D levels  Goal , preventative therapy   Take vitamin D3 5,000 units-vitamin K2 200 mcg combination pill daily with food  Follow-up with vitamin D blood work annually     Abnormal CBC  Follows hematology, has upcoming appointment.  Recently completed several labs, per specialist comment likely reactive/benign.   Patient was seen by heme oncology on 5/2/2024 for lymphocytosis and neutropenia.  Unclear etiology of relative lymphocytosis with normal absolute lymphocyte count.  May be due to hematological malignancy versus chronic viral infection versus Lyme disease.  Workup for lymphocytosis hopefully will also help to determine underlying etiology of neutropenia.  Additionally recommending abdominal ultrasound to rule out splenomegaly.  Abdominal ultrasound normal, no splenomegaly.  Continue care with specialist.            Depression Screening and Follow-up Plan: Patient was screened for depression during today's encounter. They screened negative with a PHQ-2 score of 0.       DIAGNOSIS & ORDERS   1. Mixed hyperlipidemia  -     Lipid Panel with Direct LDL reflex; Future; Expected date: 08/28/2024  2. Essential hypertension  3. Type 2 diabetes mellitus with chronic kidney  "disease, without long-term current use of insulin, unspecified CKD stage (HCC)  -     POCT hemoglobin A1c  -     IRIS Diabetic eye exam  -     Hemoglobin A1C; Future; Expected date: 08/28/2024  4. Prediabetes  5. Chronic kidney disease, stage II (mild)  6. Abnormal CBC  7. Vitamin D insufficiency    FOLLOW-UP PLANS   Return in about 3 months (around 8/28/2024) for after completion of labs.    Current Medication List:     Current Outpatient Medications:     Cholecalciferol (Vitamin D3) 1.25 MG (42001 UT) CAPS, Take 1 capsule (50,000 Units total) by mouth once a week for 24 doses, Disp: 12 capsule, Rfl: 1    NIFEdipine ER (ADALAT CC) 60 MG 24 hr tablet, Take 1 tablet (60 mg total) by mouth daily, Disp: 90 tablet, Rfl: 1  Subjective   History of Present Illness       Fartun Hurley is here for an office visit.   HPI  Review of Systems    Objective:     /80 (BP Location: Left arm, Patient Position: Sitting, Cuff Size: Standard)   Pulse 85   Temp (!) 95.8 °F (35.4 °C) (Tympanic)   Resp 12   Ht 5' 2\" (1.575 m)   Wt 75.3 kg (166 lb)   SpO2 98%   BMI 30.36 kg/m²      Physical Exam  Vitals reviewed.   Constitutional:       General: She is not in acute distress.     Appearance: Normal appearance. She is not ill-appearing, toxic-appearing or diaphoretic.   HENT:      Head: Normocephalic and atraumatic.      Right Ear: External ear normal.      Left Ear: External ear normal.      Nose: No congestion or rhinorrhea.   Eyes:      General: No scleral icterus.        Right eye: No discharge.         Left eye: No discharge.      Conjunctiva/sclera: Conjunctivae normal.   Cardiovascular:      Rate and Rhythm: Normal rate.   Pulmonary:      Effort: Pulmonary effort is normal. No respiratory distress.   Neurological:      General: No focal deficit present.      Mental Status: She is alert and oriented to person, place, and time.   Psychiatric:         Mood and Affect: Mood normal.         Behavior: Behavior normal.  "        Thought Content: Thought content normal.           Andres Hicks MD  Family Medicine Physician   [facility] [place]        Administrative Statements

## 2024-05-28 NOTE — PATIENT INSTRUCTIONS
Recommendations for bone and heart health  Take vitamin D3 5,000 units-vitamin K2 100-200 mcg combination pill daily with food to maintain high-normal levels of vitamin D (You can get them from vitamin shops or on [employer]).  Take fish oil with omega-3 5737-8640 mg daily to reduce risk of heart disease.       Why is Vitamin D important?  Adequate vitamin D levels reduces the risk of fractures and osteoporosis.  Vitamin D is involved in modulating the immune system, enhancing the body's defense against infections and supporting overall immune function.  Adequate vitamin D levels are associated with better muscle strength and function.  Deficiency may contribute to muscle weakness and an increase risk of falls in older adults.  Multiple research studies have associated low vitamin D with increased risk of autoimmune disease, cancers including breast cancer, and multiple sclerosis.  Some research also link vitamin D deficiency to increase risk of cardiovascular disease. Vitamin D also plays a role in synthesis of various hormones, including insulin.  It may play a role in insulin sensitivity and glucose metabolism. Adequate levels of vitamin D may also plays a role in supporting mental wellbeing.  In conclusion, lets bring your vitamin D levels up to >65 to prevent many diseases and conditions!     Why is vitamin K2 important?  Vitamin K2 helps ensure that the calcium we obtain through diet/supplements is directed to the bones for proper mineralization, contributing to bone strength and density.  Vitamin K2 also helps to activate proteins that prevent calcium from depositing in the arterial walls and reducing the risk of arterial calcification. Arterial calcification leads to heart and vascular disease. Especially since we are optimizing your vitamin D levels, one of the functions for vitamin D is to absorb calcium from the gastrointestinal track. This will increase calcium levels in the body. Supplementing vitamin K2  will ensure in removing the calcium from the blood vessels. This will reduce calcification of the vessels, reducing risk of heart and vascular disease.  Let's get your calcium where it belongs, into the bones and out of the vessels with vitamin K2!    Why is fish oil with omega-3 important?  Omega-3 fatty acids and fish oil or associated with reduced risk of heart disease.  They help to improve balance of cholesterol by increasing HDL (good cholesterol) and reducing triglycerides. They also reduce blood pressure and have anti-inflammatory effects contributing to overall cardiovascular health.  Omega-threes have anti-inflammatory properties that may benefit individuals with joint conditions such as rheumatoid arthritis, and other chronic inflammatory conditions such as IBD.  They can help reduce inflammation and alleviate symptoms. Let's prevent diseases, avoid the need of medications and start your healthy journey with these supplements (in addition to improving your nutritional intake and regular exercise)!

## 2024-05-29 ENCOUNTER — TELEPHONE (OUTPATIENT)
Dept: HEMATOLOGY ONCOLOGY | Facility: CLINIC | Age: 59
End: 2024-05-29

## 2024-05-29 NOTE — TELEPHONE ENCOUNTER
Appointment Change  Cancel, Reschedule, Change to Virtual      Who are you speaking with? Patient   If it is not the patient, is the caller listed on the communication consent form? Yes   Which provider is the appointment scheduled with? Yue Mccormack    When was the original appointment scheduled?    Please list date and time 6/5   At which location is the appointment scheduled to take place? [place]   Was the appointment rescheduled?     Was the appointment changed from an in person visit to a virtual visit?    If so, please list the details of the change. 5/30 11:30am   What is the reason for the appointment change? sooner       Was STAR transport scheduled? No   Does STAR transport need to be scheduled for the new visit (if applicable) No   Does the patient need an infusion appointment rescheduled? No   Does the patient have an upcoming infusion appointment scheduled? If so, when? No   Is the patient undergoing chemotherapy? No   For appointments cancelled with less than 24 hours:  Was the no-show policy reviewed? Yes

## 2024-05-30 ENCOUNTER — OFFICE VISIT (OUTPATIENT)
Dept: HEMATOLOGY ONCOLOGY | Facility: CLINIC | Age: 59
End: 2024-05-30
Payer: COMMERCIAL

## 2024-05-30 ENCOUNTER — HOSPITAL ENCOUNTER (OUTPATIENT)
Age: 59
Discharge: HOME | End: 2024-05-30
Payer: COMMERCIAL

## 2024-05-30 VITALS
WEIGHT: 173 LBS | BODY MASS INDEX: 31.83 KG/M2 | HEART RATE: 80 BPM | OXYGEN SATURATION: 98 % | RESPIRATION RATE: 18 BRPM | DIASTOLIC BLOOD PRESSURE: 82 MMHG | HEIGHT: 62 IN | TEMPERATURE: 98 F | SYSTOLIC BLOOD PRESSURE: 126 MMHG

## 2024-05-30 VITALS — BODY MASS INDEX: 31.1 KG/M2 | WEIGHT: 169 LBS | HEIGHT: 62 IN

## 2024-05-30 DIAGNOSIS — M81.0 AGE-RELATED OSTEOPOROSIS WITHOUT CURRENT PATHOLOGICAL FRACTURE: ICD-10-CM

## 2024-05-30 DIAGNOSIS — Z12.31 ENCOUNTER FOR SCREENING MAMMOGRAM FOR MALIGNANT NEOPLASM OF BREAST: ICD-10-CM

## 2024-05-30 DIAGNOSIS — D70.9 NEUTROPENIA, UNSPECIFIED TYPE (HCC): Primary | ICD-10-CM

## 2024-05-30 LAB
LEFT EYE DIABETIC RETINOPATHY: NORMAL
RIGHT EYE DIABETIC RETINOPATHY: NORMAL
SEVERITY (EYE EXAM): NORMAL

## 2024-05-30 PROCEDURE — 77063 BREAST TOMOSYNTHESIS BI: CPT

## 2024-05-30 PROCEDURE — 77080 DXA BONE DENSITY AXIAL: CPT

## 2024-05-30 PROCEDURE — 99213 OFFICE O/P EST LOW 20 MIN: CPT | Performed by: PHYSICIAN ASSISTANT

## 2024-05-30 PROCEDURE — 77067 SCR MAMMO BI INCL CAD: CPT

## 2024-05-30 NOTE — PROGRESS NOTES
[facility]ATOLOGY ONCOL[facility]Y SPECIAL[facility] [address]  [facility] [place] [address]  Hematology Ambulatory Follow-Up  Fartun Hurley, 1965, [id number]  5/30/2024      Assessment and Plan   1. Neutropenia, unspecified type (HCC)  - Leukemia/Lymphoma flow cytometry; Future  - CBC and differential; Standing  - Peripheral Smear; Standing  - MAURA Screen w/ Reflex to Titer/Pattern; Future  - RF Screen w/ Reflex to Titer; Future  - T Cell Receptor (TCR) Beta Gene Rearrang, PCR; Future    58 year old female who presents in consultation today for evaluation of lymphocytosis. Her most recent CBC from 02/2024 showed mild leukopenia with WBC 3.92.  White blood cell differential showed neutropenia with ANC 0.82, relative lymphocytosis with lymphocyte 63%, ALC 2.53, and monocytosis 14%.  Her hemoglobin and platelet count were normal.  Peripheral smear identified 4% atypical lymphocytes.  LD normal.  She denies any constitutional symptoms or lymphadenopathy.      Discussion/plan   T-cell abnormality may be secondary to consider infection, inflammation, autoimmune disorder, rarely neoplastic process.    Infectious workup has been negative. We will check for autoimmune disease and T cell gene receptor to evaluate for malignancy   If leukopenia worsens or she develops other cytopenias, we will pursue bone marrow biopsy.  Continue observation of CBC and peripheral smear for now every 3 months.    Patient voiced agreement and understanding to the above.   Patient advised to call the Hematology/Oncology office with any questions and concerns regarding the above.    Barrier(s) to care: None  The patient is able to self care.    Yue Mccormack PA-C   Medical Oncology/Hematology  [facility]    Subjective     Chief Complaint   Patient presents with    Follow-up     1 month follow-up       History of present illness: 58-year-old female with past medical history of  HTN, type 2 diabetes, CKD stage III, HLD who has been referred by her PCP for evaluation of lymphocytosis.     On chart review, patient has had a relative lymphocytosis, leukopenia and nuetropenia since at least 2018 with lymphocytes averaging around 49-63%.  Her highest absolute lymphocyte count was 2.8.  Her most recent CBC from 02/2024 showed mild leukopenia with WBC 3.92.  White blood cell differential showed neutropenia with ANC 0.82, relative lymphocytosis with lymphocyte 63%, ALC 2.53, and monocytosis 14%.  Her hemoglobin and platelet count were normal.     Patient endorses some mild weight loss (unable to quantify) a couple months ago however has regained that weight back.  She denies unexplained fevers, drenching night sweats, significant fatigue or lymphadenopathy.  No history of chronic infections such as HIV, hepatitis or any known autoimmune disease. She had HIV testing in 2020, reports monogamous relationship. She has no personal history of malignancy.  Does not have malabsorption disorder or history of bariatric surgery.  No tick bites.  Patient admits to social alcohol use.  Denies tobacco or drug use. Works as a  for [employer].  Great maternal aunt has history of breast cancer, no other family history of cancer.  She is up-to-date on her mammogram.  Last colonoscopy in 2014 was reportedly normal.  Cologuard normal in 2019. She travels out of the country often     She was recently diagnosed with CKD stage III which is possibly due to her hypertension/diabetes.      05/2024:   WBC 3.68, hemoglobin 13.0, platelets 376,000. Peripheral smear shows ANC 0.85  Leukemia/lymphoma flow cytometry shows T-cell with minor immuno phenotypic alterations including a small subset  (4%) of TRC TdT positive T cells with no expression of both CD4 and CD8.  Probable benign/reactive process. no abnormal B-cell population identified.   Abdominal US normal spleen and liver.  EBV IgG positive, EBC Ab+.  "CMV IgG positive. Chronic hepatitis panel negative. Lyme neg. HIV neg 2020. No high risk behavior.  BCRABL neg   B12 643, MMA, copper, vitam D all normal   CRP <1.0     05/30/24 :  No results found for: \"IRON\", \"TIBC\", \"FERRITIN\"  Lab Results   Component Value Date    WBC 3.68 (L) 05/06/2024    HGB 14.0 05/06/2024    HCT 44.3 05/06/2024    MCV 85 05/06/2024     05/06/2024       Interval history: Patient overall is feeling well.  She denies acute complaints.  Denies fever, unintentional weight loss, night sweats or lymphadenopathy.    Review of Systems   All other systems reviewed and are negative.      Patient Active Problem List   Diagnosis    Essential hypertension    Mild mitral regurgitation by prior echocardiography    Chronic kidney disease, stage II (mild)    Vitamin D insufficiency    Asymptomatic postmenopausal status    Mixed hyperlipidemia    Hiatal hernia    Flank pain    Prediabetes    Abnormal CBC     Past Medical History:   Diagnosis Date    High blood pressure     High cholesterol     Pre-diabetes      Past Surgical History:   Procedure Laterality Date    BREAST CYST EXCISION Left 01/01/1980    neg     Family History   Problem Relation Age of Onset    Diabetes Mother     Heart disease Mother     Alzheimer's disease Father     Hypertension Father     No Known Problems Sister     No Known Problems Sister     No Known Problems Sister     No Known Problems Sister     No Known Problems Daughter     No Known Problems Daughter     No Known Problems Maternal Grandmother     No Known Problems Paternal Grandmother     No Known Problems Maternal Aunt     No Known Problems Maternal Aunt     No Known Problems Paternal Aunt     Breast cancer Other 66    Breast cancer Other 66    Colon cancer Neg Hx     Ovarian cancer Neg Hx     Uterine cancer Neg Hx     Cervical cancer Neg Hx      Social History     Socioeconomic History    Marital status: /Civil Union     Spouse name: None    Number of children: " "None    Years of education: None    Highest education level: None   Occupational History    None   Tobacco Use    Smoking status: Never     Passive exposure: Never    Smokeless tobacco: Never   Vaping Use    Vaping status: Never Used   Substance and Sexual Activity    Alcohol use: Yes    Drug use: No    Sexual activity: Yes     Partners: Male     Birth control/protection: Post-menopausal   Other Topics Concern    None   Social History Narrative    None     Social Determinants of Health     Financial Resource Strain: Not on file   Food Insecurity: Not on file   Transportation Needs: Not on file   Physical Activity: Inactive (5/28/2024)    Exercise Vital Sign     Days of Exercise per Week: 0 days     Minutes of Exercise per Session: 0 min   Stress: No Stress Concern Present (3/18/2022)    [employer] - Occupational Stress Questionnaire     Feeling of Stress : Not at all   Social Connections: Not on file   Intimate Partner Violence: Not on file   Housing Stability: Not on file       Current Outpatient Medications:     Cholecalciferol (Vitamin D3) 1.25 MG (69004 UT) CAPS, Take 1 capsule (50,000 Units total) by mouth once a week for 24 doses, Disp: 12 capsule, Rfl: 1    NIFEdipine ER (ADALAT CC) 60 MG 24 hr tablet, Take 1 tablet (60 mg total) by mouth daily, Disp: 90 tablet, Rfl: 1  No Known Allergies    Objective   /82   Pulse 80   Temp 98 °F (36.7 °C) (Temporal)   Resp 18   Ht 5' 2.25\" (1.581 m)   Wt 78.5 kg (173 lb)   SpO2 98%   BMI 31.39 kg/m²    Physical Exam  Vitals reviewed.   HENT:      Head: Normocephalic.   Cardiovascular:      Rate and Rhythm: Normal rate and regular rhythm.   Pulmonary:      Effort: Pulmonary effort is normal.      Breath sounds: Normal breath sounds.   Musculoskeletal:      Cervical back: Neck supple.   Skin:     Findings: No rash.   Neurological:      Mental Status: She is alert.         Result Review  Labs:  Office Visit on 05/28/2024   Component " Date Value Ref Range Status    Hemoglobin A1C 05/28/2024 6.4  6.5 Final   Appointment on 05/06/2024   Component Date Value Ref Range Status    WBC 05/06/2024 3.68 (L)  4.31 - 10.16 Thousand/uL Final    RBC 05/06/2024 5.21 (H)  3.81 - 5.12 Million/uL Final    Hemoglobin 05/06/2024 14.0  11.5 - 15.4 g/dL Final    Hematocrit 05/06/2024 44.3  34.8 - 46.1 % Final    MCV 05/06/2024 85  82 - 98 fL Final    MCH 05/06/2024 26.9  26.8 - 34.3 pg Final    MCHC 05/06/2024 31.6  31.4 - 37.4 g/dL Final    RDW 05/06/2024 13.2  11.6 - 15.1 % Final    MPV 05/06/2024 11.6  8.9 - 12.7 fL Final    Platelets 05/06/2024 376  149 - 390 Thousands/uL Final    nRBC 05/06/2024 0  /100 WBCs Final    Sodium 05/06/2024 142  135 - 147 mmol/L Final    Potassium 05/06/2024 3.5  3.5 - 5.3 mmol/L Final    Chloride 05/06/2024 104  96 - 108 mmol/L Final    CO2 05/06/2024 30  21 - 32 mmol/L Final    ANION GAP 05/06/2024 8  4 - 13 mmol/L Final    BUN 05/06/2024 11  5 - 25 mg/dL Final    Creatinine 05/06/2024 0.97  0.60 - 1.30 mg/dL Final    Standardized to IDMS reference method    Glucose, Fasting 05/06/2024 116 (H)  65 - 99 mg/dL Final    Calcium 05/06/2024 9.7  8.4 - 10.2 mg/dL Final    AST 05/06/2024 15  13 - 39 U/L Final    ALT 05/06/2024 15  7 - 52 U/L Final    Specimen collection should occur prior to Sulfasalazine administration due to the potential for falsely depressed results.     Alkaline Phosphatase 05/06/2024 69  34 - 104 U/L Final    Total Protein 05/06/2024 7.7  6.4 - 8.4 g/dL Final    Albumin 05/06/2024 4.6  3.5 - 5.0 g/dL Final    Total Bilirubin 05/06/2024 0.42  0.20 - 1.00 mg/dL Final    Use of this assay is not recommended for patients undergoing treatment with eltrombopag due to the potential for falsely elevated results.  N-acetyl-p-benzoquinone imine (metabolite of Acetaminophen) will generate erroneously low results in samples for patients that have taken an overdose of Acetaminophen.    eGFR 05/06/2024 63  ml/min/1.73sq m Final     Creatinine, Ur 05/08/2024 28.1  Reference range not established. mg/dL Final    Protein Urine Random 05/08/2024 <4  Reference range not established. mg/dL Final    Prot/Creat Ratio, Ur 05/08/2024    Final    Unable to calculate.    Phosphorus 05/06/2024 2.9  2.3 - 4.1 mg/dL Final    PTH 05/06/2024 52.7  12.0 - 88.0 pg/mL Final    Color, UA 05/08/2024 Colorless   Final    Clarity, UA 05/08/2024 Clear   Final    Specific Gravity, UA 05/08/2024 1.009  1.003 - 1.030 Final    pH, UA 05/08/2024 7.0  4.5, 5.0, 5.5, 6.0, 6.5, 7.0, 7.5, 8.0 Final    Leukocytes, UA 05/08/2024 Small (A)  Negative Final    Nitrite, UA 05/08/2024 Negative  Negative Final    Protein, UA 05/08/2024 Negative  Negative mg/dl Final    Glucose, UA 05/08/2024 Negative  Negative mg/dl Final    Ketones, UA 05/08/2024 Negative  Negative mg/dl Final    Urobilinogen, UA 05/08/2024 <2.0  <2.0 mg/dl mg/dl Final    Bilirubin, UA 05/08/2024 Negative  Negative Final    Occult Blood, UA 05/08/2024 Negative  Negative Final    RBC, UA 05/08/2024 None Seen  None Seen, 1-2 /hpf Final    WBC, UA 05/08/2024 1-2  None Seen, 1-2 /hpf Final    Epithelial Cells 05/08/2024 Occasional  None Seen, Occasional /hpf Final    Bacteria, UA 05/08/2024 None Seen  None Seen, Occasional /hpf Final    Vit D, 25-Hydroxy 05/06/2024 81.3  30.0 - 100.0 ng/mL Final    Vitamin D guidelines established by Clinical Guidelines Subcommittee  of the Endocrine Society Task Force, 2011    Deficiency <20ng/ml   Insufficiency 20-30ng/ml   Sufficient  ng/ml     CMV IGG 05/06/2024 9.60 (H)  0.00 - 0.59 U/mL Final                                   Negative          <0.60                                 Equivocal   0.60 - 0.69                                 Positive          >0.69    CMV IgM 05/06/2024 <30.0  0.0 - 29.9 AU/mL Final                                    Negative         <30.0                                  Equivocal  30.0 - 34.9                                  Positive          >34.9  A positive result is generally indicative of acute  infection, reactivation or persistent IgM production.    Scan Result 05/06/2024 SEE WRITTEN REPORT   Final    Hepatitis B Surface Ag 05/06/2024 Non-reactive  Non-Reactive Final    Hepatitis C Ab 05/06/2024 Non-reactive  Non-Reactive Final    Hep B C IgM 05/06/2024 Non-reactive  Non-Reactive Final    Hep B Core Total Ab 05/06/2024 Non-reactive  Non-Reactive Final    Methylmalonic Acid, S 05/06/2024 193  0 - 378 nmol/L Final    CRP 05/06/2024 <1.0  <3.0 mg/L Final    Copper 05/06/2024 98  80 - 158 ug/dL Final                                    Detection Limit = 5    Vitamin B-12 05/06/2024 643  180 - 914 pg/mL Final    Total Counted 05/06/2024 100   Final    Segmented % 05/06/2024 23  % Final    Lymphocytes % 05/06/2024 67  % Final    Monocytes % 05/06/2024 8  4 - 12 % Final    Eosinophils % 05/06/2024 1  0 - 6 % Final    Basophils % 05/06/2024 1  0 - 1 % Final    Absolute Neutrophils 05/06/2024 0.85 (L)  1.81 - 6.82 Thousand/uL Final    Absolute Lymphocytes 05/06/2024 2.47  0.60 - 4.47 Thousand/uL Final    Absolute Monocytes 05/06/2024 0.29  0.00 - 1.22 Thousand/uL Final    Absolute Eosinophils 05/06/2024 0.04  0.00 - 0.61 Thousand/uL Final    Absolute Basophils 05/06/2024 0.04  0.00 - 0.10 Thousand/uL Final    RBC Morphology 05/06/2024 Present   Final    Platelet Estimate 05/06/2024 Adequate  Adequate Final    Eugenie Cells 05/06/2024 Present   Final    Ovalocytes 05/06/2024 Present   Final    Poikilocytes 05/06/2024 Present   Final    Scan Result 05/06/2024 SEE WRITTEN REPORT   Final    EBV VCA IgG 05/06/2024 104.0 (H)  0.0 - 17.9 U/mL Final                                     Negative        <18.0                                   Equivocal 18.0 - 21.9                                   Positive        >21.9    EBV VCA IgM 05/06/2024 <36.0  0.0 - 35.9 U/mL Final                                     Negative        <36.0                                    Equivocal 36.0 - 43.9                                   Positive        >43.9    EBV Nuclear Ag Ab 05/06/2024 308.0 (H)  0.0 - 17.9 U/mL Final                                     Negative        <18.0                                   Equivocal 18.0 - 21.9                                   Positive        >21.9    INTERPRETATION 05/06/2024 Comment   Final                   EBV Interpretation Chart  Key: Antibody Present +    Antibody Absent -  Interpretation             VCA-IgM   VCA-IgG  EBNA-IgG  No previous infection/        -         -         -  Susceptible  Primary infection (new        +         +         -  or recent)  Past Infection               +or-       +         +  See comment below*            +         -         -  *Results indicate infection with EBV at some time   however cannot predict the timing of the infection   since antibodies to EBNA usually develop after   primary infection or, alternatively, approximately   5-10% of patients with EBV never develop antibodies   to EBNA.    Lyme Total Antibodies 05/06/2024 Negative  Negative Final       Imaging:   I reviewed relevant imaging    Please note:  This report has been generated by a voice recognition software system. Therefore there may be syntax, spelling, and/or grammatical errors. Please call if you have any questions.

## 2024-06-03 ENCOUNTER — TELEPHONE (OUTPATIENT)
Age: 59
End: 2024-06-03

## 2024-06-03 NOTE — TELEPHONE ENCOUNTER
----- Message from Dw[facility], DO sent at 6/3/2024 10:19 AM EDT -----  DXA scan showed low bone density (osteopenia) of the lumbar spine. Recommend weight bearing exercise 3-5 times per week. Continue vitamin D

## 2024-06-06 ENCOUNTER — VBI (OUTPATIENT)
Dept: ADMINISTRATIVE | Facility: OTHER | Age: 59
End: 2024-06-06

## 2024-06-06 NOTE — TELEPHONE ENCOUNTER
Upon review of the In Basket request we were able to locate, review, and update the patient chart as requested for the diabetic item. This update will NOT reflect in the HM Activity until a Provider updates the Problem List with the appropriate Diabetic diagnosis code.     Any additional questions or concerns should be emailed to the Practice Liaisons via the appropriate education email address, please do not reply via In Basket.    Thank you  Debi Turner MA   [facility]      Upon review of the In Basket request we were able to locate, review, and update the patient chart as requested for Diabetic Eye Exam.    Any additional questions or concerns should be emailed to the Practice Liaisons via the appropriate education email address, please do not reply via In Basket.    Thank you  Dbei Turner MA   [facility]

## 2024-07-06 DIAGNOSIS — E55.9 VITAMIN D DEFICIENCY: ICD-10-CM

## 2024-07-08 RX ORDER — METHOCARBAMOL 750 MG/1
TABLET ORAL
Qty: 12 CAPSULE | Refills: 1 | Status: SHIPPED | OUTPATIENT
Start: 2024-07-08

## 2024-07-09 ENCOUNTER — APPOINTMENT (OUTPATIENT)
Age: 59
End: 2024-07-09
Payer: COMMERCIAL

## 2024-07-09 DIAGNOSIS — D70.9 NEUTROPENIA, UNSPECIFIED TYPE (HCC): ICD-10-CM

## 2024-07-09 LAB
25(OH)D3 SERPL-MCNC: 79.4 NG/ML (ref 30–100)
ANA SER QL IA: NEGATIVE
EOSINOPHIL # BLD AUTO: 0.08 THOUSAND/UL (ref 0–0.61)
EOSINOPHIL NFR BLD MANUAL: 2 % (ref 0–6)
ERYTHROCYTE [DISTWIDTH] IN BLOOD BY AUTOMATED COUNT: 13.1 % (ref 11.6–15.1)
HCT VFR BLD AUTO: 43.5 % (ref 34.8–46.1)
HGB BLD-MCNC: 13.9 G/DL (ref 11.5–15.4)
LYMPHOCYTES # BLD AUTO: 2.59 THOUSAND/UL (ref 0.6–4.47)
LYMPHOCYTES # BLD AUTO: 62 %
MCH RBC QN AUTO: 26.6 PG (ref 26.8–34.3)
MCHC RBC AUTO-ENTMCNC: 32 G/DL (ref 31.4–37.4)
MCV RBC AUTO: 83 FL (ref 82–98)
MONOCYTES # BLD AUTO: 0.29 THOUSAND/UL (ref 0–1.22)
MONOCYTES NFR BLD AUTO: 7 % (ref 4–12)
NEUTS SEG # BLD: 1.21 THOUSAND/UL (ref 1.81–6.82)
NEUTS SEG NFR BLD AUTO: 29 %
NRBC BLD AUTO-RTO: 0 /100 WBCS
PLATELET # BLD AUTO: 358 THOUSANDS/UL (ref 149–390)
PMV BLD AUTO: 11.5 FL (ref 8.9–12.7)
RBC # BLD AUTO: 5.22 MILLION/UL (ref 3.81–5.12)
TOTAL CELLS COUNTED SPEC: 100
WBC # BLD AUTO: 4.18 THOUSAND/UL (ref 4.31–10.16)

## 2024-07-09 PROCEDURE — 81340 TRB@ GENE REARRANGE AMPLIFY: CPT

## 2024-07-09 PROCEDURE — 85007 BL SMEAR W/DIFF WBC COUNT: CPT

## 2024-07-09 PROCEDURE — 36415 COLL VENOUS BLD VENIPUNCTURE: CPT

## 2024-07-09 PROCEDURE — 86430 RHEUMATOID FACTOR TEST QUAL: CPT

## 2024-07-09 PROCEDURE — 88184 FLOWCYTOMETRY/ TC 1 MARKER: CPT

## 2024-07-09 PROCEDURE — 81342 TRG GENE REARRANGEMENT ANAL: CPT

## 2024-07-09 PROCEDURE — 88185 FLOWCYTOMETRY/TC ADD-ON: CPT | Performed by: PHYSICIAN ASSISTANT

## 2024-07-09 PROCEDURE — 86038 ANTINUCLEAR ANTIBODIES: CPT

## 2024-07-10 LAB — RHEUMATOID FACT SER QL LA: NEGATIVE

## 2024-07-16 LAB
SCAN RESULT: NORMAL

## 2024-07-26 ENCOUNTER — TELEPHONE (OUTPATIENT)
Dept: HEMATOLOGY ONCOLOGY | Facility: CLINIC | Age: 59
End: 2024-07-26

## 2024-07-26 DIAGNOSIS — D70.9 NEUTROPENIA, UNSPECIFIED TYPE (HCC): Primary | ICD-10-CM

## 2024-07-26 NOTE — TELEPHONE ENCOUNTER
Attempted to call patient regarding recent lab results. No answer. LVM. Patient flow cytometry showed few CD34 blasts. These are new compared to prior study. Her most recent CBC shows a very mild leukopenia without other cytopenias.  Peripheral smear showed a reduced ANC however it did not show any blasts.  I think it is reasonable to continue observation and repeat her CBC, peripheral smear and leukemia/lymphoma flow cytometry in approximately 2 weeks as long as she is not having any fever, night sweats, unintentional weight loss, severe fatigue or enlarged lymph nodes. Bone marrow biopsy could also be considered if the patient wishes.  She was advised to call back and let us know which would prefer to do.

## 2024-07-29 ENCOUNTER — TELEPHONE (OUTPATIENT)
Age: 59
End: 2024-07-29

## 2024-07-29 NOTE — TELEPHONE ENCOUNTER
"Pt returned call to YOVANI Turner from 7/26. I reviewed Yue's message as follows:  \"Attempted to call patient regarding recent lab results. No answer. LVM. Patient flow cytometry showed few CD34 blasts. These are new compared to prior study. Her most recent CBC shows a very mild leukopenia without other cytopenias.  Peripheral smear showed a reduced ANC however it did not show any blasts.  I think it is reasonable to continue observation and repeat her CBC, peripheral smear and leukemia/lymphoma flow cytometry in approximately 2 weeks as long as she is not having any fever, night sweats, unintentional weight loss, severe fatigue or enlarged lymph nodes. Bone marrow biopsy could also be considered if the patient wishes.  She was advised to call back and let us know which would prefer to do.       She said she was out of the country and could not return the call until today. She will also be traveling to [place] on [place] the [place] and will like to wait until after she returns to get the bone marrow biopsy if recommended. She will however appreciate Yue's recommendation if she thinks it is pertinent to get it ASAP. She will like a return call with recommendations, but does not have access to her phone while at work. She did say she will return our call as soon as she is able.  "

## 2024-07-30 ENCOUNTER — TELEPHONE (OUTPATIENT)
Dept: HEMATOLOGY ONCOLOGY | Facility: CLINIC | Age: 59
End: 2024-07-30

## 2024-07-30 NOTE — TELEPHONE ENCOUNTER
Called and left vm for patient advising them of what Yue suggested and how the lab orders were in for her if she needed and to have them done 1 wk prior to leaving for her trip. Also gave Hope # if pt had any more questions.

## 2024-08-01 ENCOUNTER — ANNUAL EXAM (OUTPATIENT)
Age: 59
End: 2024-08-01
Payer: COMMERCIAL

## 2024-08-01 VITALS
SYSTOLIC BLOOD PRESSURE: 122 MMHG | WEIGHT: 173 LBS | HEART RATE: 79 BPM | DIASTOLIC BLOOD PRESSURE: 76 MMHG | HEIGHT: 62 IN | BODY MASS INDEX: 31.83 KG/M2 | TEMPERATURE: 97.9 F

## 2024-08-01 DIAGNOSIS — B37.49 CANDIDA INFECTION OF GENITAL REGION: ICD-10-CM

## 2024-08-01 DIAGNOSIS — Z01.411 ENCOUNTER FOR GYNECOLOGICAL EXAMINATION WITH ABNORMAL FINDING: Primary | ICD-10-CM

## 2024-08-01 DIAGNOSIS — Z78.0 ASYMPTOMATIC POSTMENOPAUSAL STATUS: ICD-10-CM

## 2024-08-01 DIAGNOSIS — Z12.31 ENCOUNTER FOR SCREENING MAMMOGRAM FOR MALIGNANT NEOPLASM OF BREAST: ICD-10-CM

## 2024-08-01 PROCEDURE — 3074F SYST BP LT 130 MM HG: CPT | Performed by: NURSE PRACTITIONER

## 2024-08-01 PROCEDURE — 3078F DIAST BP <80 MM HG: CPT | Performed by: NURSE PRACTITIONER

## 2024-08-01 PROCEDURE — 99396 PREV VISIT EST AGE 40-64: CPT | Performed by: NURSE PRACTITIONER

## 2024-08-01 RX ORDER — CLOTRIMAZOLE AND BETAMETHASONE DIPROPIONATE 10; .64 MG/G; MG/G
CREAM TOPICAL 2 TIMES DAILY PRN
Qty: 45 G | Refills: 1 | Status: SHIPPED | OUTPATIENT
Start: 2024-08-01 | End: 2024-08-15

## 2024-08-01 RX ORDER — FLUCONAZOLE 150 MG/1
150 TABLET ORAL ONCE
Qty: 2 TABLET | Refills: 2 | Status: SHIPPED | OUTPATIENT
Start: 2024-08-01 | End: 2024-08-01

## 2024-08-01 NOTE — PROGRESS NOTES
Diagnoses and all orders for this visit:    Encounter for gynecological examination with abnormal finding    Asymptomatic postmenopausal status    Candida infection of genital region  -     fluconazole (DIFLUCAN) 150 mg tablet; Take 1 tablet (150 mg total) by mouth once for 1 dose and repeat in 3 days  -     clotrimazole-betamethasone (LOTRISONE) 1-0.05 % cream; Apply topically 2 (two) times a day as needed (for itching) for up to 14 days    Encounter for screening mammogram for malignant neoplasm of breast  -     Mammo screening bilateral w 3d & cad; Future      Call as needed, encouraged calcium/vit D in her diet, call with any PMB, all questions answered.             Pleasant 59 y.o. postmenopausal female here for annual exam. She denies postmenopausal bleeding. Denies history of abnormal pap smears, last Pap 09/01/2020 nml and HPV neg, a pap was NOT done today. Denies vaginal issues other than chronic itching for the past 2 years. She is diabetic now, Hgb A1C was 7.2 The itching seems more external at times. Antifungals do help. Denies pelvic pain/dyspareunia. Denies postmenopausal issues. Sexually active. Monogamous for over 20 yrs. Colonoscopy due 12/2024. Mammogram 05/30/2024 BR2 normal.      Past Medical History:   Diagnosis Date    Chronic kidney disease     High blood pressure     High cholesterol     Hypertension 14yr ago    Pre-diabetes      Past Surgical History:   Procedure Laterality Date    BREAST CYST EXCISION Left 01/01/1980    neg     Family History   Problem Relation Age of Onset    Diabetes Mother     Heart disease Mother     Alzheimer's disease Father     Hypertension Father     No Known Problems Sister     No Known Problems Sister     No Known Problems Sister     No Known Problems Sister     No Known Problems Daughter     No Known Problems Daughter     No Known Problems Maternal Grandmother     No Known Problems Paternal Grandmother     No Known Problems Maternal Aunt     No Known Problems  Maternal Aunt     No Known Problems Paternal Aunt     Breast cancer Other 66    Breast cancer Other 66    Colon cancer Neg Hx     Ovarian cancer Neg Hx     Uterine cancer Neg Hx     Cervical cancer Neg Hx      Social History     Tobacco Use    Smoking status: Never     Passive exposure: Never    Smokeless tobacco: Never   Vaping Use    Vaping status: Never Used   Substance Use Topics    Alcohol use: Yes     Alcohol/week: 2.0 standard drinks of alcohol     Types: 2 Glasses of wine per week    Drug use: No       Current Outpatient Medications:     clotrimazole-betamethasone (LOTRISONE) 1-0.05 % cream, Apply topically 2 (two) times a day as needed (for itching) for up to 14 days, Disp: 45 g, Rfl: 1    D3-50 1.25 MG (43935 UT) capsule, TAKE 1 CAPSULE (50,000 UNITS TOTAL) BY MOUTH ONCE A WEEK FOR 24 DOSES, Disp: 12 capsule, Rfl: 1    fluconazole (DIFLUCAN) 150 mg tablet, Take 1 tablet (150 mg total) by mouth once for 1 dose and repeat in 3 days, Disp: 2 tablet, Rfl: 2    NIFEdipine ER (ADALAT CC) 60 MG 24 hr tablet, Take 1 tablet (60 mg total) by mouth daily, Disp: 90 tablet, Rfl: 1  Patient Active Problem List    Diagnosis Date Noted    Prediabetes 2024    Abnormal CBC 2024    Hiatal hernia 2022    Flank pain 2022    Mixed hyperlipidemia 2021    Asymptomatic postmenopausal status 2020    Chronic kidney disease, stage II (mild) 10/18/2018    Vitamin D insufficiency 10/18/2018    Mild mitral regurgitation by prior echocardiography 2018    Essential hypertension 2018       No Known Allergies    OB History    Para Term  AB Living   4 3 3   1 3   SAB IAB Ectopic Multiple Live Births   1       3      # Outcome Date GA Lbr Buddy/2nd Weight Sex Type Anes PTL Lv   4 SAB            3 Term            2 Term            1 Term              5 grandchildren and 2 great-grandchildren, oldest daughter is in college doing a major and minor-very proud mom!  Works at  "[employer]    Vitals:    08/01/24 0908   BP: 122/76   BP Location: Left arm   Patient Position: Sitting   Cuff Size: Standard   Pulse: 79   Temp: 97.9 °F (36.6 °C)   Weight: 78.5 kg (173 lb)   Height: 5' 2\" (1.575 m)     Body mass index is 31.64 kg/m².    Review of Systems   Constitutional: Negative for chills, fatigue, fever and unexpected weight change.   Respiratory: Negative for shortness of breath.    Gastrointestinal: Negative for anal bleeding, blood in stool, constipation and diarrhea.   Genitourinary: Negative for difficulty urinating, dysuria and hematuria.     Physical Exam   Constitutional: She appears well-developed and well-nourished. No distress.   HENT: atraumatic, EOMI  Head: Normocephalic.   Neck: Normal range of motion. Neck supple.   Pulmonary: Effort normal.  Breasts: bilateral without masses, skin changes or nipple discharge. Bilaterally soft and warm to touch. No areas of erythema or pain.  Abdominal: Soft.   Pelvic exam was performed with patient supine. No labial fusion. There is no rash, tenderness, lesion or injury on the right labia. There is no rash, tenderness, lesion or injury on the left labia. Urethral meatus does not show any tenderness, inflammation or discharge. Palpation of midline bladder without pain or discomfort. Uterus is not deviated, not enlarged, not fixed and not tender. Cervix exhibits no motion tenderness, no discharge and no friability. Right adnexum displays no mass, no tenderness and no fullness. Left adnexum displays no mass, no tenderness and no fullness. No erythema or tenderness in the vagina. No foreign body in the vagina. No signs of injury around the vagina or anus. Perineum without lesions, signs of injury, erythema or swelling. NO vaginal discharge found.   Lymphadenopathy:        Right: No inguinal adenopathy present.        Left: No inguinal adenopathy present.     "

## 2024-08-01 NOTE — PATIENT INSTRUCTIONS
Patient Education     Lowering Your Risk of Breast Cancer   About this topic   Breast cancer is a serious illness. Breast cancer is when abnormal cells grow and divide more quickly in your breast. These cells form a growth or tumor. The abnormal cells may enter nearby tissue and spread to other parts of the body. It is the type of cancer most often seen in women. Men can have breast cancer, but it is a rare condition.  General   Some things in your life may increase your risk of breast cancer. You may not be able to change some of these. Others you can control.  You are more likely to get breast cancer if you:  Have a mother, sister, or daughter who has had breast cancer  Have used hormones for menopause for more than 5 years  Have had radiation therapy to the breast or chest in the past  Are overweight or do not exercise  Had your first menstrual period before you were 11 years old  Went through menopause after age 53  Have never been pregnant or had your first child after age 35  Have had breast cancer before  Drink alcohol in any form  Have dense breasts  Are older in age  There is no certain way to prevent breast cancer. There are things you can do to lower your chances of having breast cancer.  Keep a healthy weight. Lose weight if you are overweight. Being overweight raises your chances of having breast cancer.  Eat a healthy diet to maintain a healthy weight, such as more fruits, vegetables, and lean cuts of meat. Decrease the amount of saturated fat in your diet.  Exercise. Being active helps you keep a healthy weight.  Limit your alcohol intake or do not drink alcohol. The more alcohol you drink, the higher your risk.  Do not smoke cigarettes. Smoking can increase your risk of many types of cancer.  Breastfeed your baby. This may help protect you. The longer you breastfeed, the more protection you have.  Talk with your doctor about:  Limiting or stopping hormone therapy.  Taking certain drugs to prevent  breast cancer. For women at high risk of having breast cancer, there are a few drugs that may lower your risk.  Surgery to prevent you from having breast cancer if you are very high risk.  When do I need to call the doctor?   Changes in your breasts  A lump or area in your breast that feels different  Discharge from your nipple  Skin on your breast is dimpled or indented  You have questions or concerns about your breasts  Helpful tips   Talk to your doctor about the best kind of breast cancer screening for you.  If you want to do self breast exams, have your doctor show you the right way to do them.  Tell your doctor of any abnormal finding.  Last Reviewed Date   2021-10-04  Consumer Information Use and Disclaimer   This generalized information is a limited summary of diagnosis, treatment, and/or medication information. It is not meant to be comprehensive and should be used as a tool to help the user understand and/or assess potential diagnostic and treatment options. It does NOT include all information about conditions, treatments, medications, side effects, or risks that may apply to a specific patient. It is not intended to be medical advice or a substitute for the medical advice, diagnosis, or treatment of a health care provider based on the health care provider's examination and assessment of a patient’s specific and unique circumstances. Patients must speak with a health care provider for complete information about their health, medical questions, and treatment options, including any risks or benefits regarding use of medications. This information does not endorse any treatments or medications as safe, effective, or approved for treating a specific patient. [employer] and its affiliates disclaim any warranty or liability relating to this information or the use thereof. The use of this information is governed by the Terms of Use, available at  https://www.woltersBollingoBloguwer.com/en/know/clinical-effectiveness-terms   Copyright   Copyright © 2024 [employer], [employer] and its affiliates and/or licensors. All rights reserved.

## 2024-08-20 ENCOUNTER — APPOINTMENT (EMERGENCY)
Dept: RADIOLOGY | Facility: HOSPITAL | Age: 59
End: 2024-08-20
Payer: COMMERCIAL

## 2024-08-20 ENCOUNTER — HOSPITAL ENCOUNTER (EMERGENCY)
Facility: HOSPITAL | Age: 59
Discharge: HOME | End: 2024-08-20
Payer: COMMERCIAL

## 2024-08-20 VITALS
WEIGHT: 174.38 LBS | OXYGEN SATURATION: 100 % | SYSTOLIC BLOOD PRESSURE: 140 MMHG | RESPIRATION RATE: 16 BRPM | HEART RATE: 87 BPM | TEMPERATURE: 98.5 F | DIASTOLIC BLOOD PRESSURE: 95 MMHG | BODY MASS INDEX: 31.9 KG/M2

## 2024-08-20 DIAGNOSIS — R09.A2 GLOBUS SENSATION: Primary | ICD-10-CM

## 2024-08-20 PROCEDURE — 99283 EMERGENCY DEPT VISIT LOW MDM: CPT

## 2024-08-20 PROCEDURE — 99284 EMERGENCY DEPT VISIT MOD MDM: CPT

## 2024-08-20 PROCEDURE — 93005 ELECTROCARDIOGRAM TRACING: CPT

## 2024-08-20 PROCEDURE — 70360 X-RAY EXAM OF NECK: CPT

## 2024-08-20 RX ORDER — ALUMINUM HYDROXIDE, MAGNESIUM HYDROXIDE, SIMETHICONE 400; 400; 40 MG/10ML; MG/10ML; MG/10ML
30 SUSPENSION ORAL
Qty: 355 ML | Refills: 0 | Status: SHIPPED | OUTPATIENT
Start: 2024-08-20

## 2024-08-20 RX ORDER — DIPHENHYDRAMINE HYDROCHLORIDE AND LIDOCAINE HYDROCHLORIDE AND ALUMINUM HYDROXIDE AND MAGNESIUM HYDRO
10 KIT ONCE
Status: COMPLETED | OUTPATIENT
Start: 2024-08-20 | End: 2024-08-20

## 2024-08-20 RX ADMIN — LIDOCAINE HYDROCHLORIDE 10 ML: 20 SOLUTION ORAL; TOPICAL at 20:53

## 2024-08-21 LAB
ATRIAL RATE: 79 BPM
ATRIAL RATE: 80 BPM
P AXIS: 28 DEGREES
P AXIS: 32 DEGREES
PR INTERVAL: 148 MS
PR INTERVAL: 152 MS
QRS AXIS: 40 DEGREES
QRS AXIS: 42 DEGREES
QRSD INTERVAL: 64 MS
QRSD INTERVAL: 64 MS
QT INTERVAL: 380 MS
QT INTERVAL: 382 MS
QTC INTERVAL: 435 MS
QTC INTERVAL: 440 MS
T WAVE AXIS: -10 DEGREES
T WAVE AXIS: -2 DEGREES
VENTRICULAR RATE: 79 BPM
VENTRICULAR RATE: 80 BPM

## 2024-08-21 PROCEDURE — 93010 ELECTROCARDIOGRAM REPORT: CPT | Performed by: INTERNAL MEDICINE

## 2024-08-21 NOTE — DISCHARGE INSTRUCTIONS
Your evaluation is reassuring at this time. I recommend taking maalox over the next few days to help if there is some irritation of the esophagus causing this feeling. I have also sent a referral to GI for evaluation if your symptoms do not improve.     If you notice a change in symptoms such as new chest pain, difficulty breathing, drooling, or other concerns then I recommend returning to the ED for evaluation.

## 2024-08-21 NOTE — ED PROVIDER NOTES
History  Chief Complaint   Patient presents with    Medical Problem     Pt states she feels as if something is stuck in her throat and it wont move and can't clear it. Has had no issues eating or swallowing at this moment      1-year-old female with history of CKD, hypertension, hyperlipidemia, presents emergency department due to a globus sensation in her throat.  She was eating fruit yesterday night.  Today she has had a feeling like something is stuck in her upper throat. When she swallows she feels like there is a lump. She has not had any difficulty eating/drinking. She has no other associated symptoms such as f/c, cp, sob, abd pain, or other complaints.     Prior to Admission Medications   Prescriptions Last Dose Informant Patient Reported? Taking?   D3-50 1.25 MG (52959 UT) capsule  Self No No   Sig: TAKE 1 CAPSULE (50,000 UNITS TOTAL) BY MOUTH ONCE A WEEK FOR 24 DOSES   NIFEdipine ER (ADALAT CC) 60 MG 24 hr tablet  Self No No   Sig: Take 1 tablet (60 mg total) by mouth daily   clotrimazole-betamethasone (LOTRISONE) 1-0.05 % cream   No No   Sig: Apply topically 2 (two) times a day as needed (for itching) for up to 14 days      Facility-Administered Medications: None       Past Medical History:   Diagnosis Date    Chronic kidney disease     High blood pressure     High cholesterol     Hypertension 14yr ago    Pre-diabetes        Past Surgical History:   Procedure Laterality Date    BREAST CYST EXCISION Left 01/01/1980    neg       Family History   Problem Relation Age of Onset    Diabetes Mother     Heart disease Mother     Alzheimer's disease Father     Hypertension Father     No Known Problems Sister     No Known Problems Sister     No Known Problems Sister     No Known Problems Sister     No Known Problems Daughter     No Known Problems Daughter     No Known Problems Maternal Grandmother     No Known Problems Paternal Grandmother     No Known Problems Maternal Aunt     No Known Problems Maternal Aunt     No  Known Problems Paternal Aunt     Breast cancer Other 66    Breast cancer Other 64    Colon cancer Neg Hx     Ovarian cancer Neg Hx     Uterine cancer Neg Hx     Cervical cancer Neg Hx      I have reviewed and agree with the history as documented.    E-Cigarette/Vaping    E-Cigarette Use Never User      E-Cigarette/Vaping Substances    Nicotine No     THC No     CBD No     Flavoring No     Other No     Unknown No      Social History     Tobacco Use    Smoking status: Never     Passive exposure: Never    Smokeless tobacco: Never   Vaping Use    Vaping status: Never Used   Substance Use Topics    Alcohol use: Yes     Alcohol/week: 2.0 standard drinks of alcohol     Types: 2 Glasses of wine per week    Drug use: No       Review of Systems   All other systems reviewed and are negative.      Physical Exam  Physical Exam  Vitals reviewed.   Constitutional:       General: She is not in acute distress.  HENT:      Head: Normocephalic.      Mouth/Throat:      Mouth: Mucous membranes are moist.      Pharynx: Oropharynx is clear. No oropharyngeal exudate or posterior oropharyngeal erythema.   Cardiovascular:      Rate and Rhythm: Normal rate and regular rhythm.   Pulmonary:      Effort: Pulmonary effort is normal.      Breath sounds: Normal breath sounds. No wheezing or rales.   Abdominal:      General: Abdomen is flat.      Tenderness: There is no abdominal tenderness.   Skin:     General: Skin is dry.   Neurological:      General: No focal deficit present.      Mental Status: She is alert.   Psychiatric:         Mood and Affect: Mood normal.         Vital Signs  ED Triage Vitals [08/20/24 2020]   Temperature Pulse Respirations Blood Pressure SpO2   98.5 °F (36.9 °C) 87 16 140/95 100 %      Temp Source Heart Rate Source Patient Position - Orthostatic VS BP Location FiO2 (%)   Temporal Monitor Sitting Left arm --      Pain Score       --           Vitals:    08/20/24 2020   BP: 140/95   Pulse: 87   Patient Position -  Orthostatic VS: Sitting         Visual Acuity      ED Medications  Medications   diphenhydramine, lidocaine, Al/Mg hydroxide, simethicone (Magic Mouthwash) oral solution 10 mL (10 mL Swish & Swallow Given 8/20/24 2053)       Diagnostic Studies  Results Reviewed       None                   XR neck soft tissue   ED Interpretation by Aroldo Goldsmith MD (08/20 2122)   No esophageal foreign body.       Final Result by Nga Galindo MD (08/21 0848)      Unremarkable neck soft tissue radiograph.      Workstation performed: [id number]                    Procedures  ECG 12 Lead Documentation Only    Date/Time: 8/20/2024 10:00 PM    Performed by: Aroldo Goldsmith MD  Authorized by: Aroldo Goldsmith MD    ECG reviewed by me, the ED Provider: yes    Patient location:  ED  Previous ECG:     Previous ECG:  Unavailable  Interpretation:     Interpretation: normal    Rate:     ECG rate assessment: normal    Rhythm:     Rhythm: sinus rhythm    Ectopy:     Ectopy: none    QRS:     QRS axis:  Normal    QRS intervals:  Normal  Conduction:     Conduction: normal    ST segments:     ST segments:  Normal  T waves:     T waves: normal             ED Course                                 SBIRT 22yo+      Flowsheet Row Most Recent Value   Initial Alcohol Screen: US AUDIT-C     1. How often do you have a drink containing alcohol? 0 Filed at: 08/20/2024 2046   2. How many drinks containing alcohol do you have on a typical day you are drinking?  0 Filed at: 08/20/2024 2046   3b. FEMALE Any Age, or MALE 65+: How often do you have 4 or more drinks on one occassion? 0 Filed at: 08/20/2024 2046   Audit-C Score 0 Filed at: 08/20/2024 2046   RAMU: How many times in the past year have you...    Used an illegal drug or used a prescription medication for non-medical reasons? Never Filed at: 08/20/2024 2046                      Medical Decision Making  59F presenting to the ED with globus sensation. Obtained xrays to evaluate for retained foreign  body. EKG performed reassuring. Xray read by myself as normal. Unclear cause of symptoms, patient was eating acidic fruit (pineapple) and may have some erosion of esophagus. No other associated symptoms to suggest acs or other organ involvement at this time. Could also be early viral syndrome. Ultimately patient appropriate for dicharge with symptom monitoring and return with any new findings such as drooling, difficulty swallowing, chest pain, sob, lightheadedness.     Amount and/or Complexity of Data Reviewed  Radiology: ordered and independent interpretation performed.    Risk  OTC drugs.  Prescription drug management.                 Disposition  Final diagnoses:   Globus sensation     Time reflects when diagnosis was documented in both MDM as applicable and the Disposition within this note       Time User Action Codes Description Comment    8/20/2024 10:21 PM AgusAroldo Add [R09.A2] Globus sensation           ED Disposition       ED Disposition   Discharge    Condition   Stable    Date/Time   Tue Aug 20, 2024 2221    Comment   Fartun Hurley discharge to home/self care.                   Follow-up Information    None         Discharge Medication List as of 8/20/2024 10:23 PM        START taking these medications    Details   aluminum-magnesium hydroxide-simethicone (MAALOX) 200-200-20 MG/5ML SUSP Take 30 mL by mouth 4 (four) times a day (before meals and at bedtime), Starting Tue 8/20/2024, Normal           CONTINUE these medications which have NOT CHANGED    Details   clotrimazole-betamethasone (LOTRISONE) 1-0.05 % cream Apply topically 2 (two) times a day as needed (for itching) for up to 14 days, Starting u 8/1/2024, Until u 8/15/2024 at 2359, Normal      D3-50 1.25 MG (82302 UT) capsule TAKE 1 CAPSULE (50,000 UNITS TOTAL) BY MOUTH ONCE A WEEK FOR 24 DOSES, Normal      NIFEdipine ER (ADALAT CC) 60 MG 24 hr tablet Take 1 tablet (60 mg total) by mouth daily, Starting Sun 4/28/2024, Normal                  PDMP Review       None            ED Provider  Electronically Signed by             Aroldo Goldsmith MD  08/22/24 2336

## 2024-09-05 ENCOUNTER — TELEPHONE (OUTPATIENT)
Dept: HEMATOLOGY ONCOLOGY | Facility: CLINIC | Age: 59
End: 2024-09-05

## 2024-09-05 NOTE — TELEPHONE ENCOUNTER
Left vm for pt and sent mychart msg to pt reminding them they need to have labs done prior to upcoming appt.

## 2024-09-10 ENCOUNTER — OFFICE VISIT (OUTPATIENT)
Age: 59
End: 2024-09-10
Payer: COMMERCIAL

## 2024-09-10 ENCOUNTER — APPOINTMENT (OUTPATIENT)
Age: 59
End: 2024-09-10
Payer: COMMERCIAL

## 2024-09-10 VITALS
HEIGHT: 62 IN | SYSTOLIC BLOOD PRESSURE: 130 MMHG | DIASTOLIC BLOOD PRESSURE: 90 MMHG | TEMPERATURE: 97.6 F | OXYGEN SATURATION: 98 % | HEART RATE: 79 BPM | WEIGHT: 173.8 LBS | RESPIRATION RATE: 18 BRPM | BODY MASS INDEX: 31.98 KG/M2

## 2024-09-10 DIAGNOSIS — G89.29 CHRONIC PAIN OF RIGHT KNEE: ICD-10-CM

## 2024-09-10 DIAGNOSIS — M25.561 CHRONIC PAIN OF RIGHT KNEE: Primary | ICD-10-CM

## 2024-09-10 DIAGNOSIS — G89.29 CHRONIC PAIN OF RIGHT KNEE: Primary | ICD-10-CM

## 2024-09-10 DIAGNOSIS — D70.9 NEUTROPENIA, UNSPECIFIED TYPE (HCC): ICD-10-CM

## 2024-09-10 DIAGNOSIS — M25.561 CHRONIC PAIN OF RIGHT KNEE: ICD-10-CM

## 2024-09-10 LAB
ANISOCYTOSIS BLD QL SMEAR: PRESENT
BASOPHILS # BLD AUTO: 0.1 THOUSAND/UL (ref 0–0.1)
BASOPHILS NFR MAR MANUAL: 2 % (ref 0–1)
EOSINOPHIL # BLD AUTO: 0.19 THOUSAND/UL (ref 0–0.61)
EOSINOPHIL NFR BLD MANUAL: 4 % (ref 0–6)
ERYTHROCYTE [DISTWIDTH] IN BLOOD BY AUTOMATED COUNT: 13.3 % (ref 11.6–15.1)
HCT VFR BLD AUTO: 41.7 % (ref 34.8–46.1)
HGB BLD-MCNC: 13.3 G/DL (ref 11.5–15.4)
LYMPHOCYTES # BLD AUTO: 3.14 THOUSAND/UL (ref 0.6–4.47)
LYMPHOCYTES # BLD AUTO: 57 %
MCH RBC QN AUTO: 26.7 PG (ref 26.8–34.3)
MCHC RBC AUTO-ENTMCNC: 31.9 G/DL (ref 31.4–37.4)
MCV RBC AUTO: 84 FL (ref 82–98)
METAMYELOCYTES # BLD MANUAL: 0.05 THOUSAND/UL (ref 0–0.1)
METAMYELOCYTES NFR BLD MANUAL: 1 % (ref 0–1)
MONOCYTES # BLD AUTO: 0.14 THOUSAND/UL (ref 0–1.22)
MONOCYTES NFR BLD AUTO: 3 % (ref 4–12)
MYELOCYTES # BLD MANUAL: 0.05 THOUSAND/UL (ref 0–0.1)
MYELOCYTES NFR BLD MANUAL: 1 % (ref 0–1)
NEUTS BAND NFR BLD MANUAL: 3 % (ref 0–8)
NEUTS SEG # BLD: 1.09 THOUSAND/UL (ref 1.81–6.82)
NEUTS SEG NFR BLD AUTO: 20 %
NRBC BLD AUTO-RTO: 0 /100 WBCS
PLATELET # BLD AUTO: 378 THOUSANDS/UL (ref 149–390)
PLATELET BLD QL SMEAR: ADEQUATE
PMV BLD AUTO: 10.8 FL (ref 8.9–12.7)
POIKILOCYTOSIS BLD QL SMEAR: PRESENT
POLYCHROMASIA BLD QL SMEAR: PRESENT
RBC # BLD AUTO: 4.98 MILLION/UL (ref 3.81–5.12)
RBC MORPH BLD: PRESENT
TOTAL CELLS COUNTED SPEC: 100
VARIANT LYMPHS # BLD AUTO: 9 % (ref 0–0)
WBC # BLD AUTO: 4.75 THOUSAND/UL (ref 4.31–10.16)

## 2024-09-10 PROCEDURE — 73562 X-RAY EXAM OF KNEE 3: CPT

## 2024-09-10 PROCEDURE — 99214 OFFICE O/P EST MOD 30 MIN: CPT | Performed by: FAMILY MEDICINE

## 2024-09-10 PROCEDURE — 85007 BL SMEAR W/DIFF WBC COUNT: CPT

## 2024-09-10 PROCEDURE — 88185 FLOWCYTOMETRY/TC ADD-ON: CPT | Performed by: PHYSICIAN ASSISTANT

## 2024-09-10 PROCEDURE — 88184 FLOWCYTOMETRY/ TC 1 MARKER: CPT

## 2024-09-10 PROCEDURE — 36415 COLL VENOUS BLD VENIPUNCTURE: CPT

## 2024-09-10 RX ORDER — MELOXICAM 15 MG/1
15 TABLET ORAL DAILY
COMMUNITY
Start: 2024-08-16

## 2024-09-10 RX ORDER — PREDNISONE 20 MG/1
TABLET ORAL
COMMUNITY
Start: 2024-08-16 | End: 2024-09-10

## 2024-09-10 RX ORDER — FLUCONAZOLE 150 MG/1
TABLET ORAL
COMMUNITY
Start: 2024-08-01 | End: 2024-09-10

## 2024-09-10 NOTE — PROGRESS NOTES
[facility]  Ambulatory Office Visit     PATIENT INFORMATION   Name: Fartun Hurley   YOB: 1965   MRN: [id number]  Encounter Provider: Andres Hicks MD    Encounter Date: 9/10/2024    ASSESSMENT & PLAN     Assessment & Plan  Chronic pain of right knee  Started around July when on vacation, walking a lot. Has been on her feet a lot. Did a lot of walking again in [place] for work. Went to PCP in [place] and was prescribed prednisone for 3 days and meloxicam prn, without much improvement.   Discussed OTC management of with pain, including Tylenol up to 1300 mg every 8 hours, turmeric 500 mg 3 times a day, may continue meloxicam as needed-take with food, Voltaren topical up to 4 times a day.  Follow-up with x-ray of right knee  Referral in place for physical therapy and Ortho    Orders:    XR knee 3 vw right non injury; Future    Ambulatory Referral to Orthopedic Surgery; Future    Ambulatory Referral to Physical Therapy; Future         HEALTH MAINTENANCE     Immunization History   Administered Date(s) Administered    COVID-19 PFIZER VACCINE 0.3 ML IM 05/30/2021, 06/20/2021, 12/22/2021    INFLUENZA 08/31/2017, 01/01/2021, 01/01/2021    Influenza Quadrivalent Preservative Free 3 years and older IM 12/22/2021     Pap smear:09/01/2020  Mammogram:05/30/2024   Colonoscopy:12/13/2014 12/13/2014  Cologuard:Not on file   DEXA scan:05/30/2024      FOLLOW UP   Return for has a scheduled follow up apt.    CURRENT MEDICATIONS     Current Outpatient Medications:     aluminum-magnesium hydroxide-simethicone (MAALOX) 200-200-20 MG/5ML SUSP, Take 30 mL by mouth 4 (four) times a day (before meals and at bedtime), Disp: 355 mL, Rfl: 0    clotrimazole-betamethasone (LOTRISONE) 1-0.05 % cream, Apply topically 2 (two) times a day as needed (for itching) for up to 14 days, Disp: 45 g, Rfl: 1    D3-50 1.25 MG (11784 UT) capsule, TAKE 1 CAPSULE (50,000 UNITS TOTAL) BY MOUTH ONCE A WEEK FOR 24 DOSES, Disp: 12  "capsule, Rfl: 1    meloxicam (MOBIC) 15 mg tablet, Take 15 mg by mouth daily, Disp: , Rfl:     NIFEdipine ER (ADALAT CC) 60 MG 24 hr tablet, Take 1 tablet (60 mg total) by mouth daily, Disp: 90 tablet, Rfl: 1      CHIEF COMPLIANT     Chief Complaint   Patient presents with    Knee Pain        HISTORY OF PRESENT ILLNESS    History of Present Illness         Fartun Hurley is a 59 y.o. female who presents with knee pain involving the right knee. Onset was gradual, starting about 8 week ago. Inciting event: A lot of walking. Current symptoms include: A bit of swelling. Pain is aggravated by any weight bearing.       Review of Systems    PAST MEDICAL & SURGICAL HISTORY     Past Medical History:   Diagnosis Date    Chronic kidney disease     High blood pressure     High cholesterol     Hypertension 14yr ago    Pre-diabetes      Past Surgical History:   Procedure Laterality Date    BREAST CYST EXCISION Left 01/01/1980    neg       OBJECTIVES      /90 (BP Location: Left arm, Patient Position: Sitting, Cuff Size: Large)   Pulse 79   Temp 97.6 °F (36.4 °C) (Tympanic)   Resp 18   Ht 5' 2\" (1.575 m)   Wt 78.8 kg (173 lb 12.8 oz)   SpO2 98%   BMI 31.79 kg/m²    Physical Exam  Vitals reviewed.   Constitutional:       General: She is not in acute distress.     Appearance: Normal appearance. She is not ill-appearing, toxic-appearing or diaphoretic.   HENT:      Head: Normocephalic and atraumatic.      Right Ear: External ear normal.      Left Ear: External ear normal.      Nose: No congestion or rhinorrhea.   Eyes:      General: No scleral icterus.        Right eye: No discharge.         Left eye: No discharge.      Conjunctiva/sclera: Conjunctivae normal.   Cardiovascular:      Rate and Rhythm: Normal rate.   Pulmonary:      Effort: Pulmonary effort is normal. No respiratory distress.   Musculoskeletal:         General: No swelling, tenderness, deformity or signs of injury.      Right lower leg: No edema.     "  Left lower leg: No edema.      Comments: Of the right knee.   Neurological:      General: No focal deficit present.      Mental Status: She is alert and oriented to person, place, and time.   Psychiatric:         Mood and Affect: Mood normal.         Behavior: Behavior normal.         Thought Content: Thought content normal.           Andres Hicks MD  Family Medicine Physician   [facility]      Administrative Statements

## 2024-09-13 LAB — SCAN RESULT: NORMAL

## 2024-09-18 ENCOUNTER — OFFICE VISIT (OUTPATIENT)
Dept: HEMATOLOGY ONCOLOGY | Facility: CLINIC | Age: 59
End: 2024-09-18
Payer: COMMERCIAL

## 2024-09-18 VITALS
DIASTOLIC BLOOD PRESSURE: 82 MMHG | OXYGEN SATURATION: 99 % | BODY MASS INDEX: 31.83 KG/M2 | HEIGHT: 62 IN | HEART RATE: 80 BPM | WEIGHT: 173 LBS | TEMPERATURE: 97 F | SYSTOLIC BLOOD PRESSURE: 128 MMHG | RESPIRATION RATE: 18 BRPM

## 2024-09-18 DIAGNOSIS — D72.820 LYMPHOCYTOSIS: ICD-10-CM

## 2024-09-18 DIAGNOSIS — D70.9 NEUTROPENIA, UNSPECIFIED TYPE (HCC): Primary | ICD-10-CM

## 2024-09-18 PROCEDURE — 99213 OFFICE O/P EST LOW 20 MIN: CPT | Performed by: PHYSICIAN ASSISTANT

## 2024-09-18 NOTE — PROGRESS NOTES
[facility]AT[facility] [address] [address]  Hematology Ambulatory Follow-Up  Fartun Hurlye, 1965, [id number]  9/18/2024      Assessment and Plan   1. Neutropenia, unspecified type (HCC)  2. Lymphocytosis  - CBC and differential; Standing  - Leukemia/Lymphoma flow cytometry; Future  - Peripheral Smear; Standing    59-year-old female who is seen in follow-up today for chronic neutropenia and relative lymphocytosis which has been present since at least 2018.  Patient has never had any complications related to her neutropenia.  Her initial workup showed likely past exposure to EBV/CMV.  HIV and hepatitis C were negative.  Nutritional studies also negative.Her initial flow cytometry showed T cells with minor immunophenotypic alterations (4%) however gene arrangement studies were negative favoring reactive process.  We repeated her flow cytometry 07/2024 in 09/2024 which showed minimal but persistent CD3 4 positive myeloblasts with most recent flow showing minimally increased CD34 +myeloblast 0.4% total. BCRABL FISH neg.     Most recent labs --> WBC 4.75, hemoglobin 13.3, platelet 378,000.  Peripheral smear showed 2% basophils, 9% atypical lymphocytes, 1% metamyelocytes, 1% myelocytes, ANC 1.09.  No peripheral blasts were identified.  Patient is having chronic right knee pain x months, was on steroids 2 weeks prior to blood draw.  She denies any constitutional symptoms.  Her physical exam today is unremarkable.    Discussion/Plan   She has minimal, although persistent, present of CD34+ myeloblast. Patient was educated that myeloblast can be seen both in reactive and neoplastic myeloid conditions.  Bone marrow biopsy is recommended to rule out MPN and early leukemia.  Patient declined and opted for continued observation of blood counts.   Will will monitor blood counts closely with CBC every 4 weeks and plan to repeat peripheral  flow cytometry in next 6 to 8 weeks.  Patient should defer peripheral flow cytometry if she is on oral steroids as these will alter the results.  RTC 3m   She was advised to call our office right away if she develops any unexplained fevers, significant fatigue, night sweats, lymphadenopathy, shortness of breath, lightheadedness, dizziness, lymphadenopathy or frequent infections.     Patient voiced agreement and understanding to the above.   Patient advised to call the Hematology/Oncology office with any questions and concerns regarding the above.    Barrier(s) to care: None  The patient is able to self care.    Yue Mccormack PA-C   Medical Oncology/Hematology  [facility]    Subjective     Chief Complaint   Patient presents with    Follow-up       History of present illness: : 59-year-old female with past medical history of HTN, type 2 diabetes, CKD stage III, HLD who has been referred by her PCP for evaluation of lymphocytosis.     On chart review, patient has had a relative lymphocytosis, leukopenia and nuetropenia since at least 2018 with lymphocytes averaging around 49-63%.  Her highest absolute lymphocyte count was 2.8.  Her most recent CBC from 02/2024 showed mild leukopenia with WBC 3.92.  White blood cell differential showed neutropenia with ANC 0.82, relative lymphocytosis with lymphocyte 63%, ALC 2.53, and monocytosis 14%.  Her hemoglobin and platelet count were normal.     Patient endorses some mild weight loss (unable to quantify) a couple months ago however has regained that weight back.  She denies unexplained fevers, drenching night sweats, significant fatigue or lymphadenopathy.  No history of chronic infections such as HIV, hepatitis or any known autoimmune disease. She had HIV testing in 2020, reports monogamous relationship. She has no personal history of malignancy.  Does not have malabsorption disorder or history of bariatric surgery.  No tick bites.  Patient admits  "to social alcohol use.  Denies tobacco or drug use. Works as a  for [email or web address].  Great maternal aunt has history of breast cancer, no other family history of cancer.  She is up-to-date on her mammogram.  Last colonoscopy in 2014 was reportedly normal.  Cologuard normal in 2019. She travels out of the country often     She was recently diagnosed with CKD stage III which is possibly due to her hypertension/diabetes.       05/2024:   WBC 3.68, hemoglobin 13.0, platelets 376,000. Peripheral smear shows ANC 0.85  - Leukemia/lymphoma flow cytometry shows T-cell with minor immuno phenotypic alterations including a small subset  (4%) of TRC TdT positive T cells with no expression of both CD4 and CD8. Probable benign/reactive process. no abnormal B-cell population identified.   -Abdominal US normal spleen and liver.  -EBV IgG positive, EBC Ab+. CMV IgG positive. Chronic hepatitis panel negative. Lyme neg. HIV neg 2020. -No high risk behavior.  -BCRABL neg   -B12 643, MMA, copper, vitam D all normal   -CRP <1.0     07/09/2024: peripheral flow cytometry: relatively decreased neutrophils with rare CD3 4 positive blasts  T cell gamma and beta gene rearrangement neg     09/2024: peripheral flow cytometry: Minimally increased CD3 4 positive myeloblast (0.4% of total).      WBC 4.75, hemoglobin 13.3, platelet 378,000.  Peripheral smear showed 2% basophils, 9% atypical lymphocytes, 1% metamyelocytes, 1% myelocytes, ANC 1.09.  No peripheral blasts were identified.  Patient was on steroids 2 weeks prior to blood draw.    09/18/24 :  No results found for: \"IRON\", \"TIBC\", \"FERRITIN\"  Lab Results   Component Value Date    WBC 4.75 09/10/2024    HGB 13.3 09/10/2024    HCT 41.7 09/10/2024    MCV 84 09/10/2024     09/10/2024       Interval history:  She is having chronic right knee pain for past 2 months. She Is having pain every day and has associated. Over past 2 days pain has been slighty better.   She is " having dysphagia, evening GI tomorrow.     Review of Systems   All other systems reviewed and are negative.      Patient Active Problem List   Diagnosis    Essential hypertension    Mild mitral regurgitation by prior echocardiography    Chronic kidney disease, stage II (mild)    Vitamin D insufficiency    Asymptomatic postmenopausal status    Mixed hyperlipidemia    Hiatal hernia    Flank pain    Prediabetes    Abnormal CBC     Past Medical History:   Diagnosis Date    Chronic kidney disease     High blood pressure     High cholesterol     Hypertension 14yr ago    Pre-diabetes      Past Surgical History:   Procedure Laterality Date    BREAST CYST EXCISION Left 01/01/1980    neg     Family History   Problem Relation Age of Onset    Diabetes Mother     Heart disease Mother     Alzheimer's disease Father     Hypertension Father     No Known Problems Sister     No Known Problems Sister     No Known Problems Sister     No Known Problems Sister     No Known Problems Daughter     No Known Problems Daughter     No Known Problems Maternal Grandmother     No Known Problems Paternal Grandmother     No Known Problems Maternal Aunt     No Known Problems Maternal Aunt     No Known Problems Paternal Aunt     Breast cancer Other 66    Breast cancer Other 66    Colon cancer Neg Hx     Ovarian cancer Neg Hx     Uterine cancer Neg Hx     Cervical cancer Neg Hx      Social History     Socioeconomic History    Marital status: /Civil Union     Spouse name: Not on file    Number of children: Not on file    Years of education: Not on file    Highest education level: Not on file   Occupational History    Not on file   Tobacco Use    Smoking status: Never     Passive exposure: Never    Smokeless tobacco: Never   Vaping Use    Vaping status: Never Used   Substance and Sexual Activity    Alcohol use: Yes     Alcohol/week: 2.0 standard drinks of alcohol     Types: 2 Glasses of wine per week    Drug use: No    Sexual activity: Yes      Partners: Male     Birth control/protection: Male Sterilization   Other Topics Concern    Not on file   Social History Narrative    Not on file     Social Determinants of Health     Financial Resource Strain: Not on file   Food Insecurity: Not on file   Transportation Needs: Not on file   Physical Activity: Inactive (5/28/2024)    Exercise Vital Sign     Days of Exercise per Week: 0 days     Minutes of Exercise per Session: 0 min   Stress: No Stress Concern Present (3/18/2022)    [employer] - Occupational Stress Questionnaire     Feeling of Stress : Not at all   Social Connections: Not on file   Intimate Partner Violence: Not on file   Housing Stability: Not on file       Current Outpatient Medications:     aluminum-magnesium hydroxide-simethicone (MAALOX) 200-200-20 MG/5ML SUSP, Take 30 mL by mouth 4 (four) times a day (before meals and at bedtime), Disp: 355 mL, Rfl: 0    clotrimazole-betamethasone (LOTRISONE) 1-0.05 % cream, Apply topically 2 (two) times a day as needed (for itching) for up to 14 days, Disp: 45 g, Rfl: 1    D3-50 1.25 MG (19687 UT) capsule, TAKE 1 CAPSULE (50,000 UNITS TOTAL) BY MOUTH ONCE A WEEK FOR 24 DOSES, Disp: 12 capsule, Rfl: 1    meloxicam (MOBIC) 15 mg tablet, Take 15 mg by mouth daily, Disp: , Rfl:     NIFEdipine ER (ADALAT CC) 60 MG 24 hr tablet, Take 1 tablet (60 mg total) by mouth daily, Disp: 90 tablet, Rfl: 1  No Known Allergies    Objective   There were no vitals taken for this visit.   Physical Exam  Vitals reviewed.   HENT:      Head: Normocephalic.   Cardiovascular:      Rate and Rhythm: Normal rate and regular rhythm.   Pulmonary:      Effort: Pulmonary effort is normal.      Breath sounds: Normal breath sounds.   Abdominal:      Palpations: Abdomen is soft.      Tenderness: There is no abdominal tenderness.   Musculoskeletal:      Cervical back: Neck supple.   Lymphadenopathy:      Cervical: No cervical adenopathy.      Upper Body:      Right  upper body: No supraclavicular or axillary adenopathy.      Left upper body: No supraclavicular or axillary adenopathy.   Skin:     Findings: No rash.   Neurological:      Mental Status: She is alert.         Result Review  Labs:  Appointment on 09/10/2024   Component Date Value Ref Range Status    WBC 09/10/2024 4.75  4.31 - 10.16 Thousand/uL Final    RBC 09/10/2024 4.98  3.81 - 5.12 Million/uL Final    Hemoglobin 09/10/2024 13.3  11.5 - 15.4 g/dL Final    Hematocrit 09/10/2024 41.7  34.8 - 46.1 % Final    MCV 09/10/2024 84  82 - 98 fL Final    MCH 09/10/2024 26.7 (L)  26.8 - 34.3 pg Final    MCHC 09/10/2024 31.9  31.4 - 37.4 g/dL Final    RDW 09/10/2024 13.3  11.6 - 15.1 % Final    MPV 09/10/2024 10.8  8.9 - 12.7 fL Final    Platelets 09/10/2024 378  149 - 390 Thousands/uL Final    nRBC 09/10/2024 0  /100 WBCs Final    Total Counted 09/10/2024 100   Final    Segmented % 09/10/2024 20  % Final    Bands % 09/10/2024 3  0 - 8 % Final    Lymphocytes % 09/10/2024 57  % Final    Monocytes % 09/10/2024 3 (L)  4 - 12 % Final    Eosinophils % 09/10/2024 4  0 - 6 % Final    Basophils % 09/10/2024 2 (H)  0 - 1 % Final    Metamyelocytes % 09/10/2024 1  0 - 1 % Final    Myelocytes % 09/10/2024 1  0 - 1 % Final    Atypical Lymphocytes % 09/10/2024 9 (H)  0 - 0 % Final    Absolute Neutrophils 09/10/2024 1.09 (L)  1.81 - 6.82 Thousand/uL Final    Absolute Lymphocytes 09/10/2024 3.14  0.60 - 4.47 Thousand/uL Final    Absolute Monocytes 09/10/2024 0.14  0.00 - 1.22 Thousand/uL Final    Absolute Eosinophils 09/10/2024 0.19  0.00 - 0.61 Thousand/uL Final    Absolute Basophils 09/10/2024 0.10  0.00 - 0.10 Thousand/uL Final    Absolute Metamyelocytes 09/10/2024 0.05  0.00 - 0.10 Thousand/uL Final    Absolute Myelocytes 09/10/2024 0.05  0.00 - 0.10 Thousand/uL Final    RBC Morphology 09/10/2024 Present   Final    Platelet Estimate 09/10/2024 Adequate  Adequate Final    Anisocytosis 09/10/2024 Present   Final    Poikilocytes 09/10/2024  Present   Final    Polychromasia 09/10/2024 Present   Final    Scan Result 09/10/2024 SEE WRITTEN REPORT   Final   Admission on 08/20/2024, Discharged on 08/20/2024   Component Date Value Ref Range Status    Ventricular Rate 08/20/2024 80  BPM Final    Atrial Rate 08/20/2024 80  BPM Final    CA Interval 08/20/2024 148  ms Final    QRSD Interval 08/20/2024 64  ms Final    QT Interval 08/20/2024 382  ms Final    QTC Interval 08/20/2024 440  ms Final    P Axis 08/20/2024 32  degrees Final    QRS Axis 08/20/2024 42  degrees Final    T Wave Axis 08/20/2024 -2  degrees Final    Ventricular Rate 08/20/2024 79  BPM Final    Atrial Rate 08/20/2024 79  BPM Final    CA Interval 08/20/2024 152  ms Final    QRSD Interval 08/20/2024 64  ms Final    QT Interval 08/20/2024 380  ms Final    QTC Interval 08/20/2024 435  ms Final    P Axis 08/20/2024 28  degrees Final    QRS Axis 08/20/2024 40  degrees Final    T Wave Axis 08/20/2024 -10  degrees Final       Imaging:   I reviewed relevant imaging    Please note:  This report has been generated by a voice recognition software system. Therefore there may be syntax, spelling, and/or grammatical errors. Please call if you have any questions.

## 2024-09-19 ENCOUNTER — CONSULT (OUTPATIENT)
Age: 59
End: 2024-09-19
Payer: COMMERCIAL

## 2024-09-19 ENCOUNTER — PREP FOR PROCEDURE (OUTPATIENT)
Age: 59
End: 2024-09-19

## 2024-09-19 VITALS
DIASTOLIC BLOOD PRESSURE: 80 MMHG | BODY MASS INDEX: 31.28 KG/M2 | WEIGHT: 170 LBS | HEART RATE: 91 BPM | SYSTOLIC BLOOD PRESSURE: 140 MMHG | OXYGEN SATURATION: 97 % | HEIGHT: 62 IN

## 2024-09-19 DIAGNOSIS — Z12.11 SCREENING FOR COLON CANCER: ICD-10-CM

## 2024-09-19 DIAGNOSIS — K21.9 GASTROESOPHAGEAL REFLUX DISEASE WITHOUT ESOPHAGITIS: Primary | ICD-10-CM

## 2024-09-19 DIAGNOSIS — R09.A2 GLOBUS SENSATION: ICD-10-CM

## 2024-09-19 PROCEDURE — 99204 OFFICE O/P NEW MOD 45 MIN: CPT | Performed by: INTERNAL MEDICINE

## 2024-09-19 NOTE — PATIENT INSTRUCTIONS
Scheduled date of EGD/colonoscopy (as of today): 9/23/24  Physician performing EGD/colonoscopy: Barbara  Location of EGD/colonoscopy: [facility]  Desired bowel prep reviewed with patient: Miralax  Instructions reviewed with patient by: Valerie CAMACHO  Clearances:

## 2024-09-19 NOTE — PROGRESS NOTES
[facility] Gastroenterology Specialists - Outpatient Note  Fartun Hurley 59 y.o. female MRN: [id number]  Encounter: [id number]      ASSESSMENT AND PLAN:    Fartun Hurley is a 59 y.o. old pleasant female with PMH of vitamin D deficiency, prediabetes, hyperlipidemia who presents for globus, possible dysphagia, GERD and colon cancer screening    Globus  Possible GERD-patient with feeling of food stuck in throat ever since 8/20 when she had a bowl of fruit.  This sensation has occurred a total of 4-5 times and occurs without food.  The food she does eat and swallow has no difficulty going down the esophagus and has no pain.  She has also been having increased heartburn over the past month.  Unclear if her globus is from the heartburn versus possible esophageal irritation she received from some food  Start omeprazole 40 mg daily  Plan for EGD  GERD lifestyle changes discussed, including avoidance of trigger foods (potential foods include coffee, caffeine, chocolate, mint, tomato-based products, spicy foods, fatty foods), avoid tight fitting clothing, elevated head of bed 30 degrees, avoid eating 2-3 hours prior to bedtime, weight loss, avoid alcohol, avoid tobacco use.    Colon cancer screening-last colonoscopy 2014  Plan for colonoscopy          1. Globus sensation    - Ambulatory Referral to Gastroenterology    ______________________________________________________________________    SUBJECTIVE: Patient went to the ED on 8/20 after feeling like food got stuck in her throat.  She reports night before she ate a bowl of fruit and specifically had a significant amount of pineapple.  Ever since then she feels like she intermittently has this feeling of lump in her throat and feels like there is food stuck in there.  This occurs without food and has occurred a total of 4-5 times.  She also ports she feels like she is getting intermittent heartburn for the past month described as burning in the substernal  region in the lower area.  No nausea vomiting abdominal pain.  No weight loss fevers chills.  No changes in bowel habits.  No previous EGD.  Last colonoscopy 10 years ago was normal per patient.  No family history of GI cancer.  No abdominal surgeries.        I reviewed prior external notes    I reviewed previous lab results and images      REVIEW OF SYSTEMS:     REVIEW OF ALL OTHER SYSTEMS IS OTHERWISE NEGATIVE.      Historical Information   Past Medical History:   Diagnosis Date    Chronic kidney disease     High blood pressure     High cholesterol     Hypertension 14yr ago    Pre-diabetes      Past Surgical History:   Procedure Laterality Date    BREAST CYST EXCISION Left 01/01/1980    neg     Social History   Social History     Substance and Sexual Activity   Alcohol Use Yes    Alcohol/week: 2.0 standard drinks of alcohol    Types: 2 Glasses of wine per week     Social History     Substance and Sexual Activity   Drug Use No     Social History     Tobacco Use   Smoking Status Never    Passive exposure: Never   Smokeless Tobacco Never     Family History   Problem Relation Age of Onset    Diabetes Mother     Heart disease Mother     Alzheimer's disease Father     Hypertension Father     No Known Problems Sister     No Known Problems Sister     No Known Problems Sister     No Known Problems Sister     No Known Problems Daughter     No Known Problems Daughter     No Known Problems Maternal Grandmother     No Known Problems Paternal Grandmother     No Known Problems Maternal Aunt     No Known Problems Maternal Aunt     No Known Problems Paternal Aunt     Breast cancer Other 66    Breast cancer Other 66    Colon cancer Neg Hx     Ovarian cancer Neg Hx     Uterine cancer Neg Hx     Cervical cancer Neg Hx        Meds/Allergies       Current Outpatient Medications:     aluminum-magnesium hydroxide-simethicone (MAALOX) 200-200-20 MG/5ML SUSP    D3-50 1.25 MG (40082 UT) capsule    NIFEdipine ER (ADALAT CC) 60 MG 24 hr  "tablet    clotrimazole-betamethasone (LOTRISONE) 1-0.05 % cream    meloxicam (MOBIC) 15 mg tablet    No Known Allergies        Objective     Blood pressure 140/80, pulse 91, height 5' 2\" (1.575 m), weight 77.1 kg (170 lb), SpO2 97%, not currently breastfeeding. Body mass index is 31.09 kg/m².      PHYSICAL EXAM:      General Appearance:   Alert, cooperative, no distress   HEENT:   Normocephalic, atraumatic, anicteric.     Neck:  Supple, symmetrical, trachea midline   Lungs:   Clear to auscultation bilaterally; no rales, rhonchi or wheezing; respirations unlabored    Heart::   Regular rate and rhythm; no murmur, rub, or gallop.   Abdomen:   Soft, non-tender, non-distended; normal bowel sounds; no masses, no organomegaly    Genitalia:   Deferred    Rectal:   Deferred    Extremities:  No cyanosis, clubbing or edema    Pulses:  2+ and symmetric    Skin:  No jaundice, rashes, or lesions    Lymph nodes:  No palpable cervical lymphadenopathy        Lab Results:   No visits with results within 1 Day(s) from this visit.   Latest known visit with results is:   Appointment on 09/10/2024   Component Date Value    WBC 09/10/2024 4.75     RBC 09/10/2024 4.98     Hemoglobin 09/10/2024 13.3     Hematocrit 09/10/2024 41.7     MCV 09/10/2024 84     MCH 09/10/2024 26.7 (L)     MCHC 09/10/2024 31.9     RDW 09/10/2024 13.3     MPV 09/10/2024 10.8     Platelets 09/10/2024 378     nRBC 09/10/2024 0     Total Counted 09/10/2024 100     Segmented % 09/10/2024 20     Bands % 09/10/2024 3     Lymphocytes % 09/10/2024 57     Monocytes % 09/10/2024 3 (L)     Eosinophils % 09/10/2024 4     Basophils % 09/10/2024 2 (H)     Metamyelocytes % 09/10/2024 1     Myelocytes % 09/10/2024 1     Atypical Lymphocytes % 09/10/2024 9 (H)     Absolute Neutrophils 09/10/2024 1.09 (L)     Absolute Lymphocytes 09/10/2024 3.14     Absolute Monocytes 09/10/2024 0.14     Absolute Eosinophils 09/10/2024 0.19     Absolute Basophils 09/10/2024 0.10     Absolute " Metamyelocytes 09/10/2024 0.05     Absolute Myelocytes 09/10/2024 0.05     RBC Morphology 09/10/2024 Present     Platelet Estimate 09/10/2024 Adequate     Anisocytosis 09/10/2024 Present     Poikilocytes 09/10/2024 Present     Polychromasia 09/10/2024 Present     Scan Result 09/10/2024 SEE WRITTEN REPORT          Radiology Results:   XR knee 3 vw right non injury    Result Date: 9/11/2024  Narrative: XR KNEE 3 VW RIGHT NON INJURY INDICATION: M25.561: Pain in right knee G89.29: Other chronic pain. Right knee pain for a month after walking a lot on vacation COMPARISON: None FINDINGS: No acute fracture or dislocation. Small joint effusion. Mild medial tibiofemoral osteoarthritis, characterized by joint space narrowing. Patellar enthesophyte. No lytic or blastic osseous lesion. Unremarkable soft tissues.     Impression: No acute osseous abnormality. Degenerative changes as described. Resident: Rudy Wang I, the attending radiologist, have reviewed the images and agree with the final report above. Workstation performed: [id number]     XR neck soft tissue    Result Date: 8/21/2024  Narrative: XR NECK SOFT TISSUE INDICATION: globus sensation, unknown if ingested anything that could be stuck. COMPARISON: None FINDINGS: Normal epiglottis and aryepiglottic folds. Normal adenoids and palatine tonsils. No radiopaque foreign body. No evidence of an osseous abnormality, although this study is not optimized for evaluation of the cervical spine.     Impression: Unremarkable neck soft tissue radiograph. Workstation performed: [id number]

## 2024-09-19 NOTE — H&P (VIEW-ONLY)
[facility] - Outpatient Note  Fartun Hurley 59 y.o. female MRN: [id number]  Encounter: [id number]      ASSESSMENT AND PLAN:    Fartun Hurley is a 59 y.o. old pleasant female with PMH of vitamin D deficiency, prediabetes, hyperlipidemia who presents for globus, possible dysphagia, GERD and colon cancer screening    Globus  Possible GERD-patient with feeling of food stuck in throat ever since 8/20 when she had a bowl of fruit.  This sensation has occurred a total of 4-5 times and occurs without food.  The food she does eat and swallow has no difficulty going down the esophagus and has no pain.  She has also been having increased heartburn over the past month.  Unclear if her globus is from the heartburn versus possible esophageal irritation she received from some food  Start omeprazole 40 mg daily  Plan for EGD  GERD lifestyle changes discussed, including avoidance of trigger foods (potential foods include coffee, caffeine, chocolate, mint, tomato-based products, spicy foods, fatty foods), avoid tight fitting clothing, elevated head of bed 30 degrees, avoid eating 2-3 hours prior to bedtime, weight loss, avoid alcohol, avoid tobacco use.    Colon cancer screening-last colonoscopy 2014  Plan for colonoscopy          1. Globus sensation    - Ambulatory Referral to Gastroenterology    ______________________________________________________________________    SUBJECTIVE: Patient went to the ED on 8/20 after feeling like food got stuck in her throat.  She reports night before she ate a bowl of fruit and specifically had a significant amount of pineapple.  Ever since then she feels like she intermittently has this feeling of lump in her throat and feels like there is food stuck in there.  This occurs without food and has occurred a total of 4-5 times.  She also ports she feels like she is getting intermittent heartburn for the past month described as burning in the substernal  region in the lower area.  No nausea vomiting abdominal pain.  No weight loss fevers chills.  No changes in bowel habits.  No previous EGD.  Last colonoscopy 10 years ago was normal per patient.  No family history of GI cancer.  No abdominal surgeries.        I reviewed prior external notes    I reviewed previous lab results and images      REVIEW OF SYSTEMS:     REVIEW OF ALL OTHER SYSTEMS IS OTHERWISE NEGATIVE.      Historical Information   Past Medical History:   Diagnosis Date    Chronic kidney disease     High blood pressure     High cholesterol     Hypertension 14yr ago    Pre-diabetes      Past Surgical History:   Procedure Laterality Date    BREAST CYST EXCISION Left 01/01/1980    neg     Social History   Social History     Substance and Sexual Activity   Alcohol Use Yes    Alcohol/week: 2.0 standard drinks of alcohol    Types: 2 Glasses of wine per week     Social History     Substance and Sexual Activity   Drug Use No     Social History     Tobacco Use   Smoking Status Never    Passive exposure: Never   Smokeless Tobacco Never     Family History   Problem Relation Age of Onset    Diabetes Mother     Heart disease Mother     Alzheimer's disease Father     Hypertension Father     No Known Problems Sister     No Known Problems Sister     No Known Problems Sister     No Known Problems Sister     No Known Problems Daughter     No Known Problems Daughter     No Known Problems Maternal Grandmother     No Known Problems Paternal Grandmother     No Known Problems Maternal Aunt     No Known Problems Maternal Aunt     No Known Problems Paternal Aunt     Breast cancer Other 66    Breast cancer Other 66    Colon cancer Neg Hx     Ovarian cancer Neg Hx     Uterine cancer Neg Hx     Cervical cancer Neg Hx        Meds/Allergies       Current Outpatient Medications:     aluminum-magnesium hydroxide-simethicone (MAALOX) 200-200-20 MG/5ML SUSP    D3-50 1.25 MG (66695 UT) capsule    NIFEdipine ER (ADALAT CC) 60 MG 24 hr  "tablet    clotrimazole-betamethasone (LOTRISONE) 1-0.05 % cream    meloxicam (MOBIC) 15 mg tablet    No Known Allergies        Objective     Blood pressure 140/80, pulse 91, height 5' 2\" (1.575 m), weight 77.1 kg (170 lb), SpO2 97%, not currently breastfeeding. Body mass index is 31.09 kg/m².      PHYSICAL EXAM:      General Appearance:   Alert, cooperative, no distress   HEENT:   Normocephalic, atraumatic, anicteric.     Neck:  Supple, symmetrical, trachea midline   Lungs:   Clear to auscultation bilaterally; no rales, rhonchi or wheezing; respirations unlabored    Heart::   Regular rate and rhythm; no murmur, rub, or gallop.   Abdomen:   Soft, non-tender, non-distended; normal bowel sounds; no masses, no organomegaly    Genitalia:   Deferred    Rectal:   Deferred    Extremities:  No cyanosis, clubbing or edema    Pulses:  2+ and symmetric    Skin:  No jaundice, rashes, or lesions    Lymph nodes:  No palpable cervical lymphadenopathy        Lab Results:   No visits with results within 1 Day(s) from this visit.   Latest known visit with results is:   Appointment on 09/10/2024   Component Date Value    WBC 09/10/2024 4.75     RBC 09/10/2024 4.98     Hemoglobin 09/10/2024 13.3     Hematocrit 09/10/2024 41.7     MCV 09/10/2024 84     MCH 09/10/2024 26.7 (L)     MCHC 09/10/2024 31.9     RDW 09/10/2024 13.3     MPV 09/10/2024 10.8     Platelets 09/10/2024 378     nRBC 09/10/2024 0     Total Counted 09/10/2024 100     Segmented % 09/10/2024 20     Bands % 09/10/2024 3     Lymphocytes % 09/10/2024 57     Monocytes % 09/10/2024 3 (L)     Eosinophils % 09/10/2024 4     Basophils % 09/10/2024 2 (H)     Metamyelocytes % 09/10/2024 1     Myelocytes % 09/10/2024 1     Atypical Lymphocytes % 09/10/2024 9 (H)     Absolute Neutrophils 09/10/2024 1.09 (L)     Absolute Lymphocytes 09/10/2024 3.14     Absolute Monocytes 09/10/2024 0.14     Absolute Eosinophils 09/10/2024 0.19     Absolute Basophils 09/10/2024 0.10     Absolute " Metamyelocytes 09/10/2024 0.05     Absolute Myelocytes 09/10/2024 0.05     RBC Morphology 09/10/2024 Present     Platelet Estimate 09/10/2024 Adequate     Anisocytosis 09/10/2024 Present     Poikilocytes 09/10/2024 Present     Polychromasia 09/10/2024 Present     Scan Result 09/10/2024 SEE WRITTEN REPORT          Radiology Results:   XR knee 3 vw right non injury    Result Date: 9/11/2024  Narrative: XR KNEE 3 VW RIGHT NON INJURY INDICATION: M25.561: Pain in right knee G89.29: Other chronic pain. Right knee pain for a month after walking a lot on vacation COMPARISON: None FINDINGS: No acute fracture or dislocation. Small joint effusion. Mild medial tibiofemoral osteoarthritis, characterized by joint space narrowing. Patellar enthesophyte. No lytic or blastic osseous lesion. Unremarkable soft tissues.     Impression: No acute osseous abnormality. Degenerative changes as described. Resident: Rudy Wang I, the attending radiologist, have reviewed the images and agree with the final report above. Workstation performed: [id number]     XR neck soft tissue    Result Date: 8/21/2024  Narrative: XR NECK SOFT TISSUE INDICATION: globus sensation, unknown if ingested anything that could be stuck. COMPARISON: None FINDINGS: Normal epiglottis and aryepiglottic folds. Normal adenoids and palatine tonsils. No radiopaque foreign body. No evidence of an osseous abnormality, although this study is not optimized for evaluation of the cervical spine.     Impression: Unremarkable neck soft tissue radiograph. Workstation performed: [id number]

## 2024-09-23 ENCOUNTER — ANESTHESIA (OUTPATIENT)
Dept: GASTROENTEROLOGY | Facility: HOSPITAL | Age: 59
End: 2024-09-23
Payer: COMMERCIAL

## 2024-09-23 ENCOUNTER — HOSPITAL ENCOUNTER (OUTPATIENT)
Dept: GASTROENTEROLOGY | Facility: HOSPITAL | Age: 59
Setting detail: OUTPATIENT SURGERY
Discharge: HOME | End: 2024-09-23
Attending: INTERNAL MEDICINE
Payer: COMMERCIAL

## 2024-09-23 ENCOUNTER — ANESTHESIA EVENT (OUTPATIENT)
Dept: GASTROENTEROLOGY | Facility: HOSPITAL | Age: 59
End: 2024-09-23
Payer: COMMERCIAL

## 2024-09-23 VITALS
RESPIRATION RATE: 15 BRPM | WEIGHT: 165.79 LBS | OXYGEN SATURATION: 97 % | BODY MASS INDEX: 30.51 KG/M2 | TEMPERATURE: 97.9 F | DIASTOLIC BLOOD PRESSURE: 75 MMHG | SYSTOLIC BLOOD PRESSURE: 120 MMHG | HEART RATE: 82 BPM | HEIGHT: 62 IN

## 2024-09-23 DIAGNOSIS — K21.9 GASTROESOPHAGEAL REFLUX DISEASE WITHOUT ESOPHAGITIS: ICD-10-CM

## 2024-09-23 DIAGNOSIS — Z12.11 SCREENING FOR COLON CANCER: ICD-10-CM

## 2024-09-23 DIAGNOSIS — R09.A2 GLOBUS SENSATION: ICD-10-CM

## 2024-09-23 PROCEDURE — 43239 EGD BIOPSY SINGLE/MULTIPLE: CPT | Performed by: INTERNAL MEDICINE

## 2024-09-23 PROCEDURE — 88305 TISSUE EXAM BY PATHOLOGIST: CPT | Performed by: PATHOLOGY

## 2024-09-23 PROCEDURE — 43248 EGD GUIDE WIRE INSERTION: CPT | Performed by: INTERNAL MEDICINE

## 2024-09-23 PROCEDURE — 45380 COLONOSCOPY AND BIOPSY: CPT | Performed by: INTERNAL MEDICINE

## 2024-09-23 RX ORDER — SODIUM CHLORIDE, SODIUM LACTATE, POTASSIUM CHLORIDE, CALCIUM CHLORIDE 600; 310; 30; 20 MG/100ML; MG/100ML; MG/100ML; MG/100ML
INJECTION, SOLUTION INTRAVENOUS CONTINUOUS PRN
Status: DISCONTINUED | OUTPATIENT
Start: 2024-09-23 | End: 2024-09-23

## 2024-09-23 RX ORDER — LIDOCAINE HYDROCHLORIDE 20 MG/ML
INJECTION, SOLUTION EPIDURAL; INFILTRATION; INTRACAUDAL; PERINEURAL AS NEEDED
Status: DISCONTINUED | OUTPATIENT
Start: 2024-09-23 | End: 2024-09-23

## 2024-09-23 RX ORDER — PROPOFOL 10 MG/ML
INJECTION, EMULSION INTRAVENOUS AS NEEDED
Status: DISCONTINUED | OUTPATIENT
Start: 2024-09-23 | End: 2024-09-23

## 2024-09-23 RX ADMIN — PROPOFOL 50 MG: 10 INJECTION, EMULSION INTRAVENOUS at 13:43

## 2024-09-23 RX ADMIN — LIDOCAINE HYDROCHLORIDE 100 MG: 20 INJECTION, SOLUTION EPIDURAL; INFILTRATION; INTRACAUDAL; PERINEURAL at 13:41

## 2024-09-23 RX ADMIN — PROPOFOL 30 MG: 10 INJECTION, EMULSION INTRAVENOUS at 13:57

## 2024-09-23 RX ADMIN — SODIUM CHLORIDE, SODIUM LACTATE, POTASSIUM CHLORIDE, AND CALCIUM CHLORIDE: .6; .31; .03; .02 INJECTION, SOLUTION INTRAVENOUS at 13:37

## 2024-09-23 RX ADMIN — PROPOFOL 150 MG: 10 INJECTION, EMULSION INTRAVENOUS at 13:41

## 2024-09-23 RX ADMIN — PROPOFOL 30 MG: 10 INJECTION, EMULSION INTRAVENOUS at 13:46

## 2024-09-23 RX ADMIN — PROPOFOL 30 MG: 10 INJECTION, EMULSION INTRAVENOUS at 14:01

## 2024-09-23 RX ADMIN — PROPOFOL 30 MG: 10 INJECTION, EMULSION INTRAVENOUS at 13:49

## 2024-09-23 NOTE — ANESTHESIA PREPROCEDURE EVALUATION
Procedure:  COLONOSCOPY  EGD    Relevant Problems   CARDIO   (+) Essential hypertension   (+) Mild mitral regurgitation by prior echocardiography   (+) Mixed hyperlipidemia      GI/HEPATIC   (+) Hiatal hernia      /RENAL   (+) Chronic kidney disease, stage II (mild)      MUSCULOSKELETAL   (+) Hiatal hernia        Physical Exam    Airway    Mallampati score: II  TM Distance: >3 FB       Dental       Cardiovascular  Cardiovascular exam normal    Pulmonary  Pulmonary exam normal     Other Findings        Anesthesia Plan  ASA Score- 2     Anesthesia Type- IV sedation with anesthesia with ASA Monitors.         Additional Monitors:     Airway Plan:            Plan Factors-    Chart reviewed.   Existing labs reviewed. Patient summary reviewed.    Patient is not a current smoker.              Induction- intravenous.    Postoperative Plan-         Informed Consent- Anesthetic plan and risks discussed with patient.  I personally reviewed this patient with the CRNA. Discussed and agreed on the Anesthesia Plan with the CRNA..

## 2024-09-23 NOTE — ANESTHESIA POSTPROCEDURE EVALUATION
Post-Op Assessment Note    CV Status:  Stable  Pain Score: 0    Pain management: adequate       Mental Status:  Arousable and sleepy   Hydration Status:  Euvolemic   PONV Controlled:  Controlled   Airway Patency:  Patent     Post Op Vitals Reviewed: Yes    No anethesia notable event occurred.    Staff: SHAISTA               /63 (09/23/24 1406)    Temp 97.9 °F (36.6 °C) (09/23/24 1406)    Pulse 83 (09/23/24 1406)   Resp 13 (09/23/24 1406)    SpO2 99 % (09/23/24 1406)

## 2024-09-24 ENCOUNTER — RA CDI HCC (OUTPATIENT)
Dept: OTHER | Facility: HOSPITAL | Age: 59
End: 2024-09-24

## 2024-09-25 PROCEDURE — 88305 TISSUE EXAM BY PATHOLOGIST: CPT | Performed by: PATHOLOGY

## 2024-09-25 NOTE — PROGRESS NOTES
HCC coding opportunities          Chart Reviewed number of suggestions sent to Provider: 1  E11.36     Patients Insurance        Commercial Insurance: [employer]

## 2024-10-07 ENCOUNTER — OFFICE VISIT (OUTPATIENT)
Dept: OBGYN CLINIC | Facility: CLINIC | Age: 59
End: 2024-10-07
Payer: COMMERCIAL

## 2024-10-07 VITALS
BODY MASS INDEX: 31.1 KG/M2 | WEIGHT: 169 LBS | HEART RATE: 69 BPM | SYSTOLIC BLOOD PRESSURE: 146 MMHG | DIASTOLIC BLOOD PRESSURE: 86 MMHG | HEIGHT: 62 IN

## 2024-10-07 DIAGNOSIS — M25.561 CHRONIC PAIN OF RIGHT KNEE: ICD-10-CM

## 2024-10-07 DIAGNOSIS — M25.461 EFFUSION OF RIGHT KNEE: ICD-10-CM

## 2024-10-07 DIAGNOSIS — M23.91 INTERNAL DERANGEMENT OF KNEE, RIGHT: Primary | ICD-10-CM

## 2024-10-07 DIAGNOSIS — G89.29 CHRONIC PAIN OF RIGHT KNEE: ICD-10-CM

## 2024-10-07 PROCEDURE — 99204 OFFICE O/P NEW MOD 45 MIN: CPT | Performed by: ORTHOPAEDIC SURGERY

## 2024-10-07 NOTE — PROGRESS NOTES
Patient Name:  Fartun Hurley  MRN:  [id number]    Assessment & Plan     1. Internal derangement of knee, right  -     MRI knee right  wo contrast; Future; Expected date: 10/07/2024  2. Chronic pain of right knee  -     Ambulatory Referral to Orthopedic Surgery  3. Effusion of right knee  -     MRI knee right  wo contrast; Future; Expected date: 10/07/2024      Right knee internal derangement, concern for medial meniscus tear.  X-rays were reviewed in office today with patient  Due to her progressively worsening pain localized at the medial aspect of the knee with normal x-rays and exam findings, including positive special testing, suggesting medial meniscus tear, I have ordered an MRI for further evaluation of suspected medial meniscus tear.   The patient was instructed to avoid any deep squatting, pivoting, or twisting.  Continue with OTC medications, possible CBD ointment as she did not respond to Voltaren Gel.   Follow-up after MRI for review and further treatment planning.    Chief Complaint     Right knee pain    History of the Present Illness     Fartun Hurley is a 59 y.o. female with Right knee pain that started early July when she was doing an extended amount of walking during her vacation. She treated with a prenidsone taper and Meloxicam, states this provided temporary relief. Shortly after she returned she went to [place] to help with [employer] averaging around 20,000 steps a day for two weeks. Since she returned from [place] on 9/9/24 she has been experiencing constant medial sided pain that radiates posteriorly when she attempts to get up from a seated position she feels her knee will lock. She's noticed mechanical symptoms of catching, clicking and popping along with instability depending on her activity level. She has been treating with warm water epsom salt soaks to help with the swelling.  She will take tylenol occasionally giving her minimal relief. Patient denies starting  "physical therapy or being offered a cortisone injection to her knee. She denies pain or injury to her knee prior to vacation.  Patient works at [employer].    Review of Systems     Review of Systems   Constitutional:  Negative for chills and fever.   HENT:  Negative for ear pain and sore throat.    Eyes:  Negative for pain and visual disturbance.   Respiratory:  Negative for cough and shortness of breath.    Cardiovascular:  Negative for chest pain and palpitations.   Gastrointestinal:  Negative for abdominal pain and vomiting.   Genitourinary:  Negative for dysuria and hematuria.   Musculoskeletal:  Positive for arthralgias. Negative for back pain.   Skin:  Negative for color change and rash.   Neurological:  Negative for seizures and syncope.   All other systems reviewed and are negative.      Physical Exam     /86   Pulse 69   Ht 5' 2\" (1.575 m)   Wt 76.7 kg (169 lb)   BMI 30.91 kg/m²     Right Knee  Range of motion from 0 to 120.     There is moderate effusion.    There is  tenderness over the medial joint line.    Varus stress testing reveals instability at 0 and 30 degrees   Valgus stress testing reveals instability at 0 and 30 degrees  Negative anterior and posterior drawer testing  Negative Lachman's testing  Avery's testing is positive    The patient is neurovascular intact distally.      Eyes:  Anicteric sclerae.  Neck:  Supple.  Lungs:  Normal respiratory effort.  Cardiovascular:  Capillary refill is less than 2 seconds.  Skin:  Intact without erythema.  Neurologic:  Sensation grossly intact to light touch.  Psychiatric:  Mood and affect are appropriate.    Data Review     I have personally reviewed pertinent films in PACS, and my interpretation follows:    X-rays taken 9/10/2024 of Right independently reviewed and display no fracture or dislocation.  Joint spaces well-maintained.      Past Medical History:   Diagnosis Date    Chronic kidney disease     High blood pressure     High cholesterol  "    Hypertension 14yr ago    Pre-diabetes        Past Surgical History:   Procedure Laterality Date    BREAST CYST EXCISION Left 01/01/1980    neg    COLONOSCOPY         No Known Allergies    Current Outpatient Medications on File Prior to Visit   Medication Sig Dispense Refill    D3-50 1.25 MG (47168 UT) capsule TAKE 1 CAPSULE (50,000 UNITS TOTAL) BY MOUTH ONCE A WEEK FOR 24 DOSES 12 capsule 1    NIFEdipine ER (ADALAT CC) 60 MG 24 hr tablet Take 1 tablet (60 mg total) by mouth daily 90 tablet 1    aluminum-magnesium hydroxide-simethicone (MAALOX) 200-200-20 MG/5ML SUSP Take 30 mL by mouth 4 (four) times a day (before meals and at bedtime) (Patient not taking: Reported on 9/20/2024) 355 mL 0    [DISCONTINUED] clotrimazole-betamethasone (LOTRISONE) 1-0.05 % cream Apply topically 2 (two) times a day as needed (for itching) for up to 14 days 45 g 1    [DISCONTINUED] meloxicam (MOBIC) 15 mg tablet Take 15 mg by mouth daily       No current facility-administered medications on file prior to visit.       Social History     Tobacco Use    Smoking status: Never     Passive exposure: Never    Smokeless tobacco: Never   Vaping Use    Vaping status: Never Used   Substance Use Topics    Alcohol use: Yes     Alcohol/week: 2.0 standard drinks of alcohol     Types: 2 Glasses of wine per week     Comment: socially    Drug use: No       Family History   Problem Relation Age of Onset    Diabetes Mother     Heart disease Mother     Alzheimer's disease Father     Hypertension Father     No Known Problems Sister     No Known Problems Sister     No Known Problems Sister     No Known Problems Sister     No Known Problems Daughter     No Known Problems Daughter     No Known Problems Maternal Grandmother     No Known Problems Paternal Grandmother     No Known Problems Maternal Aunt     No Known Problems Maternal Aunt     No Known Problems Paternal Aunt     Breast cancer Other 66    Breast cancer Other 66    Colon cancer Neg Hx     Ovarian  cancer Neg Hx     Uterine cancer Neg Hx     Cervical cancer Neg Hx              Procedures Performed     Procedures  No procedures were performed today    Scribe Attestation      I,:  Marlene Bosch am acting as a scribe while in the presence of the attending physician.:       I,:  Robin Zavala, DO personally performed the services described in this documentation    as scribed in my presence.:               Marlene Bosch

## 2024-10-13 DIAGNOSIS — I10 ESSENTIAL HYPERTENSION: ICD-10-CM

## 2024-10-14 ENCOUNTER — APPOINTMENT (EMERGENCY)
Dept: CT IMAGING | Facility: HOSPITAL | Age: 59
End: 2024-10-14
Payer: COMMERCIAL

## 2024-10-14 ENCOUNTER — HOSPITAL ENCOUNTER (EMERGENCY)
Facility: HOSPITAL | Age: 59
Discharge: HOME | End: 2024-10-15
Attending: EMERGENCY MEDICINE
Payer: COMMERCIAL

## 2024-10-14 DIAGNOSIS — R51.9 HEADACHE: Primary | ICD-10-CM

## 2024-10-14 DIAGNOSIS — R73.09 ELEVATED GLUCOSE: ICD-10-CM

## 2024-10-14 LAB
ALBUMIN SERPL BCG-MCNC: 4.6 G/DL (ref 3.5–5)
ALP SERPL-CCNC: 80 U/L (ref 34–104)
ALT SERPL W P-5'-P-CCNC: 20 U/L (ref 7–52)
ANION GAP SERPL CALCULATED.3IONS-SCNC: 11 MMOL/L (ref 4–13)
APTT PPP: 27 SECONDS (ref 23–34)
AST SERPL W P-5'-P-CCNC: 16 U/L (ref 13–39)
BASOPHILS # BLD AUTO: 0.03 THOUSANDS/ΜL (ref 0–0.1)
BASOPHILS NFR BLD AUTO: 0 % (ref 0–1)
BILIRUB SERPL-MCNC: 0.31 MG/DL (ref 0.2–1)
BUN SERPL-MCNC: 17 MG/DL (ref 5–25)
CALCIUM SERPL-MCNC: 9.7 MG/DL (ref 8.4–10.2)
CHLORIDE SERPL-SCNC: 102 MMOL/L (ref 96–108)
CO2 SERPL-SCNC: 26 MMOL/L (ref 21–32)
CREAT SERPL-MCNC: 1.24 MG/DL (ref 0.6–1.3)
EOSINOPHIL # BLD AUTO: 0.08 THOUSAND/ΜL (ref 0–0.61)
EOSINOPHIL NFR BLD AUTO: 1 % (ref 0–6)
ERYTHROCYTE [DISTWIDTH] IN BLOOD BY AUTOMATED COUNT: 12.9 % (ref 11.6–15.1)
FLUAV AG UPPER RESP QL IA.RAPID: NEGATIVE
FLUBV AG UPPER RESP QL IA.RAPID: NEGATIVE
GFR SERPL CREATININE-BSD FRML MDRD: 46 ML/MIN/1.73SQ M
GLUCOSE SERPL-MCNC: 239 MG/DL (ref 65–140)
HCT VFR BLD AUTO: 43.1 % (ref 34.8–46.1)
HGB BLD-MCNC: 13.8 G/DL (ref 11.5–15.4)
IMM GRANULOCYTES # BLD AUTO: 0.02 THOUSAND/UL (ref 0–0.2)
IMM GRANULOCYTES NFR BLD AUTO: 0 % (ref 0–2)
INR PPP: 0.87 (ref 0.85–1.19)
LYMPHOCYTES # BLD AUTO: 1.74 THOUSANDS/ΜL (ref 0.6–4.47)
LYMPHOCYTES NFR BLD AUTO: 23 % (ref 14–44)
MCH RBC QN AUTO: 26.1 PG (ref 26.8–34.3)
MCHC RBC AUTO-ENTMCNC: 32 G/DL (ref 31.4–37.4)
MCV RBC AUTO: 82 FL (ref 82–98)
MONOCYTES # BLD AUTO: 0.45 THOUSAND/ΜL (ref 0.17–1.22)
MONOCYTES NFR BLD AUTO: 6 % (ref 4–12)
NEUTROPHILS # BLD AUTO: 5.16 THOUSANDS/ΜL (ref 1.85–7.62)
NEUTS SEG NFR BLD AUTO: 70 % (ref 43–75)
NRBC BLD AUTO-RTO: 0 /100 WBCS
PLATELET # BLD AUTO: 376 THOUSANDS/UL (ref 149–390)
PMV BLD AUTO: 10.5 FL (ref 8.9–12.7)
POTASSIUM SERPL-SCNC: 3.2 MMOL/L (ref 3.5–5.3)
PROT SERPL-MCNC: 7.7 G/DL (ref 6.4–8.4)
PROTHROMBIN TIME: 12.5 SECONDS (ref 12.3–15)
RBC # BLD AUTO: 5.28 MILLION/UL (ref 3.81–5.12)
SARS-COV+SARS-COV-2 AG RESP QL IA.RAPID: NEGATIVE
SODIUM SERPL-SCNC: 139 MMOL/L (ref 135–147)
WBC # BLD AUTO: 7.48 THOUSAND/UL (ref 4.31–10.16)

## 2024-10-14 PROCEDURE — 70450 CT HEAD/BRAIN W/O DYE: CPT

## 2024-10-14 PROCEDURE — 99284 EMERGENCY DEPT VISIT MOD MDM: CPT

## 2024-10-14 PROCEDURE — 85610 PROTHROMBIN TIME: CPT

## 2024-10-14 PROCEDURE — 87811 SARS-COV-2 COVID19 W/OPTIC: CPT

## 2024-10-14 PROCEDURE — 85730 THROMBOPLASTIN TIME PARTIAL: CPT

## 2024-10-14 PROCEDURE — 36415 COLL VENOUS BLD VENIPUNCTURE: CPT

## 2024-10-14 PROCEDURE — 96365 THER/PROPH/DIAG IV INF INIT: CPT

## 2024-10-14 PROCEDURE — 96375 TX/PRO/DX INJ NEW DRUG ADDON: CPT

## 2024-10-14 PROCEDURE — 96368 THER/DIAG CONCURRENT INF: CPT

## 2024-10-14 PROCEDURE — 99285 EMERGENCY DEPT VISIT HI MDM: CPT

## 2024-10-14 PROCEDURE — 85025 COMPLETE CBC W/AUTO DIFF WBC: CPT

## 2024-10-14 PROCEDURE — 80053 COMPREHEN METABOLIC PANEL: CPT

## 2024-10-14 PROCEDURE — 87804 INFLUENZA ASSAY W/OPTIC: CPT

## 2024-10-14 PROCEDURE — 96361 HYDRATE IV INFUSION ADD-ON: CPT

## 2024-10-14 RX ORDER — MAGNESIUM SULFATE HEPTAHYDRATE 40 MG/ML
2 INJECTION, SOLUTION INTRAVENOUS ONCE
Status: COMPLETED | OUTPATIENT
Start: 2024-10-14 | End: 2024-10-14

## 2024-10-14 RX ORDER — ACETAMINOPHEN 10 MG/ML
1000 INJECTION, SOLUTION INTRAVENOUS ONCE
Status: COMPLETED | OUTPATIENT
Start: 2024-10-14 | End: 2024-10-14

## 2024-10-14 RX ORDER — DIPHENHYDRAMINE HYDROCHLORIDE 50 MG/ML
25 INJECTION INTRAMUSCULAR; INTRAVENOUS ONCE
Status: COMPLETED | OUTPATIENT
Start: 2024-10-14 | End: 2024-10-14

## 2024-10-14 RX ORDER — METOCLOPRAMIDE HYDROCHLORIDE 5 MG/ML
10 INJECTION INTRAMUSCULAR; INTRAVENOUS ONCE
Status: COMPLETED | OUTPATIENT
Start: 2024-10-14 | End: 2024-10-14

## 2024-10-14 RX ADMIN — DIPHENHYDRAMINE HYDROCHLORIDE 25 MG: 50 INJECTION, SOLUTION INTRAMUSCULAR; INTRAVENOUS at 22:50

## 2024-10-14 RX ADMIN — SODIUM CHLORIDE 1000 ML: 0.9 INJECTION, SOLUTION INTRAVENOUS at 22:50

## 2024-10-14 RX ADMIN — MAGNESIUM SULFATE HEPTAHYDRATE 2 G: 40 INJECTION, SOLUTION INTRAVENOUS at 22:52

## 2024-10-14 RX ADMIN — METOCLOPRAMIDE 10 MG: 5 INJECTION, SOLUTION INTRAMUSCULAR; INTRAVENOUS at 22:50

## 2024-10-14 RX ADMIN — ACETAMINOPHEN 1000 MG: 1000 INJECTION, SOLUTION INTRAVENOUS at 22:51

## 2024-10-14 NOTE — Clinical Note
Fartun Hurley was seen and treated in our emergency department on 10/14/2024.                Diagnosis:     Fartun  may return to work on return date, is off the rest of the shift today.    She may return on this date: 10/17/2024         If you have any questions or concerns, please don't hesitate to call.      Jeffry Solano PA-C    ______________________________           _______________          _______________  Hospital Representative                              Date                                Time

## 2024-10-14 NOTE — Clinical Note
Fartun Hurley was seen and treated in our emergency department on 10/14/2024.                Diagnosis:     Fartun  may return to work on return date, is off the rest of the shift today.    She may return on this date: 10/22/2024    May return sooner if feeling improved     If you have any questions or concerns, please don't hesitate to call.      Jeffry Solano PA-C    ______________________________           _______________          _______________  Hospital Representative                              Date                                Time

## 2024-10-15 VITALS
TEMPERATURE: 98 F | DIASTOLIC BLOOD PRESSURE: 77 MMHG | OXYGEN SATURATION: 99 % | WEIGHT: 166 LBS | BODY MASS INDEX: 30.55 KG/M2 | HEART RATE: 70 BPM | SYSTOLIC BLOOD PRESSURE: 127 MMHG | HEIGHT: 62 IN | RESPIRATION RATE: 20 BRPM

## 2024-10-15 LAB
BACTERIA UR QL AUTO: ABNORMAL /HPF
BILIRUB UR QL STRIP: NEGATIVE
CLARITY UR: CLEAR
COLOR UR: ABNORMAL
GLUCOSE UR STRIP-MCNC: NEGATIVE MG/DL
HGB UR QL STRIP.AUTO: NEGATIVE
KETONES UR STRIP-MCNC: NEGATIVE MG/DL
LEUKOCYTE ESTERASE UR QL STRIP: NEGATIVE
NITRITE UR QL STRIP: NEGATIVE
NON-SQ EPI CELLS URNS QL MICRO: ABNORMAL /HPF
PH UR STRIP.AUTO: 6.5 [PH]
PROT UR STRIP-MCNC: ABNORMAL MG/DL
RBC #/AREA URNS AUTO: ABNORMAL /HPF
SP GR UR STRIP.AUTO: 1.01 (ref 1–1.03)
UROBILINOGEN UR STRIP-ACNC: <2 MG/DL
WBC #/AREA URNS AUTO: ABNORMAL /HPF

## 2024-10-15 PROCEDURE — 81001 URINALYSIS AUTO W/SCOPE: CPT

## 2024-10-15 RX ORDER — POTASSIUM CHLORIDE 1500 MG/1
40 TABLET, EXTENDED RELEASE ORAL ONCE
Status: COMPLETED | OUTPATIENT
Start: 2024-10-15 | End: 2024-10-15

## 2024-10-15 RX ADMIN — POTASSIUM CHLORIDE 40 MEQ: 1500 TABLET, EXTENDED RELEASE ORAL at 01:30

## 2024-10-15 NOTE — ED PROVIDER NOTES
Time reflects when diagnosis was documented in both MDM as applicable and the Disposition within this note       Time User Action Codes Description Comment    10/15/2024 12:47 AM Jeffry Solano Add [R51.9] Headache     10/15/2024 12:47 AM Jeffry Solano [R73.09] Elevated glucose           ED Disposition       ED Disposition   Discharge    Condition   Stable    Date/Time   Tue Oct 15, 2024  2:41 AM    Comment   Fartun Hurley discharge to home/self care.                   Assessment & Plan       Medical Decision Making  This patient presents with a headache most consistent with benign headache from either tension type headache vs migraine. No headache red flags. Neurologic exam without evidence of meningismus, AMS, focal neurologic findings so doubt meningitis, encephalitis, stroke. Presentation not consistent with acute intracranial bleed to include SAH (lack of risk factors, headache history). No history of trauma so doubt ICH. Given history and physical temporal arteritis unlikely, as is acute angle closure glaucoma. Doubt carotid artery dissection given no focal neuro deficits, no neck trauma or recent neck strain. Patient with no signs of increased intracranial pressure or weight loss and history and physical suggest more benign headache so less likely mass effect in brain from tumor or abscess or idiopathic intracranial hypertension. CT head showed No acute intracranial abnormality. Discussed the results with the patient who is feeling much improved. Patient will follow up closely with PCP for management of sugar and headache. Given strict return parameters. Prior to discharge, discharge instructions were discussed with patient at bedside. Patient was provided both verbal and written instructions. Patient is understanding of the discharge instructions and is agreeable to plan of care. Return precautions were discussed with patient bedside, patient verbalized understanding of signs and symptoms that  would necessitate return to the ED. All questions were answered. Patient was comfortable with the plan of care and discharged to home.         Problems Addressed:  Elevated glucose: acute illness or injury  Headache: acute illness or injury    Amount and/or Complexity of Data Reviewed  Labs: ordered.  Radiology: ordered. Decision-making details documented in ED Course.    Risk  Prescription drug management.        ED Course as of 10/15/24 0545   Mon Oct 14, 2024   2213 CT head without contrast  No acute intracranial abnormality.       Medications   sodium chloride 0.9 % bolus 1,000 mL (0 mL Intravenous Stopped 10/15/24 0122)   acetaminophen (Ofirmev) injection 1,000 mg (0 mg Intravenous Stopped 10/14/24 2355)   magnesium sulfate 2 g/50 mL IVPB (premix) 2 g (0 g Intravenous Stopped 10/14/24 2355)   metoclopramide (REGLAN) injection 10 mg (10 mg Intravenous Given 10/14/24 2250)   diphenhydrAMINE (BENADRYL) injection 25 mg (25 mg Intravenous Given 10/14/24 2250)   potassium chloride (Klor-Con M20) CR tablet 40 mEq (40 mEq Oral Given 10/15/24 0130)       ED Risk Strat Scores                          SBIRT 22yo+      Flowsheet Row Most Recent Value   Initial Alcohol Screen: US AUDIT-C     1. How often do you have a drink containing alcohol? 0 Filed at: 10/14/2024 2249   2. How many drinks containing alcohol do you have on a typical day you are drinking?  0 Filed at: 10/14/2024 2249   3b. FEMALE Any Age, or MALE 65+: How often do you have 4 or more drinks on one occassion? 0 Filed at: 10/14/2024 2249   Audit-C Score 0 Filed at: 10/14/2024 2249   RAMU: How many times in the past year have you...    Used an illegal drug or used a prescription medication for non-medical reasons? Never Filed at: 10/14/2024 2249                            History of Present Illness       Chief Complaint   Patient presents with    Headache     Pt c.o headache since 1100 this morning, tylenol with no relief. Sensitive to light.        Past  Medical History:   Diagnosis Date    Chronic kidney disease     High blood pressure     High cholesterol     Hypertension 14yr ago    Pre-diabetes       Past Surgical History:   Procedure Laterality Date    BREAST CYST EXCISION Left 01/01/1980    neg    COLONOSCOPY        Family History   Problem Relation Age of Onset    Diabetes Mother     Heart disease Mother     Alzheimer's disease Father     Hypertension Father     No Known Problems Sister     No Known Problems Sister     No Known Problems Sister     No Known Problems Sister     No Known Problems Daughter     No Known Problems Daughter     No Known Problems Maternal Grandmother     No Known Problems Paternal Grandmother     No Known Problems Maternal Aunt     No Known Problems Maternal Aunt     No Known Problems Paternal Aunt     Breast cancer Other 66    Breast cancer Other 66    Colon cancer Neg Hx     Ovarian cancer Neg Hx     Uterine cancer Neg Hx     Cervical cancer Neg Hx       Social History     Tobacco Use    Smoking status: Never     Passive exposure: Never    Smokeless tobacco: Never   Vaping Use    Vaping status: Never Used   Substance Use Topics    Alcohol use: Yes     Alcohol/week: 2.0 standard drinks of alcohol     Types: 2 Glasses of wine per week     Comment: socially    Drug use: No      E-Cigarette/Vaping    E-Cigarette Use Never User       E-Cigarette/Vaping Substances    Nicotine No     THC No     CBD No     Flavoring No     Other No     Unknown No       I have reviewed and agree with the history as documented.     The patient is a 59 y.o. female with a history of CKD, high cholesterol, hypertension, prediabetes who presents to [facility] with a chief complaint of headache. Symptoms began 11am this morning and have been constant since onset. Her pain is currently rated as a 7/10 in severity and described as sharp without radiation. Associated symptoms include photophobia. Symptoms are aggravated with light and alleviating  factors include none noted. The patient denies fever, chills, night sweats, blurred vision, double vision, chest pain, shortness breath, cough, neck pain, falls, trauma, dizziness, lightness, syncope, loss conscious, numbness, tingling, weakness, difficulty ambulating, slurred speech, confusion. No other reported symptoms at this time.  Patient denies allergies to anything          History provided by:  Patient   used: No    Headache  Associated symptoms: no abdominal pain, no back pain, no cough, no dizziness, no ear pain, no eye pain, no fever, no numbness, no seizures, no sore throat, no vomiting and no weakness        Review of Systems   Constitutional:  Negative for chills and fever.   HENT:  Negative for ear pain and sore throat.    Eyes:  Negative for pain and visual disturbance.   Respiratory:  Negative for cough and shortness of breath.    Cardiovascular:  Negative for chest pain and palpitations.   Gastrointestinal:  Negative for abdominal pain and vomiting.   Genitourinary:  Negative for dysuria and hematuria.   Musculoskeletal:  Negative for arthralgias and back pain.   Skin:  Negative for color change and rash.   Neurological:  Positive for headaches. Negative for dizziness, tremors, seizures, syncope, facial asymmetry, speech difficulty, weakness, light-headedness and numbness.   All other systems reviewed and are negative.          Objective       ED Triage Vitals   Temperature Pulse Blood Pressure Respirations SpO2 Patient Position - Orthostatic VS   10/14/24 2041 10/14/24 2041 10/14/24 2041 10/14/24 2041 10/14/24 2041 10/14/24 2041   98 °F (36.7 °C) 89 (!) 202/97 18 100 % Sitting      Temp src Heart Rate Source BP Location FiO2 (%) Pain Score    -- 10/14/24 2041 10/14/24 2041 -- 10/14/24 2247     Monitor Left arm  8      Vitals      Date and Time Temp Pulse SpO2 Resp BP Pain Score FACES Pain Rating User   10/15/24 0230 -- 70 99 % 20 127/77 2 -- SH   10/15/24 0200 -- 69 99 % 20  116/67 5 --    10/15/24 0145 -- 69 97 % 20 114/58 -- --    10/15/24 0100 -- 71 97 % 20 127/66 -- --    10/15/24 0000 -- 74 96 % 18 103/52 -- --    10/14/24 2330 -- 76 96 % 18 92/50 -- --    10/14/24 2300 -- 72 96 % 20 105/57 5 --    10/14/24 2247 -- -- -- -- -- 8 --    10/14/24 2245 -- 81 97 % 18 111/62 -- --    10/14/24 2041 98 °F (36.7 °C) 89 100 % 18 202/97 -- -- RO            Physical Exam  Vitals reviewed.   Constitutional:       General: She is not in acute distress.     Appearance: She is not ill-appearing, toxic-appearing or diaphoretic.   HENT:      Head: Normocephalic and atraumatic.      Right Ear: Tympanic membrane, ear canal and external ear normal.      Left Ear: Tympanic membrane, ear canal and external ear normal.      Nose: Nose normal.      Mouth/Throat:      Mouth: Mucous membranes are moist.      Pharynx: Oropharynx is clear. No oropharyngeal exudate.   Eyes:      Conjunctiva/sclera: Conjunctivae normal.   Neck:      Vascular: No carotid bruit.   Cardiovascular:      Rate and Rhythm: Normal rate.      Pulses: Normal pulses.   Pulmonary:      Effort: Pulmonary effort is normal. No respiratory distress.      Breath sounds: Normal breath sounds. No wheezing or rales.   Abdominal:      General: Abdomen is flat.      Palpations: Abdomen is soft.   Musculoskeletal:         General: No deformity or signs of injury. Normal range of motion.      Cervical back: Normal range of motion and neck supple. No rigidity or tenderness.      Right lower leg: No edema.      Left lower leg: No edema.   Lymphadenopathy:      Cervical: No cervical adenopathy.   Skin:     General: Skin is warm and dry.      Capillary Refill: Capillary refill takes less than 2 seconds.      Coloration: Skin is not jaundiced or pale.      Findings: No bruising, erythema or lesion.   Neurological:      General: No focal deficit present.      Mental Status: She is alert and oriented to person, place, and time. Mental status  is at baseline.      GCS: GCS eye subscore is 4. GCS verbal subscore is 5. GCS motor subscore is 6.      Cranial Nerves: Cranial nerves 2-12 are intact. No cranial nerve deficit.      Sensory: Sensation is intact. No sensory deficit.      Motor: Motor function is intact. No weakness.      Coordination: Coordination is intact. Coordination normal. Finger-Nose-Finger Test normal.      Gait: Gait is intact.         Results Reviewed       Procedure Component Value Units Date/Time    Urine Microscopic [[id number]]  (Abnormal) Collected: 10/15/24 0141    Lab Status: Final result Specimen: Urine, Clean Catch Updated: 10/15/24 0150     RBC, UA 1-2 /hpf      WBC, UA 2-4 /hpf      Epithelial Cells Occasional /hpf      Bacteria, UA Occasional /hpf     UA w Reflex to Microscopic w Reflex to Culture [[id number]]  (Abnormal) Collected: 10/15/24 0141    Lab Status: Final result Specimen: Urine, Clean Catch Updated: 10/15/24 0146     Color, UA Light Yellow     Clarity, UA Clear     Specific Gravity, UA 1.014     pH, UA 6.5     Leukocytes, UA Negative     Nitrite, UA Negative     Protein, UA 70 (1+) mg/dl      Glucose, UA Negative mg/dl      Ketones, UA Negative mg/dl      Urobilinogen, UA <2.0 mg/dl      Bilirubin, UA Negative     Occult Blood, UA Negative    FLU/COVID Rapid Antigen (30 min. TAT) - Preferred screening test in ED [[id number]]  (Normal) Collected: 10/14/24 2246    Lab Status: Final result Specimen: Nares from Nose Updated: 10/14/24 2308     SARS COV Rapid Antigen Negative     Influenza A Rapid Antigen Negative     Influenza B Rapid Antigen Negative    Narrative:      This test has been performed using the [employer]idel Carmina 2 FLU+SARS Antigen test under the Emergency Use Authorization (EUA). This test has been validated by the  and verified by the performing laboratory. The Carmina uses lateral flow immunofluorescent sandwich assay to detect SARS-COV, Influenza A and Influenza B Antigen.     The Quidel Carmina 2 SARS  Antigen test does not differentiate between SARS-CoV and SARS-CoV-2.     Negative results are presumptive and may be confirmed with a molecular assay, if necessary, for patient management. Negative results do not rule out SARS-CoV-2 or influenza infection and should not be used as the sole basis for treatment or patient management decisions. A negative test result may occur if the level of antigen in a sample is below the limit of detection of this test.     Positive results are indicative of the presence of viral antigens, but do not rule out bacterial infection or co-infection with other viruses.     All test results should be used as an adjunct to clinical observations and other information available to the provider.    FOR PEDIATRIC PATIENTS - copy/paste COVID Guidelines URL to browser: [email or web address]    Comprehensive metabolic panel [[id number]]  (Abnormal) Collected: 10/14/24 2246    Lab Status: Final result Specimen: Blood from Arm, Left Updated: 10/14/24 2307     Sodium 139 mmol/L      Potassium 3.2 mmol/L      Chloride 102 mmol/L      CO2 26 mmol/L      ANION GAP 11 mmol/L      BUN 17 mg/dL      Creatinine 1.24 mg/dL      Glucose 239 mg/dL      Calcium 9.7 mg/dL      AST 16 U/L      ALT 20 U/L      Alkaline Phosphatase 80 U/L      Total Protein 7.7 g/dL      Albumin 4.6 g/dL      Total Bilirubin 0.31 mg/dL      eGFR 46 ml/min/1.73sq m     Narrative:      National Kidney Disease Foundation guidelines for Chronic Kidney Disease (CKD):     Stage 1 with normal or high GFR (GFR > 90 mL/min/1.73 square meters)    Stage 2 Mild CKD (GFR = 60-89 mL/min/1.73 square meters)    Stage 3A Moderate CKD (GFR = 45-59 mL/min/1.73 square meters)    Stage 3B Moderate CKD (GFR = 30-44 mL/min/1.73 square meters)    Stage 4 Severe CKD (GFR = 15-29 mL/min/1.73 square meters)    Stage 5 End Stage CKD (GFR <15 mL/min/1.73 square meters)  Note: GFR calculation is accurate  only with a steady state creatinine    Protime-INR [[id number]]  (Normal) Collected: 10/14/24 2246    Lab Status: Final result Specimen: Blood from Arm, Left Updated: 10/14/24 2306     Protime 12.5 seconds      INR 0.87    Narrative:      INR Therapeutic Range    Indication                                             INR Range      Atrial Fibrillation                                               2.0-3.0  Hypercoagulable State                                    2.0.2.3  Left Ventricular Asist Device                            2.0-3.0  Mechanical Heart Valve                                  -    Aortic(with afib, MI, embolism, HF, LA enlargement,    and/or coagulopathy)                                     2.0-3.0 (2.5-3.5)     Mitral                                                             2.5-3.5  Prosthetic/Bioprosthetic Heart Valve               2.0-3.0  Venous thromboembolism (VTE: VT, PE        2.0-3.0    APTT [[id number]]  (Normal) Collected: 10/14/24 2246    Lab Status: Final result Specimen: Blood from Arm, Left Updated: 10/14/24 2306     PTT 27 seconds     CBC and differential [[id number]]  (Abnormal) Collected: 10/14/24 2246    Lab Status: Final result Specimen: Blood from Arm, Left Updated: 10/14/24 2250     WBC 7.48 Thousand/uL      RBC 5.28 Million/uL      Hemoglobin 13.8 g/dL      Hematocrit 43.1 %      MCV 82 fL      MCH 26.1 pg      MCHC 32.0 g/dL      RDW 12.9 %      MPV 10.5 fL      Platelets 376 Thousands/uL      nRBC 0 /100 WBCs      Segmented % 70 %      Immature Grans % 0 %      Lymphocytes % 23 %      Monocytes % 6 %      Eosinophils Relative 1 %      Basophils Relative 0 %      Absolute Neutrophils 5.16 Thousands/µL      Absolute Immature Grans 0.02 Thousand/uL      Absolute Lymphocytes 1.74 Thousands/µL      Absolute Monocytes 0.45 Thousand/µL      Eosinophils Absolute 0.08 Thousand/µL      Basophils Absolute 0.03 Thousands/µL             CT head without contrast   Final Interpretation by  Marlon Herzog MD (10/14 2212)      No acute intracranial abnormality.                  Workstation performed: [id number]             Procedures    ED Medication and Procedure Management   Prior to Admission Medications   Prescriptions Last Dose Informant Patient Reported? Taking?   D3-50 1.25 MG (46763 UT) capsule  Self No No   Sig: TAKE 1 CAPSULE (50,000 UNITS TOTAL) BY MOUTH ONCE A WEEK FOR 24 DOSES   NIFEdipine ER (ADALAT CC) 60 MG 24 hr tablet   No No   Sig: TAKE 1 TABLET BY MOUTH EVERY DAY   aluminum-magnesium hydroxide-simethicone (MAALOX) 200-200-20 MG/5ML SUSP  Self No No   Sig: Take 30 mL by mouth 4 (four) times a day (before meals and at bedtime)   Patient not taking: Reported on 9/20/2024      Facility-Administered Medications: None     Discharge Medication List as of 10/15/2024  2:41 AM        CONTINUE these medications which have NOT CHANGED    Details   aluminum-magnesium hydroxide-simethicone (MAALOX) 200-200-20 MG/5ML SUSP Take 30 mL by mouth 4 (four) times a day (before meals and at bedtime), Starting Tue 8/20/2024, Normal      D3-50 1.25 MG (56342 UT) capsule TAKE 1 CAPSULE (50,000 UNITS TOTAL) BY MOUTH ONCE A WEEK FOR 24 DOSES, Normal      NIFEdipine ER (ADALAT CC) 60 MG 24 hr tablet TAKE 1 TABLET BY MOUTH EVERY DAY, Starting Mon 10/14/2024, Normal           No discharge procedures on file.  ED SEPSIS DOCUMENTATION   Time reflects when diagnosis was documented in both MDM as applicable and the Disposition within this note       Time User Action Codes Description Comment    10/15/2024 12:47 AM Jeffry Solano [R51.9] Headache     10/15/2024 12:47 AM Jeffry Solano [R73.09] Elevated glucose                  Jeffry Solano PA-C  10/15/24 0560

## 2024-10-15 NOTE — DISCHARGE INSTRUCTIONS
Follow-up with your PCP for continued monitoring of headache and elevated glucose.  If any symptoms worsen or new symptoms appear return to the ER.

## 2024-10-17 ENCOUNTER — APPOINTMENT (OUTPATIENT)
Age: 59
End: 2024-10-17
Payer: COMMERCIAL

## 2024-10-17 ENCOUNTER — OFFICE VISIT (OUTPATIENT)
Age: 59
End: 2024-10-17
Payer: COMMERCIAL

## 2024-10-17 VITALS
OXYGEN SATURATION: 98 % | HEIGHT: 62 IN | HEART RATE: 78 BPM | WEIGHT: 166.8 LBS | TEMPERATURE: 98 F | DIASTOLIC BLOOD PRESSURE: 78 MMHG | RESPIRATION RATE: 16 BRPM | SYSTOLIC BLOOD PRESSURE: 118 MMHG | BODY MASS INDEX: 30.69 KG/M2

## 2024-10-17 DIAGNOSIS — R73.03 PREDIABETES: ICD-10-CM

## 2024-10-17 DIAGNOSIS — R51.9 NONINTRACTABLE EPISODIC HEADACHE, UNSPECIFIED HEADACHE TYPE: ICD-10-CM

## 2024-10-17 DIAGNOSIS — E87.6 HYPOKALEMIA: ICD-10-CM

## 2024-10-17 DIAGNOSIS — I10 ESSENTIAL HYPERTENSION: ICD-10-CM

## 2024-10-17 DIAGNOSIS — E87.6 HYPOKALEMIA: Primary | ICD-10-CM

## 2024-10-17 LAB
ANION GAP SERPL CALCULATED.3IONS-SCNC: 11 MMOL/L (ref 4–13)
BUN SERPL-MCNC: 17 MG/DL (ref 5–25)
CALCIUM SERPL-MCNC: 9.6 MG/DL (ref 8.4–10.2)
CHLORIDE SERPL-SCNC: 101 MMOL/L (ref 96–108)
CO2 SERPL-SCNC: 28 MMOL/L (ref 21–32)
CREAT SERPL-MCNC: 1.22 MG/DL (ref 0.6–1.3)
GFR SERPL CREATININE-BSD FRML MDRD: 47 ML/MIN/1.73SQ M
GLUCOSE P FAST SERPL-MCNC: 110 MG/DL (ref 65–99)
POTASSIUM SERPL-SCNC: 3.6 MMOL/L (ref 3.5–5.3)
SODIUM SERPL-SCNC: 140 MMOL/L (ref 135–147)

## 2024-10-17 PROCEDURE — 36415 COLL VENOUS BLD VENIPUNCTURE: CPT

## 2024-10-17 PROCEDURE — 80048 BASIC METABOLIC PNL TOTAL CA: CPT

## 2024-10-17 PROCEDURE — 99214 OFFICE O/P EST MOD 30 MIN: CPT

## 2024-10-17 NOTE — PROGRESS NOTES
Ambulatory Visit  Name: Fartun Craignab      : 1965      MRN: [id number]  Encounter Provider: Kristine Valentin PA-C  Encounter Date: 10/17/2024   Encounter department: [facility] [place]    Assessment & Plan  Hypokalemia  Patient will get a repeat BMP today to ensure her hypokalemia resolved.  Will call with the results and next steps if any treatment is needed.  Orders:    Basic metabolic panel; Future    Nonintractable episodic headache, unspecified headache type  Headache is improving, but there is some residual fogginess and mild headache today.  Continue with the Tylenol, avoid NSAIDs due to her high blood pressure and history of CKD.  Stay well-hydrated, drink electrolyte drinks that do not have sugar in them return if headaches become persistent.  As we may need to consider if she has developed migraines.  Neuroexam was completely intact today.    Patient needs an FMLA form completed for her work since she is taking more than 8 days off.  She is requesting to be off until , which is reasonable at this point.  FMLA paperwork to be completed and patient will be called when this is done.       Prediabetes  Patient declines A1c testing today, she has lab work upcoming for her PCP and she will get labs at that time.  She will have a BMP today which will get a fasting glucose because she has not eaten today yet.       Essential hypertension  Blood pressure greatly improved from the ER visit, most likely due to the headache rather than the blood pressure causing the headache.  Continue on current dose of nifedipine, low-salt diet.            History of Present Illness     HPI    Pt is here for follow up after an ER visit on  (3 days ago) for headache and elevated blood sugar.  PMH of CKD, HDL, HTN, and prediabetes.    She had associated photophobia with 7/10 pain.  She had CT of the head which was negative for intracranial abnormality.  She had no recent  trauma and normal neuro exam.  She was given migraine cocktail and had improvement of her symptoms.  BP was extremely elevated at 202/97.  Flu/COVID was negative.  Potassium was low and she was given repletion in the ED.  BS was 239, but not fasting.      Today the pt reports she does feel better.  She did recently have a trip to [place].  She declines A1c testing today.  She still has some headache and using Tylenol.  She is drinking plenty of fluids.  Hurts on the right posterior scalp.  Patient is not having chest pain, palpitations, or other concerning symptoms for hypokalemia.    Review of Systems   Constitutional: Negative.    Eyes:  Negative for visual disturbance.   Respiratory: Negative.     Cardiovascular: Negative.    Musculoskeletal:  Negative for gait problem.   Neurological:  Positive for headaches. Negative for dizziness, seizures, syncope, facial asymmetry, speech difficulty, weakness and light-headedness.   All other systems reviewed and are negative.    Past Medical History:   Diagnosis Date    Chronic kidney disease     High blood pressure     High cholesterol     Hypertension 14yr ago    Pre-diabetes      Past Surgical History:   Procedure Laterality Date    BREAST CYST EXCISION Left 01/01/1980    neg    COLONOSCOPY       Family History   Problem Relation Age of Onset    Diabetes Mother     Heart disease Mother     Alzheimer's disease Father     Hypertension Father     No Known Problems Sister     No Known Problems Sister     No Known Problems Sister     No Known Problems Sister     No Known Problems Daughter     No Known Problems Daughter     No Known Problems Maternal Grandmother     No Known Problems Paternal Grandmother     No Known Problems Maternal Aunt     No Known Problems Maternal Aunt     No Known Problems Paternal Aunt     Breast cancer Other 66    Breast cancer Other 66    Colon cancer Neg Hx     Ovarian cancer Neg Hx     Uterine cancer Neg Hx     Cervical cancer Neg Hx      Social  "History     Tobacco Use    Smoking status: Never     Passive exposure: Never    Smokeless tobacco: Never   Vaping Use    Vaping status: Never Used   Substance and Sexual Activity    Alcohol use: Yes     Alcohol/week: 2.0 standard drinks of alcohol     Types: 2 Glasses of wine per week     Comment: socially    Drug use: No    Sexual activity: Yes     Partners: Male     Birth control/protection: Male Sterilization     Current Outpatient Medications on File Prior to Visit   Medication Sig    D3-50 1.25 MG (14225 UT) capsule TAKE 1 CAPSULE (50,000 UNITS TOTAL) BY MOUTH ONCE A WEEK FOR 24 DOSES    NIFEdipine ER (ADALAT CC) 60 MG 24 hr tablet TAKE 1 TABLET BY MOUTH EVERY DAY    aluminum-magnesium hydroxide-simethicone (MAALOX) 200-200-20 MG/5ML SUSP Take 30 mL by mouth 4 (four) times a day (before meals and at bedtime) (Patient not taking: Reported on 9/20/2024)     No Known Allergies  Immunization History   Administered Date(s) Administered    COVID-19 PFIZER VACCINE 0.3 ML IM 05/30/2021, 06/20/2021, 12/22/2021    INFLUENZA 08/31/2017, 01/01/2021, 01/01/2021    Influenza Quadrivalent Preservative Free 3 years and older IM 12/22/2021     Objective     /78 (BP Location: Left arm, Patient Position: Sitting, Cuff Size: Large)   Pulse 78   Temp 98 °F (36.7 °C) (Tympanic)   Resp 16   Ht 5' 2\" (1.575 m)   Wt 75.7 kg (166 lb 12.8 oz)   SpO2 98%   BMI 30.51 kg/m²     Physical Exam  Vitals and nursing note reviewed.   Constitutional:       General: She is not in acute distress.     Appearance: Normal appearance.   HENT:      Head: Normocephalic and atraumatic.      Right Ear: Hearing and external ear normal.      Left Ear: Hearing and external ear normal.      Nose: Nose normal.      Mouth/Throat:      Lips: Pink.      Mouth: Mucous membranes are moist.      Dentition: Normal dentition.      Pharynx: Oropharynx is clear. Uvula midline.   Eyes:      General: Lids are normal.   Cardiovascular:      Rate and Rhythm: " Normal rate and regular rhythm.      Heart sounds: Normal heart sounds.   Pulmonary:      Effort: Pulmonary effort is normal. No respiratory distress.      Breath sounds: Normal breath sounds.   Musculoskeletal:         General: No tenderness, deformity or signs of injury.   Skin:     General: Skin is warm and dry.   Neurological:      Mental Status: She is alert and oriented to person, place, and time.      GCS: GCS eye subscore is 4. GCS verbal subscore is 5. GCS motor subscore is 6.      Cranial Nerves: Cranial nerves 2-12 are intact.      Motor: Motor function is intact.      Coordination: Coordination is intact.      Gait: Gait is intact.   Psychiatric:         Mood and Affect: Mood normal.         Behavior: Behavior normal.

## 2024-10-17 NOTE — ASSESSMENT & PLAN NOTE
Patient declines A1c testing today, she has lab work upcoming for her PCP and she will get labs at that time.  She will have a BMP today which will get a fasting glucose because she has not eaten today yet.

## 2024-10-17 NOTE — ASSESSMENT & PLAN NOTE
Blood pressure greatly improved from the ER visit, most likely due to the headache rather than the blood pressure causing the headache.  Continue on current dose of nifedipine, low-salt diet.

## 2024-10-21 ENCOUNTER — TELEPHONE (OUTPATIENT)
Age: 59
End: 2024-10-21

## 2024-10-21 NOTE — TELEPHONE ENCOUNTER
Spoke to pt she will  disability form.  Amelia a copy to scan, put the original in reception area for   N/c

## 2024-10-21 NOTE — TELEPHONE ENCOUNTER
PT   SAW  LUCINDA    10/17  SHE  IS  DOING  FMLA  PAPERWORK  FOR  HER DOESN'T   NEED  TO  TURN  IN  TILL 11/4   BUT  NOW  SHE  JUST NEEDS  A  LETTER  FOR   WORK    THE  ER   GAVE  HER   A NOTE   TO  BE  OUT  OF  WORK  TILL  10/22   NOW  SHE  NEEDS  A  NOTE  FROM  US  SAYING  SHE  WILL  BE  OFF  AND  TO  GO  BACK   10/28/24      CALL   WHEN  READY  REALLY  NEEDS  THE   LETTER  TOMORROW   MORNING  ASAP

## 2024-10-30 ENCOUNTER — HOSPITAL ENCOUNTER (OUTPATIENT)
Dept: MRI IMAGING | Facility: CLINIC | Age: 59
Discharge: HOME | End: 2024-10-30
Payer: COMMERCIAL

## 2024-10-30 DIAGNOSIS — M25.461 EFFUSION OF RIGHT KNEE: ICD-10-CM

## 2024-10-30 DIAGNOSIS — M23.91 INTERNAL DERANGEMENT OF KNEE, RIGHT: ICD-10-CM

## 2024-10-30 PROCEDURE — 73721 MRI JNT OF LWR EXTRE W/O DYE: CPT

## 2024-11-04 ENCOUNTER — OFFICE VISIT (OUTPATIENT)
Dept: OBGYN CLINIC | Facility: CLINIC | Age: 59
End: 2024-11-04
Payer: COMMERCIAL

## 2024-11-04 ENCOUNTER — TELEPHONE (OUTPATIENT)
Dept: OBGYN CLINIC | Facility: CLINIC | Age: 59
End: 2024-11-04

## 2024-11-04 VITALS
DIASTOLIC BLOOD PRESSURE: 88 MMHG | HEART RATE: 85 BPM | WEIGHT: 166 LBS | SYSTOLIC BLOOD PRESSURE: 131 MMHG | HEIGHT: 62 IN | BODY MASS INDEX: 30.55 KG/M2

## 2024-11-04 DIAGNOSIS — S83.241D OTHER TEAR OF MEDIAL MENISCUS OF RIGHT KNEE AS CURRENT INJURY, SUBSEQUENT ENCOUNTER: Primary | ICD-10-CM

## 2024-11-04 DIAGNOSIS — M25.561 ACUTE PAIN OF RIGHT KNEE: ICD-10-CM

## 2024-11-04 DIAGNOSIS — M25.461 EFFUSION OF RIGHT KNEE: ICD-10-CM

## 2024-11-04 PROCEDURE — 99214 OFFICE O/P EST MOD 30 MIN: CPT | Performed by: ORTHOPAEDIC SURGERY

## 2024-11-04 RX ORDER — CHLORHEXIDINE GLUCONATE 40 MG/ML
SOLUTION TOPICAL DAILY PRN
Status: CANCELLED | OUTPATIENT
Start: 2024-11-04

## 2024-11-04 RX ORDER — CHLORHEXIDINE GLUCONATE ORAL RINSE 1.2 MG/ML
15 SOLUTION DENTAL ONCE
Status: CANCELLED | OUTPATIENT
Start: 2024-11-04 | End: 2024-11-04

## 2024-11-04 NOTE — PROGRESS NOTES
Patient Name:  Fartun Hurley  MRN:  [id number]    Assessment & Plan     1. Other tear of medial meniscus of right knee as current injury, subsequent encounter  -     Case request operating room: MENISCECTOMY MEDIAL- Right knee arthroscopic partial medial menisectomy; Standing  -     Ambulatory referral to Family Practice; Future  -     CBC and Platelet; Future  -     Comprehensive metabolic panel; Future  -     EKG 12 lead; Future  -     XR chest pa and lateral; Future; Expected date: 11/04/2024  -     Case request operating room: MENISCECTOMY MEDIAL- Right knee arthroscopic partial medial menisectomy  2. Effusion of right knee  3. Acute pain of right knee    Right knee medial meniscus tear  X-rays and MRI reviewed with patient  Non operative management discussed including outpatient PT to work on strengthening, stretching and range of motion, activity modification, OTC topical and oral analgesics, and possible injection therapy.    Surgical management by means of Right knee arthroscopic partial medial menisectomy  Risks of surgical management including but not limited to anesthesia complications, infection, damage to nerves and blood vessels, DVT, PE, loss of life or limb, postoperative stiffness, recurrent meniscal tearing, posttraumatic arthritis, residual pain, need for subsequent surgery, persistent locking symptoms from cartilage/preexisting osteoarthritis, persistent pain from preexisting osteoarthritis  Discussed intraoperative findings, outpatient PT, return to driving and unrestricted activity, return to work  At this time, patient wished to proceed with surgical management to decrease pain and improve function.   Will require pre operative labs, CXR, and EKG with PCP pre op risk stratification  Patient will tentatively go to the OR on 11/12/2024 for Right knee arthroscopic partial medial menisectomy   Follow up post operatively        History of the Present Illness   Fartun Hurley is a  "59 y.o. female with Right knee internal derangement. Today, patient reports she continues to have pain with increased function. She locates her pain to the medial aspect of the knee with associated mechanical symptoms. She has been using neoprene knee sleeve for support. She has pain with increased function including standing after prolonged sitting.         Review of Systems     Review of Systems   Constitutional:  Negative for chills and fever.   HENT:  Negative for congestion.    Respiratory:  Negative for cough, chest tightness and shortness of breath.    Cardiovascular:  Negative for chest pain and palpitations.   Gastrointestinal:  Negative for abdominal pain.   Endocrine: Negative for cold intolerance and heat intolerance.   Neurological:  Negative for syncope.   Psychiatric/Behavioral:  Negative for confusion.        Physical Exam     /88   Pulse 85   Ht 5' 2\" (1.575 m)   Wt 75.3 kg (166 lb)   BMI 30.36 kg/m²     Right Knee  Range of motion from 0 to 125 degrees with mild discomfort.    There is small effusion.    There is tenderness over the medial joint line.    The patient is able to perform a straight leg raise and 5/5 quad strength    Positive Avery test  Varus stress testing reveals no pain or instability at 0 and 30 degrees   Valgus stress testing reveals no pain or instability at 0 and 30 degrees  The patient is neurovascular intact distally.      Data Review     I have personally reviewed pertinent films in PACS, and my interpretation follows.    X-rays taken 9/10/2024 of Right knee demonstrate no fracture or dislocation. Joint spaces well-maintained without significant degenerative changes.     MRI performed 10/30/2024 of Right knee demonstrates horizontal medial meniscus tear at posterior horn. Minimal degenerative changes noted. Small joint effusion.     Social History     Tobacco Use    Smoking status: Never     Passive exposure: Never    Smokeless tobacco: Never   Vaping Use    " Vaping status: Never Used   Substance Use Topics    Alcohol use: Yes     Alcohol/week: 2.0 standard drinks of alcohol     Types: 2 Glasses of wine per week     Comment: socially    Drug use: No           Procedures  None     Robin Zavala DO

## 2024-11-04 NOTE — H&P (VIEW-ONLY)
Patient Name:  Fartun Hurley  MRN:  [id number]    Assessment & Plan     1. Other tear of medial meniscus of right knee as current injury, subsequent encounter  -     Case request operating room: MENISCECTOMY MEDIAL- Right knee arthroscopic partial medial menisectomy; Standing  -     Ambulatory referral to Family Practice; Future  -     CBC and Platelet; Future  -     Comprehensive metabolic panel; Future  -     EKG 12 lead; Future  -     XR chest pa and lateral; Future; Expected date: 11/04/2024  -     Case request operating room: MENISCECTOMY MEDIAL- Right knee arthroscopic partial medial menisectomy  2. Effusion of right knee  3. Acute pain of right knee    Right knee medial meniscus tear  X-rays and MRI reviewed with patient  Non operative management discussed including outpatient PT to work on strengthening, stretching and range of motion, activity modification, OTC topical and oral analgesics, and possible injection therapy.    Surgical management by means of Right knee arthroscopic partial medial menisectomy  Risks of surgical management including but not limited to anesthesia complications, infection, damage to nerves and blood vessels, DVT, PE, loss of life or limb, postoperative stiffness, recurrent meniscal tearing, posttraumatic arthritis, residual pain, need for subsequent surgery, persistent locking symptoms from cartilage/preexisting osteoarthritis, persistent pain from preexisting osteoarthritis  Discussed intraoperative findings, outpatient PT, return to driving and unrestricted activity, return to work  At this time, patient wished to proceed with surgical management to decrease pain and improve function.   Will require pre operative labs, CXR, and EKG with PCP pre op risk stratification  Patient will tentatively go to the OR on 11/12/2024 for Right knee arthroscopic partial medial menisectomy   Follow up post operatively        History of the Present Illness   Fartun Hurley is a  "59 y.o. female with Right knee internal derangement. Today, patient reports she continues to have pain with increased function. She locates her pain to the medial aspect of the knee with associated mechanical symptoms. She has been using neoprene knee sleeve for support. She has pain with increased function including standing after prolonged sitting.         Review of Systems     Review of Systems   Constitutional:  Negative for chills and fever.   HENT:  Negative for congestion.    Respiratory:  Negative for cough, chest tightness and shortness of breath.    Cardiovascular:  Negative for chest pain and palpitations.   Gastrointestinal:  Negative for abdominal pain.   Endocrine: Negative for cold intolerance and heat intolerance.   Neurological:  Negative for syncope.   Psychiatric/Behavioral:  Negative for confusion.        Physical Exam     /88   Pulse 85   Ht 5' 2\" (1.575 m)   Wt 75.3 kg (166 lb)   BMI 30.36 kg/m²     Right Knee  Range of motion from 0 to 125 degrees with mild discomfort.    There is small effusion.    There is tenderness over the medial joint line.    The patient is able to perform a straight leg raise and 5/5 quad strength    Positive Avery test  Varus stress testing reveals no pain or instability at 0 and 30 degrees   Valgus stress testing reveals no pain or instability at 0 and 30 degrees  The patient is neurovascular intact distally.      Data Review     I have personally reviewed pertinent films in PACS, and my interpretation follows.    X-rays taken 9/10/2024 of Right knee demonstrate no fracture or dislocation. Joint spaces well-maintained without significant degenerative changes.     MRI performed 10/30/2024 of Right knee demonstrates horizontal medial meniscus tear at posterior horn. Minimal degenerative changes noted. Small joint effusion.     Social History     Tobacco Use    Smoking status: Never     Passive exposure: Never    Smokeless tobacco: Never   Vaping Use    " Vaping status: Never Used   Substance Use Topics    Alcohol use: Yes     Alcohol/week: 2.0 standard drinks of alcohol     Types: 2 Glasses of wine per week     Comment: socially    Drug use: No           Procedures  None     Robin Zavala DO

## 2024-11-04 NOTE — TELEPHONE ENCOUNTER
Pt scheduled for surgery with Dr. Zavala on 11/12/24 at [facility].     All surgery instructions were reviewed with pt. She was advised NPO after midnight,  to take her home after her procedure and use of surgical soap.     She is scheduled for MC with Dr. Soto on 11/7/24 at 4:20. She will get her pre-op testing done tomorrow 11/5/24.     Pt was given my direct number to call, [phone], with questions.

## 2024-11-04 NOTE — LETTER
November 4, 2024     Patient: Fartun Hurley  YOB: 1965  Date of Visit: 11/4/2024      To Whom it May Concern:    Fartun Hurley is under my professional care. Fartun was seen in my office on 11/4/2024. Fartun is scheduled for surgery on 11/12/24 and is to be out of work following this and will be re-evaluated post-operatively.     If you have any questions or concerns, please don't hesitate to call.         Sincerely,          Robin Zavala,         CC: No Recipients

## 2024-11-05 ENCOUNTER — APPOINTMENT (OUTPATIENT)
Dept: LAB | Facility: HOSPITAL | Age: 59
End: 2024-11-05
Payer: COMMERCIAL

## 2024-11-05 ENCOUNTER — HOSPITAL ENCOUNTER (OUTPATIENT)
Dept: RADIOLOGY | Facility: HOSPITAL | Age: 59
Discharge: HOME | End: 2024-11-05
Payer: COMMERCIAL

## 2024-11-05 ENCOUNTER — OFFICE VISIT (OUTPATIENT)
Dept: LAB | Facility: HOSPITAL | Age: 59
End: 2024-11-05
Payer: COMMERCIAL

## 2024-11-05 DIAGNOSIS — S83.241D OTHER TEAR OF MEDIAL MENISCUS OF RIGHT KNEE AS CURRENT INJURY, SUBSEQUENT ENCOUNTER: ICD-10-CM

## 2024-11-05 LAB
ALBUMIN SERPL BCG-MCNC: 4.6 G/DL (ref 3.5–5)
ALP SERPL-CCNC: 73 U/L (ref 34–104)
ALT SERPL W P-5'-P-CCNC: 14 U/L (ref 7–52)
ANION GAP SERPL CALCULATED.3IONS-SCNC: 9 MMOL/L (ref 4–13)
AST SERPL W P-5'-P-CCNC: 15 U/L (ref 13–39)
ATRIAL RATE: 84 BPM
BILIRUB SERPL-MCNC: 0.5 MG/DL (ref 0.2–1)
BUN SERPL-MCNC: 16 MG/DL (ref 5–25)
CALCIUM SERPL-MCNC: 9.4 MG/DL (ref 8.4–10.2)
CHLORIDE SERPL-SCNC: 105 MMOL/L (ref 96–108)
CO2 SERPL-SCNC: 28 MMOL/L (ref 21–32)
CREAT SERPL-MCNC: 1.27 MG/DL (ref 0.6–1.3)
ERYTHROCYTE [DISTWIDTH] IN BLOOD BY AUTOMATED COUNT: 13 % (ref 11.6–15.1)
GFR SERPL CREATININE-BSD FRML MDRD: 45 ML/MIN/1.73SQ M
GLUCOSE P FAST SERPL-MCNC: 126 MG/DL (ref 65–99)
HCT VFR BLD AUTO: 40.2 % (ref 34.8–46.1)
HGB BLD-MCNC: 12.9 G/DL (ref 11.5–15.4)
MCH RBC QN AUTO: 26.6 PG (ref 26.8–34.3)
MCHC RBC AUTO-ENTMCNC: 32.1 G/DL (ref 31.4–37.4)
MCV RBC AUTO: 83 FL (ref 82–98)
P AXIS: 15 DEGREES
PLATELET # BLD AUTO: 336 THOUSANDS/UL (ref 149–390)
PMV BLD AUTO: 10.2 FL (ref 8.9–12.7)
POTASSIUM SERPL-SCNC: 3.7 MMOL/L (ref 3.5–5.3)
PR INTERVAL: 166 MS
PROT SERPL-MCNC: 7.4 G/DL (ref 6.4–8.4)
QRS AXIS: -2 DEGREES
QRSD INTERVAL: 64 MS
QT INTERVAL: 380 MS
QTC INTERVAL: 450 MS
RBC # BLD AUTO: 4.85 MILLION/UL (ref 3.81–5.12)
SODIUM SERPL-SCNC: 142 MMOL/L (ref 135–147)
T WAVE AXIS: 3 DEGREES
VENTRICULAR RATE: 84 BPM
WBC # BLD AUTO: 3.83 THOUSAND/UL (ref 4.31–10.16)

## 2024-11-05 PROCEDURE — 85027 COMPLETE CBC AUTOMATED: CPT

## 2024-11-05 PROCEDURE — 93010 ELECTROCARDIOGRAM REPORT: CPT | Performed by: STUDENT IN AN ORGANIZED HEALTH CARE EDUCATION/TRAINING PROGRAM

## 2024-11-05 PROCEDURE — 71046 X-RAY EXAM CHEST 2 VIEWS: CPT

## 2024-11-05 PROCEDURE — 80053 COMPREHEN METABOLIC PANEL: CPT

## 2024-11-05 PROCEDURE — 36415 COLL VENOUS BLD VENIPUNCTURE: CPT

## 2024-11-05 PROCEDURE — 93005 ELECTROCARDIOGRAM TRACING: CPT

## 2024-11-07 ENCOUNTER — TELEPHONE (OUTPATIENT)
Age: 59
End: 2024-11-07

## 2024-11-07 ENCOUNTER — CONSULT (OUTPATIENT)
Age: 59
End: 2024-11-07
Payer: COMMERCIAL

## 2024-11-07 VITALS
HEIGHT: 62 IN | HEART RATE: 85 BPM | SYSTOLIC BLOOD PRESSURE: 128 MMHG | RESPIRATION RATE: 18 BRPM | OXYGEN SATURATION: 99 % | TEMPERATURE: 97.8 F | DIASTOLIC BLOOD PRESSURE: 84 MMHG | WEIGHT: 166.4 LBS | BODY MASS INDEX: 30.62 KG/M2

## 2024-11-07 DIAGNOSIS — N18.31 STAGE 3A CHRONIC KIDNEY DISEASE (HCC): ICD-10-CM

## 2024-11-07 DIAGNOSIS — Z01.818 PREOPERATIVE EXAMINATION: Primary | ICD-10-CM

## 2024-11-07 DIAGNOSIS — I10 ESSENTIAL HYPERTENSION: ICD-10-CM

## 2024-11-07 DIAGNOSIS — S83.241D OTHER TEAR OF MEDIAL MENISCUS OF RIGHT KNEE AS CURRENT INJURY, SUBSEQUENT ENCOUNTER: ICD-10-CM

## 2024-11-07 DIAGNOSIS — R73.03 PREDIABETES: ICD-10-CM

## 2024-11-07 PROCEDURE — 99214 OFFICE O/P EST MOD 30 MIN: CPT | Performed by: STUDENT IN AN ORGANIZED HEALTH CARE EDUCATION/TRAINING PROGRAM

## 2024-11-07 NOTE — TELEPHONE ENCOUNTER
Called pt back who said someone called her about surgery. I told her that it may have been from admissions for her surgery scheduled for 11/12/24. I did remind her that she had an appt with Dr. Soto at 4:20 today.

## 2024-11-07 NOTE — ASSESSMENT & PLAN NOTE
Lab Results   Component Value Date    EGFR 45 11/05/2024    EGFR 47 10/17/2024    EGFR 46 10/14/2024    CREATININE 1.27 11/05/2024    CREATININE 1.22 10/17/2024    CREATININE 1.24 10/14/2024     Cr < 2.0. Kidney function is acceptable for surgery. Avoid nephrotoxic agents if possible.

## 2024-11-07 NOTE — PROGRESS NOTES
Pre-operative Clearance  Name: Fartun Hurley      : 1965      MRN: [id number]  Encounter Provider: Pankaj Soto MD  Encounter Date: 2024   Encounter department: [facility] [place]    Assessment & Plan  Preoperative examination  Patient is medically optimized for upcoming procedure and does not need further workup. RCRI = 0 (very low risk). Preoperative testing reviewed. EKG NSR with possible left atrial enlargement. CXR with no acute cardiopulmonary disease. CBC, CMP stable with acceptable kidney function. Able to complete 4+ METs of activity. No recent ALMAZAN or angina. Not on any antiplatelets/anticoagulants.       Other tear of medial meniscus of right knee as current injury, subsequent encounter  Will be undergoing R knee arthroscopic partial medial meniscectomy on .    Orders:    Ambulatory referral to Family Practice    Essential hypertension  BP well controlled.  Continue current dose of nifedipine through surgery       Stage 3a chronic kidney disease (HCC)  Lab Results   Component Value Date    EGFR 45 2024    EGFR 47 10/17/2024    EGFR 46 10/14/2024    CREATININE 1.27 2024    CREATININE 1.22 10/17/2024    CREATININE 1.24 10/14/2024     Cr < 2.0. Kidney function is acceptable for surgery. Avoid nephrotoxic agents if possible.       Prediabetes  Most recent A1c. Diet controlled. Not on any antihyperglycemic agents that would be required to be held for surgery.       Pre-operative Clearance:     Revised Cardiac Risk Index:  RCI RISK CLASS I (0 risk factors, risk of major cardiac complications approximately 0.5%)    Clearance:  Patient is medically optimized (CLEARED) for proposed surgery without any additional cardiac testing.      Medication Instructions:   - Avoid herbs or non-directed vitamins one week prior to surgery    - Avoid aspirin containing medications or non-steroidal anti-inflammatory drugs one week preceding surgery    - May take  tylenol for pain up until the night before surgery    - Calcium channel blockers: Continue to take this medication on your normal schedule.      Depression Screening and Follow-up Plan: Patient was screened for depression during today's encounter. They screened negative with a PHQ-2 score of 0.      History of Present Illness     Fartun Hurley is a 58 yo F with PMH of HTN, HLD, preDM who presents today for preoperative exam in anticipation for R knee arthroscopic partial medial meniscectomy with Dr. Zavala on 11/12. She has history of eye surgery under general anesthesia without complication. No perioperative bleeding or infection. She is able to complete 4+ METs of activity though limited now 2/2 meniscal injury. She denies any recent angina or ALMAZAN. She is not taking any antiplatelets/anticoagulants.      Review of Systems   Constitutional:  Negative for appetite change, chills and fever.   HENT:  Negative for congestion and sore throat.    Eyes:  Negative for visual disturbance.   Respiratory:  Negative for cough and shortness of breath.    Cardiovascular:  Negative for chest pain and leg swelling.   Gastrointestinal:  Negative for abdominal pain, constipation, diarrhea and nausea.   Genitourinary:  Negative for difficulty urinating and dysuria.   Musculoskeletal:  Positive for gait problem. Negative for arthralgias and myalgias.        Right knee pain   Skin:  Negative for rash.   Neurological:  Negative for dizziness, light-headedness and headaches.   Psychiatric/Behavioral:  Negative for confusion.      Past Medical History   Past Medical History:   Diagnosis Date    Chronic kidney disease     High blood pressure     High cholesterol     Hypertension 14yr ago    Pre-diabetes      Past Surgical History:   Procedure Laterality Date    BREAST CYST EXCISION Left 01/01/1980    neg    COLONOSCOPY       Family History   Problem Relation Age of Onset    Diabetes Mother     Heart disease Mother      "Alzheimer's disease Father     Hypertension Father     No Known Problems Sister     No Known Problems Sister     No Known Problems Sister     No Known Problems Sister     No Known Problems Daughter     No Known Problems Daughter     No Known Problems Maternal Grandmother     No Known Problems Paternal Grandmother     No Known Problems Maternal Aunt     No Known Problems Maternal Aunt     No Known Problems Paternal Aunt     Breast cancer Other 66    Breast cancer Other 66    Colon cancer Neg Hx     Ovarian cancer Neg Hx     Uterine cancer Neg Hx     Cervical cancer Neg Hx      Social History     Tobacco Use    Smoking status: Never     Passive exposure: Never    Smokeless tobacco: Never   Vaping Use    Vaping status: Never Used   Substance and Sexual Activity    Alcohol use: Yes     Alcohol/week: 2.0 standard drinks of alcohol     Types: 2 Glasses of wine per week     Comment: socially    Drug use: No    Sexual activity: Yes     Partners: Male     Birth control/protection: Male Sterilization     Current Outpatient Medications on File Prior to Visit   Medication Sig    D3-50 1.25 MG (59575 UT) capsule TAKE 1 CAPSULE (50,000 UNITS TOTAL) BY MOUTH ONCE A WEEK FOR 24 DOSES    NIFEdipine ER (ADALAT CC) 60 MG 24 hr tablet TAKE 1 TABLET BY MOUTH EVERY DAY     No Known Allergies  Objective     /84 (BP Location: Right arm, Patient Position: Sitting, Cuff Size: Standard)   Pulse 85   Temp 97.8 °F (36.6 °C) (Tympanic)   Resp 18   Ht 5' 2\" (1.575 m)   Wt 75.5 kg (166 lb 6.4 oz)   SpO2 99%   BMI 30.43 kg/m²     Physical Exam  Constitutional:       General: She is not in acute distress.  HENT:      Right Ear: Tympanic membrane, ear canal and external ear normal.      Left Ear: Tympanic membrane, ear canal and external ear normal.      Nose: Nose normal.      Mouth/Throat:      Mouth: Mucous membranes are moist.      Pharynx: Oropharynx is clear.   Eyes:      Conjunctiva/sclera: Conjunctivae normal.      Pupils: Pupils " are equal, round, and reactive to light.   Neck:      Thyroid: No thyroid mass or thyroid tenderness.   Cardiovascular:      Rate and Rhythm: Normal rate and regular rhythm.      Heart sounds: Normal heart sounds.   Pulmonary:      Effort: Pulmonary effort is normal.      Breath sounds: Normal breath sounds.   Abdominal:      General: There is no distension.      Tenderness: There is no abdominal tenderness.   Musculoskeletal:      Cervical back: Neck supple.      Right knee: Decreased range of motion.      Left knee: Normal range of motion.      Right lower leg: No edema.      Left lower leg: No edema.   Skin:     General: Skin is warm and dry.   Neurological:      Mental Status: She is alert.      Sensory: Sensation is intact.      Motor: Motor function is intact.      Gait: Gait abnormal.   Psychiatric:         Mood and Affect: Mood normal.         Speech: Speech normal.         Behavior: Behavior normal. Behavior is cooperative.           Pankaj Soto MD

## 2024-11-07 NOTE — TELEPHONE ENCOUNTER
Patient called stating someone call her to schedule time for surgery. Advise patient she was calling General Surgery. Patient having surgery with Ortho. Provided Ortho phone number [phone]. Warm transferred patient.

## 2024-11-07 NOTE — ASSESSMENT & PLAN NOTE
Most recent A1c. Diet controlled. Not on any antihyperglycemic agents that would be required to be held for surgery.

## 2024-11-07 NOTE — LETTER
To whom it may concern:      Fartun Rodriguez Luiz (1965) is under my professional care and was seen in my office on 11/7/2024. She will be undergoing surgery on 11/12/2024.    Feel free to reach out with any questions/concerns.      Sincerely,  Pankaj Soto MD

## 2024-11-07 NOTE — TELEPHONE ENCOUNTER
Caller: Fartun     Doctor: Lucas Gabriel     Reason for call: Patient is scheduled for surgery on 11/12.    Patient received message to return call but no one answers at that number.    Please return call to patient , she has questions regarding her surgery .     Call back#: [phone]

## 2024-11-08 RX ORDER — DIPHENOXYLATE HYDROCHLORIDE AND ATROPINE SULFATE 2.5; .025 MG/1; MG/1
1 TABLET ORAL DAILY
COMMUNITY

## 2024-11-08 NOTE — PRE-PROCEDURE INSTRUCTIONS
Pre-Surgery Instructions:   Medication Instructions    D3-50 1.25 MG (90716 UT) capsule Avoid 1 week prior to surgery      multivitamin (THERAGRAN) TABS Avoid 1 week prior to surgery      NIFEdipine ER (ADALAT CC) 60 MG 24 hr tablet Take Day of Surgery; unless usually taken at night - takes at night    Omega-3 Fatty Acids (Fish Oil) 600 MG CAPS Avoid 1 week prior to surgery      Medication instructions for day surgery reviewed. Please use only a sip of water to take your instructed medications. Avoid all over the counter vitamins, supplements and NSAIDS for one week prior to surgery per anesthesia guidelines. Tylenol is ok to take as needed.     You will receive a call one business day prior to surgery with an arrival time and hospital directions. If your surgery is scheduled on a Monday, the hospital will be calling you on the Friday prior to your surgery. If you have not heard from anyone by 8pm, please call the hospital supervisor through the hospital  at [phone]. ([place] [phone] or [place] [phone]).    Do not eat or drink anything after midnight the night before your surgery, including candy, mints, lifesavers, or chewing gum. Do not drink alcohol 24hrs before your surgery. Try not to smoke at least 24hrs before your surgery.       Follow the pre surgery showering instructions as listed in the “My Surgical Experience Booklet” or otherwise provided by your surgeon's office. Do not use a blade to shave the surgical area 1 week before surgery. It is okay to use a clean electric clippers up to 24 hours before surgery. Do not apply any lotions, creams, including makeup, cologne, deodorant, or perfumes after showering on the day of your surgery. Do not use dry shampoo, hair spray, hair gel, or any type of hair products.     No contact lenses, eye make-up, or artificial eyelashes. Remove nail polish, including gel polish, and any artificial, gel, or acrylic nails if possible. Remove all  jewelry including rings and body piercing jewelry.     Wear causal clothing that is easy to take on and off. Consider your type of surgery.    Keep any valuables, jewelry, piercings at home. Please bring any specially ordered equipment (sling, braces) if indicated.    Arrange for a responsible person to drive you to and from the hospital on the day of your surgery. Please confirm the visitor policy for the day of your procedure when you receive your phone call with an arrival time.     Call the surgeon's office with any new illnesses, exposures, or additional questions prior to surgery.    Please reference your “My Surgical Experience Booklet” for additional information to prepare for your upcoming surgery.

## 2024-11-11 ENCOUNTER — ANESTHESIA EVENT (OUTPATIENT)
Dept: PERIOP | Facility: HOSPITAL | Age: 59
End: 2024-11-11
Payer: COMMERCIAL

## 2024-11-12 ENCOUNTER — HOSPITAL ENCOUNTER (OUTPATIENT)
Facility: HOSPITAL | Age: 59
Setting detail: OUTPATIENT SURGERY
Discharge: HOME | End: 2024-11-12
Attending: ORTHOPAEDIC SURGERY | Admitting: ORTHOPAEDIC SURGERY
Payer: COMMERCIAL

## 2024-11-12 ENCOUNTER — ANESTHESIA (OUTPATIENT)
Dept: PERIOP | Facility: HOSPITAL | Age: 59
End: 2024-11-12
Payer: COMMERCIAL

## 2024-11-12 VITALS
WEIGHT: 165.34 LBS | TEMPERATURE: 97.5 F | RESPIRATION RATE: 13 BRPM | HEIGHT: 62 IN | DIASTOLIC BLOOD PRESSURE: 90 MMHG | BODY MASS INDEX: 30.43 KG/M2 | SYSTOLIC BLOOD PRESSURE: 160 MMHG | OXYGEN SATURATION: 98 % | HEART RATE: 56 BPM

## 2024-11-12 DIAGNOSIS — Z98.890 S/P RIGHT KNEE ARTHROSCOPY: Primary | ICD-10-CM

## 2024-11-12 PROCEDURE — 29881 ARTHRS KNE SRG MNISECTMY M/L: CPT | Performed by: ORTHOPAEDIC SURGERY

## 2024-11-12 PROCEDURE — 29881 ARTHRS KNE SRG MNISECTMY M/L: CPT | Performed by: PHYSICIAN ASSISTANT

## 2024-11-12 RX ORDER — FENTANYL CITRATE/PF 50 MCG/ML
25 SYRINGE (ML) INJECTION
Status: DISCONTINUED | OUTPATIENT
Start: 2024-11-12 | End: 2024-11-12 | Stop reason: HOSPADM

## 2024-11-12 RX ORDER — PROPOFOL 10 MG/ML
INJECTION, EMULSION INTRAVENOUS AS NEEDED
Status: DISCONTINUED | OUTPATIENT
Start: 2024-11-12 | End: 2024-11-12

## 2024-11-12 RX ORDER — CHLORHEXIDINE GLUCONATE 40 MG/ML
SOLUTION TOPICAL DAILY PRN
Status: DISCONTINUED | OUTPATIENT
Start: 2024-11-12 | End: 2024-11-12 | Stop reason: HOSPADM

## 2024-11-12 RX ORDER — FENTANYL CITRATE 50 UG/ML
INJECTION, SOLUTION INTRAMUSCULAR; INTRAVENOUS AS NEEDED
Status: DISCONTINUED | OUTPATIENT
Start: 2024-11-12 | End: 2024-11-12

## 2024-11-12 RX ORDER — SODIUM CHLORIDE, SODIUM LACTATE, POTASSIUM CHLORIDE, CALCIUM CHLORIDE 600; 310; 30; 20 MG/100ML; MG/100ML; MG/100ML; MG/100ML
INJECTION, SOLUTION INTRAVENOUS CONTINUOUS PRN
Status: DISCONTINUED | OUTPATIENT
Start: 2024-11-12 | End: 2024-11-12

## 2024-11-12 RX ORDER — CEFAZOLIN SODIUM 2 G/50ML
2000 SOLUTION INTRAVENOUS ONCE
Status: COMPLETED | OUTPATIENT
Start: 2024-11-12 | End: 2024-11-12

## 2024-11-12 RX ORDER — CHLORHEXIDINE GLUCONATE ORAL RINSE 1.2 MG/ML
15 SOLUTION DENTAL ONCE
Status: COMPLETED | OUTPATIENT
Start: 2024-11-12 | End: 2024-11-12

## 2024-11-12 RX ORDER — SODIUM CHLORIDE, SODIUM LACTATE, POTASSIUM CHLORIDE, CALCIUM CHLORIDE 600; 310; 30; 20 MG/100ML; MG/100ML; MG/100ML; MG/100ML
125 INJECTION, SOLUTION INTRAVENOUS CONTINUOUS
Status: DISCONTINUED | OUTPATIENT
Start: 2024-11-12 | End: 2024-11-12 | Stop reason: HOSPADM

## 2024-11-12 RX ORDER — ONDANSETRON 2 MG/ML
4 INJECTION INTRAMUSCULAR; INTRAVENOUS EVERY 6 HOURS PRN
Status: DISCONTINUED | OUTPATIENT
Start: 2024-11-12 | End: 2024-11-12 | Stop reason: HOSPADM

## 2024-11-12 RX ORDER — OXYCODONE AND ACETAMINOPHEN 5; 325 MG/1; MG/1
1 TABLET ORAL ONCE
Status: COMPLETED | OUTPATIENT
Start: 2024-11-12 | End: 2024-11-12

## 2024-11-12 RX ORDER — HYDROMORPHONE HCL IN WATER/PF 6 MG/30 ML
0.2 PATIENT CONTROLLED ANALGESIA SYRINGE INTRAVENOUS
Status: DISCONTINUED | OUTPATIENT
Start: 2024-11-12 | End: 2024-11-12 | Stop reason: HOSPADM

## 2024-11-12 RX ORDER — BUPIVACAINE HYDROCHLORIDE 5 MG/ML
INJECTION, SOLUTION EPIDURAL; INTRACAUDAL AS NEEDED
Status: DISCONTINUED | OUTPATIENT
Start: 2024-11-12 | End: 2024-11-12 | Stop reason: HOSPADM

## 2024-11-12 RX ORDER — DEXAMETHASONE SODIUM PHOSPHATE 10 MG/ML
INJECTION, SOLUTION INTRAMUSCULAR; INTRAVENOUS AS NEEDED
Status: DISCONTINUED | OUTPATIENT
Start: 2024-11-12 | End: 2024-11-12

## 2024-11-12 RX ORDER — ONDANSETRON 2 MG/ML
4 INJECTION INTRAMUSCULAR; INTRAVENOUS ONCE AS NEEDED
Status: DISCONTINUED | OUTPATIENT
Start: 2024-11-12 | End: 2024-11-12 | Stop reason: HOSPADM

## 2024-11-12 RX ORDER — LIDOCAINE HYDROCHLORIDE 10 MG/ML
INJECTION, SOLUTION EPIDURAL; INFILTRATION; INTRACAUDAL; PERINEURAL AS NEEDED
Status: DISCONTINUED | OUTPATIENT
Start: 2024-11-12 | End: 2024-11-12

## 2024-11-12 RX ORDER — LIDOCAINE HYDROCHLORIDE 10 MG/ML
0.5 INJECTION, SOLUTION EPIDURAL; INFILTRATION; INTRACAUDAL; PERINEURAL ONCE AS NEEDED
Status: DISCONTINUED | OUTPATIENT
Start: 2024-11-12 | End: 2024-11-12 | Stop reason: HOSPADM

## 2024-11-12 RX ORDER — ASPIRIN 81 MG/1
81 TABLET ORAL 2 TIMES DAILY
Qty: 28 TABLET | Refills: 0 | Status: SHIPPED | OUTPATIENT
Start: 2024-11-12

## 2024-11-12 RX ORDER — ONDANSETRON 2 MG/ML
INJECTION INTRAMUSCULAR; INTRAVENOUS AS NEEDED
Status: DISCONTINUED | OUTPATIENT
Start: 2024-11-12 | End: 2024-11-12

## 2024-11-12 RX ORDER — OXYCODONE AND ACETAMINOPHEN 5; 325 MG/1; MG/1
1 TABLET ORAL EVERY 6 HOURS PRN
Qty: 10 TABLET | Refills: 0 | Status: SHIPPED | OUTPATIENT
Start: 2024-11-12

## 2024-11-12 RX ORDER — SODIUM CHLORIDE, SODIUM LACTATE, POTASSIUM CHLORIDE, AND CALCIUM CHLORIDE .6; .31; .03; .02 G/100ML; G/100ML; G/100ML; G/100ML
IRRIGANT IRRIGATION AS NEEDED
Status: DISCONTINUED | OUTPATIENT
Start: 2024-11-12 | End: 2024-11-12 | Stop reason: HOSPADM

## 2024-11-12 RX ORDER — MIDAZOLAM HYDROCHLORIDE 2 MG/2ML
INJECTION, SOLUTION INTRAMUSCULAR; INTRAVENOUS AS NEEDED
Status: DISCONTINUED | OUTPATIENT
Start: 2024-11-12 | End: 2024-11-12

## 2024-11-12 RX ORDER — IBUPROFEN 800 MG/1
TABLET, FILM COATED ORAL
Qty: 30 TABLET | Refills: 0 | Status: SHIPPED | OUTPATIENT
Start: 2024-11-12

## 2024-11-12 RX ADMIN — SODIUM CHLORIDE, SODIUM LACTATE, POTASSIUM CHLORIDE, AND CALCIUM CHLORIDE 125 ML/HR: .6; .31; .03; .02 INJECTION, SOLUTION INTRAVENOUS at 07:23

## 2024-11-12 RX ADMIN — FENTANYL CITRATE 25 MCG: 50 INJECTION, SOLUTION INTRAMUSCULAR; INTRAVENOUS at 08:39

## 2024-11-12 RX ADMIN — FENTANYL CITRATE 50 MCG: 50 INJECTION, SOLUTION INTRAMUSCULAR; INTRAVENOUS at 08:15

## 2024-11-12 RX ADMIN — PROPOFOL 150 MG: 10 INJECTION, EMULSION INTRAVENOUS at 08:00

## 2024-11-12 RX ADMIN — CHLORHEXIDINE GLUCONATE 0.12% ORAL RINSE 15 ML: 1.2 LIQUID ORAL at 07:25

## 2024-11-12 RX ADMIN — CEFAZOLIN SODIUM 2000 MG: 2 SOLUTION INTRAVENOUS at 07:56

## 2024-11-12 RX ADMIN — FENTANYL CITRATE 25 MCG: 50 INJECTION INTRAMUSCULAR; INTRAVENOUS at 09:08

## 2024-11-12 RX ADMIN — FENTANYL CITRATE 25 MCG: 50 INJECTION INTRAMUSCULAR; INTRAVENOUS at 09:13

## 2024-11-12 RX ADMIN — OXYCODONE HYDROCHLORIDE AND ACETAMINOPHEN 1 TABLET: 5; 325 TABLET ORAL at 10:01

## 2024-11-12 RX ADMIN — ONDANSETRON 4 MG: 2 INJECTION INTRAMUSCULAR; INTRAVENOUS at 08:37

## 2024-11-12 RX ADMIN — SODIUM CHLORIDE, SODIUM LACTATE, POTASSIUM CHLORIDE, AND CALCIUM CHLORIDE: .6; .31; .03; .02 INJECTION, SOLUTION INTRAVENOUS at 07:36

## 2024-11-12 RX ADMIN — LIDOCAINE HYDROCHLORIDE 50 MG: 10 INJECTION, SOLUTION EPIDURAL; INFILTRATION; INTRACAUDAL; PERINEURAL at 07:59

## 2024-11-12 RX ADMIN — DEXAMETHASONE SODIUM PHOSPHATE 10 MG: 10 INJECTION INTRAMUSCULAR; INTRAVENOUS at 08:02

## 2024-11-12 RX ADMIN — MIDAZOLAM HYDROCHLORIDE 2 MG: 1 INJECTION, SOLUTION INTRAMUSCULAR; INTRAVENOUS at 07:56

## 2024-11-12 NOTE — ANESTHESIA PREPROCEDURE EVALUATION
Procedure:  MENISCECTOMY MEDIAL- Right knee arthroscopic partial medial menisectomy (Right: Knee)    Relevant Problems   ANESTHESIA  Slow to wake up after surgery 15yrs ago, pt cannot remember type or anesthesia or surgery      CARDIO   (+) Essential hypertension   (+) Mild mitral regurgitation by prior echocardiography   (+) Mixed hyperlipidemia   (-) Chest pain   (-) ALMAZAN (dyspnea on exertion)      GI/HEPATIC   (+) Hiatal hernia   (-) Gastroesophageal reflux disease      /RENAL   (+) Chronic kidney disease, stage II (mild)      MUSCULOSKELETAL   (+) Hiatal hernia      PULMONARY   (-) Shortness of breath   (-) Sleep apnea   (-) URI (upper respiratory infection)        Physical Exam    Airway    Mallampati score: II  TM Distance: >3 FB  Neck ROM: full     Dental       Cardiovascular      Pulmonary      Other Findings  post-pubertal.      Anesthesia Plan  ASA Score- 2     Anesthesia Type- general with ASA Monitors.         Additional Monitors:     Airway Plan: LMA.           Plan Factors-Exercise tolerance (METS): >4 METS.    Chart reviewed. EKG reviewed.  Existing labs reviewed. Patient summary reviewed.                  Induction- intravenous.    Postoperative Plan-         Informed Consent- Anesthetic plan and risks discussed with patient.  I personally reviewed this patient with the CRNA. Discussed and agreed on the Anesthesia Plan with the CRNA..

## 2024-11-12 NOTE — ANESTHESIA POSTPROCEDURE EVALUATION
Post-Op Assessment Note    Last Filed PACU Vitals:  Vitals Value Taken Time   Temp 97.1 °F (36.2 °C) 11/12/24 0857   Pulse 72 11/12/24 0857   /81 11/12/24 0857   Resp 13    SpO2 98        Modified [place]:  No data recorded

## 2024-11-12 NOTE — INTERVAL H&P NOTE
H&P reviewed. After examining the patient I find no changes in the patients condition since the H&P had been written. Patient was seen and evaluated in the office where continued nonoperative vs surgical management of Right knee medial meniscus tear was discussed. In light of patients persistent knee pain despite nonoperative management, decreased function, patient would like to move forward with surgical intervention. Risks of surgical intervention discussed in the office with patient and detailed consent obtained. Patient will go to OR on 11/12/2024 with Dr Zavala for Right knee arthroscopic partial medial menisectomy.     14 point ROS in office   Musculoskeletal Right knee pain      Heart RRR  Lungs CTA BL   Abdomen Present nontender       Right Knee  Range of motion from 0 to 125 degrees with mild discomfort at terminal flexion   There is small effusion.    There is tenderness over the medial joint line.    The patient is able to perform a straight leg raise and 5/5 quad strength    Positive Avery test  Varus stress testing reveals no pain or instability at 0 and 30 degrees   Valgus stress testing reveals no pain or instability at 0 and 30 degrees  The patient is neurovascular intact distally.       Vitals:    11/12/24 0654   BP: 120/81   Pulse: 72   Resp: 14   Temp: 97.5 °F (36.4 °C)   SpO2: 99%

## 2024-11-12 NOTE — ANESTHESIA POSTPROCEDURE EVALUATION
Post-Op Assessment Note    CV Status:  Stable    Pain management: adequate       Mental Status:  Sleepy   Hydration Status:  Euvolemic   PONV Controlled:  Controlled   Airway Patency:  Patent     Post Op Vitals Reviewed: Yes    No anethesia notable event occurred.            Last Filed PACU Vitals:  Vitals Value Taken Time   Temp     Pulse     BP     Resp     SpO2         Modified [place]son:  No data recorded

## 2024-11-12 NOTE — ANESTHESIA POSTPROCEDURE EVALUATION
Post-Op Assessment Note    Last Filed PACU Vitals:  Vitals Value Taken Time   Temp 97.5 °F (36.4 °C) 11/12/24 0925   Pulse 70 11/12/24 0927   /77 11/12/24 0925   Resp 17 11/12/24 0927   SpO2 95 % 11/12/24 0927   Vitals shown include unfiled device data.    Modified [place]son:  Activity: 2 (11/12/2024 10:15 AM)  Respiration: 2 (11/12/2024 10:15 AM)  Circulation: 2 (11/12/2024 10:15 AM)  Consciousness: 2 (11/12/2024 10:15 AM)  Oxygen Saturation: 2 (11/12/2024 10:15 AM)  Modified Edenilson Score: 10 (11/12/2024 10:15 AM)

## 2024-11-12 NOTE — DISCHARGE INSTR - AVS FIRST PAGE
Knee Surgery: Postoperative Instructions  Dr. Robin Zavala  Diet    You may resume your regular diet as soon as possible  Medication    Take the pain medication as prescribed   Take pain medication with food   While taking pain medications, you may NOT operate a vehicle, heavy machinery, or appliances   While taking pain medication, you may NOT drink alcoholic beverages   If you have any reactions to your medications, stop taking them and call my office   If you are not allergic, take one aspirin 81mg twice a day to help prevent blood clots   Please keep in mind that constipation is a very common side effect of taking narcotic pain medication. Take precautions to prevent constipation:  o Drink plenty of water (6-8 glasses of 8 oz. a day)  o Avoid alcohol  o Take an over the counter stool softener (Colace or Dulcolax)  Activity    RANGE OF MOTION   _____ You may bend your knee as much as the dressings will allow     WEIGHT BEARING   _____ You may weight bear as tolerated     You may practice quadriceps muscle tightening and straight leg raises several times every hour.   Please continue to move your ankle up and down and tighten and relax your calf muscles several times every hour to help reduce swelling and prevent blood clots.   You may use your crutches for balance as needed until your first post operative visit.   The optimal position of the leg after surgery is for you to be lying flat with your ankle higher than your knee and higher than your heart, in an effort to reduce swelling.   It is important to continuously elevate your knee above your heart until your swelling is completely down.   Please keep ice applied to the knee for at least the first 72 hours or as long as pain or swelling persists. Do not apply ice directly to skin, or allow water to leak on your dressing.  Showering    You may shower 3 days after surgery unless told otherwise. DO NOT immerse the knee under water and DO NOT rub the incision.  Pad the incisions dry and place new Band-Aids over the sutures after showering.    If you have a brace, you may remove it to shower. Keep your knee straight in the shower.   You may sponge bathe for the first 2 days, taking care to keep the dressing clean and dry.  Dressing Care    Keep the dressing clean and dry.   It is normal to get some bloody wound seepage through the bandage. DO NOT BE ALARMED.    If the dressing gets soaked with wound seepage, please reinforce with a dry sterile dressing.   Loosen the ace wrap around your knee if it becomes too tight or painful.   Remove all dressings 3 days after surgery. If there is still some wound seepage, apply a fresh STERILE gauze over the incisions and secure with tape or an ace wrap.    DO NOT TOUCH OR REMOVE THE SUTURES!!  Emergency/Follow-Up   Please notify my office at [phone] if you develop any fever (101 deg or above), unexpected warmth, redness or swelling. Please call if your toes become cold, purple, numb, or there is excessive bleeding.   Please call the office within 24 hours at [phone] to schedule a follow up appt within 7-10 days from surgery.

## 2024-11-18 ENCOUNTER — TELEPHONE (OUTPATIENT)
Age: 59
End: 2024-11-18

## 2024-11-18 NOTE — TELEPHONE ENCOUNTER
Caller: patient     Doctor: Lucas    Reason for call: please contact patient when FMLA forms are ready for pickup.     Call back#: [phone]

## 2024-11-19 NOTE — PROGRESS NOTES
PT Evaluation     Today's date: 2024  Patient name: Fartun Craignab  : 1965  MRN: [id number]  Referring provider: Samantha Baker PA-C  Dx:   Encounter Diagnosis     ICD-10-CM    1. S/P right knee arthroscopy  Z98.890 Ambulatory Referral to Physical Therapy            Start Time: 1449  Stop Time: 1539  Total time in clinic (min): 50 minutes    Assessment  Impairments: abnormal or restricted ROM, activity intolerance, impaired physical strength, lacks appropriate home exercise program, pain with function, poor body mechanics, unable to perform ADL, participation limitations and activity limitations    Assessment details: Pt is a 59 y.o. F that presents to PT with s/p R knee arthroscopy. She has current limitations of R knee strength, AROM, and R hip strength. These limitations reduce her ability to ambulate longer distances, navigate stairs, and complete job duties. Manuals were effective for increasing her R knee AROM into flexion, 105->112 degrees. Pt was educated of an HEP and completed the exercises to demonstrate understanding. Pt would benefit from skilled PT to accomplish the following goals.      Goals  STG: In 4 weeks:  1. Pt will safely complete HEP independently.  2. Pt will obtain >120 degrees of R knee flexion to be able to descend stairs and have a proper gait.  LTG: To be met at discharge:  1. Pt will obtain an MMT of >4/5 in R knee flexion.  2. Pt will report of being able to perform a squat and lift an object from the ground with NPRS <2/10 to demonstrate proper mechanics for job duties.   3. Pt will report of being able to ambulate >1 hour with NPRS <2/10.  4. Pt will obtain a FOTO score of >75/100.      Plan  Patient would benefit from: skilled physical therapy    Planned therapy interventions: functional ROM exercises, home exercise program, therapeutic exercise, therapeutic activities, stretching, strengthening, patient/caregiver education, neuromuscular re-education,  "manual therapy and joint mobilization    Frequency: 2x week  Duration in weeks: 8  Plan of Care beginning date: 2024  Plan of Care expiration date: 1/15/2025  Treatment plan discussed with: patient        Subjective Evaluation    History of Present Illness  Mechanism of injury: In July, she was on a boat and slid and fell and noticed increase leg pain with walking. Then in , she walked even more and noticed even more pain in her R knee. The doctors thought it was OA, received an MRI, showed that there was a meniscus tear, and is now post-op for R knee arthroscopy. She is taking medications, tylenol, and notices that it helps reduce her symptoms. She denies any numbness or tingling into her R foot. She notices it is hard to navigate stairs as it is hard to WB onto her RLE when advancing her LLE. She also denies any recent fever or chills since surgery. She is currently works as a  as she has to bend, walking, and reaching. She notices that her R knee can stiffen when she sits for a while.   Patient Goals  Patient goal: \"No more pain\"  Pain  Current pain ratin  At best pain ratin  At worst pain ratin  Quality: throbbing and burning  Aggravating factors: walking and stair climbing          Objective     Neurological Testing     Sensation     Knee   Left Knee   Intact: Light touch    Right Knee   Intact: light touch     Reflexes   Left   Patellar (L4): normal (2+)  Achilles (S1): normal (2+)    Right   Patellar (L4): normal (2+)  Achilles (S1): normal (2+)  Babinski sign: negative    Active Range of Motion   Left Knee   Flexion: 130 degrees   Extension: 0 degrees     Right Knee   Flexion: 105 degrees   Extension: 0 degrees     Strength/Myotome Testing     Left Hip   Planes of Motion   Flexion: 4-  Extension: 4-  Abduction: 4    Right Hip   Planes of Motion   Flexion: 4+  Extension: 3+  Abduction: 4    Left Knee   Flexion: 5  Extension: 5    Right Knee   Flexion: 4-  Extension: " 3+             Precautions: HTN, hernia, CKD  POC expires Unit limit Auth Expiration date PT/OT/ST + Visit Limit?   1/15/25 3 required 60                           Visit/Unit Tracking  AUTH Status:  Date 11/20              required Used 1               Remaining                  [email or web address]  Access Code: [email or web address]    Manuals 11/20            Traction 4 mins            A-P glides 4 mins                                      Neuro Re-Ed                                                                                                        Ther Ex             SLR HEP            Heel slides HEP            Bridges HEP            Step ups             LAQ             STS                                       Ther Activity                                       Gait Training                                       Modalities

## 2024-11-20 ENCOUNTER — EVALUATION (OUTPATIENT)
Dept: PHYSICAL THERAPY | Facility: CLINIC | Age: 59
End: 2024-11-20
Payer: COMMERCIAL

## 2024-11-20 DIAGNOSIS — Z98.890 S/P RIGHT KNEE ARTHROSCOPY: Primary | ICD-10-CM

## 2024-11-20 PROCEDURE — 97140 MANUAL THERAPY 1/> REGIONS: CPT

## 2024-11-20 PROCEDURE — 97161 PT EVAL LOW COMPLEX 20 MIN: CPT

## 2024-11-20 PROCEDURE — 97110 THERAPEUTIC EXERCISES: CPT

## 2024-11-22 ENCOUNTER — OFFICE VISIT (OUTPATIENT)
Dept: OBGYN CLINIC | Facility: CLINIC | Age: 59
End: 2024-11-22

## 2024-11-22 VITALS
DIASTOLIC BLOOD PRESSURE: 87 MMHG | BODY MASS INDEX: 30.36 KG/M2 | HEART RATE: 78 BPM | SYSTOLIC BLOOD PRESSURE: 138 MMHG | HEIGHT: 62 IN | WEIGHT: 165 LBS

## 2024-11-22 DIAGNOSIS — Z98.890 S/P RIGHT KNEE ARTHROSCOPY: Primary | ICD-10-CM

## 2024-11-22 PROCEDURE — 99024 POSTOP FOLLOW-UP VISIT: CPT | Performed by: ORTHOPAEDIC SURGERY

## 2024-11-22 NOTE — PROGRESS NOTES
Patient Name:  Fartun Hurley  MRN:  [id number]    Assessment & Plan     1. S/P right knee arthroscopy    Approximately 10 day s/p Right knee arthroscopic partial medial menisectomy performed on 11/12/2024  Overall, patient doing well s/p surgical intervention  Sutures removed and reviewed intraoperative arthroscopic images  Encouraged patient to continue outpatient PT to work on strengthening and range of motion  Advised patient it is normal to have waxing and waning symptoms after increasing activity  Work note provided to return 12/10/2024 without restrictions  Follow up 4 weeks    History of the Present Illness   Fartun Hurley is a 59 y.o. female approximately 10 day s/p Right knee arthroscopic partial medial menisectomy performed on 11/12/2024. Today, patient reports she is feeling significantly improved since before surgery. She admits to anterior knee pain with ambulating down stairs. She denies residual medial knee pain. She has attended one PT session without significant difficulty. Patient works at [employer].             Review of Systems     Review of Systems   Constitutional:  Negative for chills and fever.   HENT:  Negative for congestion.    Respiratory:  Negative for cough, chest tightness and shortness of breath.    Cardiovascular:  Negative for chest pain and palpitations.   Gastrointestinal:  Negative for abdominal pain.   Endocrine: Negative for cold intolerance and heat intolerance.   Neurological:  Negative for syncope.   Psychiatric/Behavioral:  Negative for confusion.        Physical Exam     There were no vitals taken for this visit.    Right Knee  Surgical incisions well healed  Range of motion from 0 to 120 degrees without pain.    There is trace post operative effusion.    There is no tenderness over the knee.    The patient is able to perform a straight leg raise with 5/5 quad strength.     Calf soft, compressible and nontender  The patient is  neurovascular intact distally.      Data Review     I have personally reviewed pertinent films in PACS, and my interpretation follows.    No new images     Social History     Tobacco Use    Smoking status: Never     Passive exposure: Never    Smokeless tobacco: Never   Vaping Use    Vaping status: Never Used   Substance Use Topics    Alcohol use: Yes     Alcohol/week: 2.0 standard drinks of alcohol     Types: 2 Glasses of wine per week     Comment: socially    Drug use: No           Procedures  None     Samantha Baker PA-C

## 2024-11-22 NOTE — LETTER
November 22, 2024     Patient: Fartun Hurley  YOB: 1965  Date of Visit: 11/22/2024      To Whom it May Concern:    Fartun Hurley is under my professional care. Fartun was seen in my office on 11/22/2024. Fartun may not return to work at this time. In 2 weeks patient may return to work without restrictions (12/10/2024).    If you have any questions or concerns, please don't hesitate to call.         Sincerely,          Robin Zavala,         CC: No Recipients

## 2024-11-26 ENCOUNTER — APPOINTMENT (OUTPATIENT)
Dept: PHYSICAL THERAPY | Facility: CLINIC | Age: 59
End: 2024-11-26
Payer: COMMERCIAL

## 2024-12-02 ENCOUNTER — OFFICE VISIT (OUTPATIENT)
Age: 59
End: 2024-12-02
Payer: COMMERCIAL

## 2024-12-02 VITALS
HEART RATE: 97 BPM | BODY MASS INDEX: 30.91 KG/M2 | TEMPERATURE: 97.2 F | WEIGHT: 168 LBS | SYSTOLIC BLOOD PRESSURE: 120 MMHG | DIASTOLIC BLOOD PRESSURE: 76 MMHG | OXYGEN SATURATION: 98 % | HEIGHT: 62 IN

## 2024-12-02 DIAGNOSIS — I10 ESSENTIAL HYPERTENSION: ICD-10-CM

## 2024-12-02 DIAGNOSIS — J06.9 UPPER RESPIRATORY TRACT INFECTION, UNSPECIFIED TYPE: Primary | ICD-10-CM

## 2024-12-02 DIAGNOSIS — G47.9 SLEEP DISTURBANCE: ICD-10-CM

## 2024-12-02 PROCEDURE — 99214 OFFICE O/P EST MOD 30 MIN: CPT

## 2024-12-02 RX ORDER — ALBUTEROL SULFATE 90 UG/1
2 INHALANT RESPIRATORY (INHALATION) EVERY 6 HOURS PRN
Qty: 18 G | Refills: 5 | Status: SHIPPED | OUTPATIENT
Start: 2024-12-02

## 2024-12-02 RX ORDER — BENZONATATE 100 MG/1
100 CAPSULE ORAL 3 TIMES DAILY PRN
Qty: 20 CAPSULE | Refills: 0 | Status: SHIPPED | OUTPATIENT
Start: 2024-12-02

## 2024-12-02 RX ORDER — HYDROXYZINE HYDROCHLORIDE 25 MG/1
25 TABLET, FILM COATED ORAL AS NEEDED
Qty: 30 TABLET | Refills: 1 | Status: SHIPPED | OUTPATIENT
Start: 2024-12-02 | End: 2025-01-01

## 2024-12-02 RX ORDER — GUAIFENESIN 600 MG/1
1200 TABLET, EXTENDED RELEASE ORAL EVERY 12 HOURS SCHEDULED
Qty: 40 TABLET | Refills: 0 | Status: SHIPPED | OUTPATIENT
Start: 2024-12-02 | End: 2024-12-12

## 2024-12-02 NOTE — PROGRESS NOTES
Name: Fartun RodriguezMcnab      : 1965      MRN: [id number]  Encounter Provider: Jamie Calles PA-C  Encounter Date: 2024   Encounter department: [facility] [place]  :  Assessment & Plan  Upper respiratory tract infection, unspecified type  Patient exposed to RSV by astrid last week who tested positive.  Patient has not done any testing at this point.  Reassured and recommended against ordering a viral panel at this time as treatment plan would not change.  Patient understands this recommendation.    -Continue supportive treatment.  Take Mucinex as prescribed, benzonatate as needed for cough and albuterol as needed for wheezing/short of breath.  Increase Tylenol to  650 mg every 6 hours around-the-clock.  Continue rest and hydration.  -Follow up for any new or worsening symptoms or if persistent  Orders:    guaiFENesin (MUCINEX) 600 mg 12 hr tablet; Take 2 tablets (1,200 mg total) by mouth every 12 (twelve) hours for 10 days    benzonatate (TESSALON PERLES) 100 mg capsule; Take 1 capsule (100 mg total) by mouth 3 (three) times a day as needed for cough    albuterol (Ventolin HFA) 90 mcg/act inhaler; Inhale 2 puffs every 6 (six) hours as needed for wheezing    Sleep disturbance  Works different shifts at work at [employer] and has dificult time falling asleep at times  Reviewed medication options vs otc and lifestyle   Patient interested in hydroxyzine   Orders:    hydrOXYzine HCL (ATARAX) 25 mg tablet; Take 1 tablet (25 mg total) by mouth if needed (sleep)    Essential hypertension  Controlled, 120/76 today. Continue current medications and follow up plan               History of Present Illness     Patient presents today for upper respiratory symptoms.  [place] tested positive for COVID last week.  Symptoms began Friday and are  fatigue, chills, dry cough, congestion, eye pain, body aches that started gradually and are gradually worsening.  Has been taking Tylenol here  "and there at home with minimal relief however inconsistently.  No other medications tried.  Interested in getting tested for RSV. Chills currently resolved   -no chest pain, sob, leg swelling, ear pain    For the past 2-3 months has had periodic times of difficulty falling asleep due to different shift at work, [employer]. Usually working a night shift. Hasn't tried anything at home for sleep    Cough  Associated symptoms include chills. Pertinent negatives include no chest pain, ear pain, eye redness, fever, headaches, rhinorrhea, shortness of breath or wheezing.     Review of Systems   Constitutional:  Positive for chills and fatigue. Negative for activity change, appetite change, diaphoresis, fever and unexpected weight change.   HENT:  Positive for congestion. Negative for ear discharge, ear pain, rhinorrhea, sinus pressure, sinus pain and sneezing.    Eyes:  Positive for pain. Negative for photophobia, discharge, redness, itching and visual disturbance.   Respiratory:  Positive for cough. Negative for chest tightness, shortness of breath and wheezing.    Cardiovascular:  Negative for chest pain, palpitations and leg swelling.   Gastrointestinal: Negative.    Endocrine: Negative.  Negative for polydipsia, polyphagia and polyuria.   Genitourinary: Negative.  Negative for dysuria, flank pain, frequency, hematuria and urgency.   Musculoskeletal: Negative.  Negative for back pain, joint swelling, neck pain and neck stiffness.   Skin: Negative.    Neurological: Negative.  Negative for dizziness, weakness, light-headedness and headaches.   Hematological:  Negative for adenopathy.   Psychiatric/Behavioral: Negative.  Negative for confusion and sleep disturbance. The patient is not nervous/anxious.           Objective   /76 (Patient Position: Sitting, Cuff Size: Standard)   Pulse 97   Temp (!) 97.2 °F (36.2 °C)   Ht 5' 2\" (1.575 m)   Wt 76.2 kg (168 lb)   SpO2 98%   BMI 30.73 kg/m²      Physical Exam  Vitals and " "nursing note reviewed.   Constitutional:       General: She is not in acute distress.     Appearance: She is normal weight. She is not ill-appearing, toxic-appearing or diaphoretic.   HENT:      Head: Normocephalic and atraumatic.      Right Ear: External ear normal.      Left Ear: External ear normal.      Nose: Congestion present.      Mouth/Throat:      Mouth: Mucous membranes are moist.      Pharynx: Oropharynx is clear. No oropharyngeal exudate or posterior oropharyngeal erythema.   Eyes:      General: No scleral icterus.        Right eye: No discharge.         Left eye: No discharge.      Extraocular Movements: Extraocular movements intact.      Conjunctiva/sclera: Conjunctivae normal.   Cardiovascular:      Rate and Rhythm: Normal rate and regular rhythm.   Pulmonary:      Effort: Pulmonary effort is normal. No respiratory distress.      Breath sounds: Normal breath sounds. No wheezing or rales.   Musculoskeletal:      Cervical back: Normal range of motion and neck supple.      Right lower leg: No edema.      Left lower leg: No edema.   Skin:     Findings: No rash.   Neurological:      Mental Status: She is alert. Mental status is at baseline.   Psychiatric:         Mood and Affect: Mood normal.         Behavior: Behavior normal.         Thought Content: Thought content normal.         Judgment: Judgment normal.       Portions of the record may have been created with voice recognition software. Occasional wrong word or \"sound a like\" substitutions may have occurred due to the inherent limitations of voice recognition software. Read the chart carefully and recognize, using context, where substitutions have occurred.    "

## 2024-12-05 ENCOUNTER — OFFICE VISIT (OUTPATIENT)
Dept: PHYSICAL THERAPY | Facility: CLINIC | Age: 59
End: 2024-12-05
Payer: COMMERCIAL

## 2024-12-05 DIAGNOSIS — Z98.890 S/P RIGHT KNEE ARTHROSCOPY: Primary | ICD-10-CM

## 2024-12-05 PROCEDURE — 97110 THERAPEUTIC EXERCISES: CPT

## 2024-12-05 NOTE — PROGRESS NOTES
"Daily Note     Today's date: 2024  Patient name: Fartun Hurley  : 1965  MRN: [id number]  Referring provider: Samantha Baker PA-C  Dx:   Encounter Diagnosis     ICD-10-CM    1. S/P right knee arthroscopy  Z98.890           Start Time: 1545  Stop Time: 1627  Total time in clinic (min): 42 minutes    Subjective: She reports that her knee has been good, she says she has not been doing the exercises, but has had little pain on the top of her R knee when she bends it too much.       Objective: See treatment diary below      Assessment: Tolerated treatment well. With manuals, pt was able to achieve a step down from an 8\" step with NPRS <1/10. She needed VC for exercise set-up. Discussed future POC and she was in agreements with attending future sessions to focus on R quad strengthening. With wall squats, she had increased pain in the R knee and exercise was discontinued. Patient demonstrated fatigue post treatment and would benefit from continued PT.      Plan: Continue per plan of care.      Precautions: HTN, hernia, CKD  POC expires Unit limit Auth Expiration date PT/OT/ST + Visit Limit?   1/15/25 3 required 60                           Visit/Unit Tracking  AUTH Status:  Date              12 visits Used 1 2              Remaining  11 10               [email or web address]  Access Code: [email or web address]    Manuals            Traction 4 mins            A-P glides 4 mins 4 mins                                     Neuro Re-Ed             Eccentric step downs  X10                                                                                          Ther Ex             SLR HEP 3x10            Heel slides HEP            Bridges HEP 2x10 5sh           Step ups             LAQ  3x10 3# AW 5 second eccentric lowering            STS  2x10 5 sec eccentric            Leg Press             Wall ball squat   X10 discontinued           Ther Activity                                     "   Gait Training                                       Modalities

## 2024-12-08 NOTE — PROGRESS NOTES
Daily Note     Today's date: 2024  Patient name: Fartun Hurley  : 1965  MRN: [id number]  Referring provider: Samantha Baker PA-C  Dx:   Encounter Diagnosis     ICD-10-CM    1. S/P right knee arthroscopy  Z98.890                      Subjective: ***      Objective: See treatment diary below      Assessment: Tolerated treatment {Tolerated treatment :0485319050}. Patient {assessment:3788678402}      Plan: {PLAN:1476568323}     Precautions: HTN, hernia, CKD  POC expires Unit limit Auth Expiration date PT/OT/ST + Visit Limit?   1/15/25 3 required 60                           Visit/Unit Tracking  AUTH Status:  Date             12 visits Used 1 2 3             Remaining  11 10 9              [email or web address]  Access Code: [email or web address]    Manuals           Traction 4 mins            A-P glides 4 mins 4 mins                                     Neuro Re-Ed             Eccentric step downs  X10                                                                                          Ther Ex             SLR HEP 3x10            Heel slides HEP            Bridges HEP 2x10 5sh           Step ups             LAQ  3x10 3# AW 5 second eccentric lowering            STS  2x10 5 sec eccentric            Leg Press             Wall ball squat   X10 discontinued           Ther Activity                                       Gait Training                                       Modalities

## 2024-12-09 ENCOUNTER — APPOINTMENT (OUTPATIENT)
Dept: PHYSICAL THERAPY | Facility: CLINIC | Age: 59
End: 2024-12-09
Payer: COMMERCIAL

## 2024-12-11 ENCOUNTER — OFFICE VISIT (OUTPATIENT)
Dept: PHYSICAL THERAPY | Facility: CLINIC | Age: 59
End: 2024-12-11
Payer: COMMERCIAL

## 2024-12-11 DIAGNOSIS — Z98.890 S/P RIGHT KNEE ARTHROSCOPY: Primary | ICD-10-CM

## 2024-12-11 PROCEDURE — 97110 THERAPEUTIC EXERCISES: CPT

## 2024-12-11 NOTE — PROGRESS NOTES
Daily Note     Today's date: 2024  Patient name: Fartun Hurley  : 1965  MRN: [id number]  Referring provider: Samantha Baker PA-C  Dx:   Encounter Diagnosis     ICD-10-CM    1. S/P right knee arthroscopy  Z98.890           Start Time: 1415  Stop Time: 1458  Total time in clinic (min): 43 minutes    Subjective: She reports that she got denied from work clearance as she could not complete a full squat. After last session, she had complaints of increased soreness in R knee that lasted for a day, but today she just feels sore.       Objective: See treatment diary below      Assessment: Tolerated treatment well. She was educated about DOMS and using ice to help with symptoms. She was able to tolerate ball wall squats better this session, but on the second set she started to have symptoms and it was discontinued. Patient would benefit from continued PT.      Plan: Continue per plan of care.      Precautions: HTN, hernia, CKD  POC expires Unit limit Auth Expiration date PT/OT/ST + Visit Limit?   1/15/25 3 required 60                           Visit/Unit Tracking  AUTH Status:  Date             12 visits Used 1 2 3             Remaining  11 10 9              [email or web address]  Access Code: [email or web address]    Manuals           Traction 4 mins            A-P glides 4 mins 4 mins                                     Neuro Re-Ed             Eccentric step downs  X10                                                                                          Ther Ex             SLR HEP 3x10  3x10           Heel slides HEP            Bridges HEP 2x10 5sh 2x10 5sh          Step ups             LAQ  3x10 3# AW 5 second eccentric lowering  3x10 4# AW eccentric focus           STS  2x10 5 sec eccentric  2x10 5 sec eccentric          Leg Press             Wall ball squat   X10 discontinued 2x10           Bike   6 mins          Deadlifts    NV          RDLs   NV          Ther  Activity                                       Gait Training                                       Modalities

## 2024-12-13 ENCOUNTER — TELEPHONE (OUTPATIENT)
Dept: HEMATOLOGY ONCOLOGY | Facility: CLINIC | Age: 59
End: 2024-12-13

## 2024-12-15 NOTE — PROGRESS NOTES
Daily Note     Today's date: 12/15/2024  Patient name: Fartun Hurley  : 1965  MRN: [id number]  Referring provider: Samantha Baker PA-C  Dx:   Encounter Diagnosis     ICD-10-CM    1. S/P right knee arthroscopy  Z98.890                      Subjective: ***      Objective: See treatment diary below      Assessment: Tolerated treatment {Tolerated treatment :3662716847}. Patient {assessment:5614909870}      Plan: {PLAN:6486883281}     Precautions: HTN, hernia, CKD  POC expires Unit limit Auth Expiration date PT/OT/ST + Visit Limit?   1/15/25 3 required 60                           Visit/Unit Tracking  AUTH Status:  Date            12 visits Used 1 2 3 4            Remaining  11 10 9 8             [email or web address]  Access Code: [email or web address]    Manuals          Traction 4 mins            A-P glides 4 mins 4 mins                                     Neuro Re-Ed             Eccentric step downs  X10                                                                                          Ther Ex             SLR HEP 3x10  3x10           Heel slides HEP            Bridges HEP 2x10 5sh 2x10 5sh          Step ups             LAQ  3x10 3# AW 5 second eccentric lowering  3x10 4# AW eccentric focus           STS  2x10 5 sec eccentric  2x10 5 sec eccentric          Leg Press             Wall ball squat   X10 discontinued 2x10           Bike   6 mins          Deadlifts    NV          RDLs   NV          Ther Activity                                       Gait Training                                       Modalities

## 2024-12-16 ENCOUNTER — APPOINTMENT (OUTPATIENT)
Dept: PHYSICAL THERAPY | Facility: CLINIC | Age: 59
End: 2024-12-16
Payer: COMMERCIAL

## 2024-12-16 ENCOUNTER — APPOINTMENT (OUTPATIENT)
Age: 59
End: 2024-12-16
Payer: COMMERCIAL

## 2024-12-16 DIAGNOSIS — Z98.890 S/P RIGHT KNEE ARTHROSCOPY: Primary | ICD-10-CM

## 2024-12-16 DIAGNOSIS — D70.9 NEUTROPENIA, UNSPECIFIED TYPE (HCC): ICD-10-CM

## 2024-12-16 DIAGNOSIS — D72.820 LYMPHOCYTOSIS: ICD-10-CM

## 2024-12-16 PROCEDURE — 85007 BL SMEAR W/DIFF WBC COUNT: CPT

## 2024-12-16 PROCEDURE — 36415 COLL VENOUS BLD VENIPUNCTURE: CPT

## 2024-12-16 PROCEDURE — 88185 FLOWCYTOMETRY/TC ADD-ON: CPT | Performed by: PHYSICIAN ASSISTANT

## 2024-12-16 PROCEDURE — 88184 FLOWCYTOMETRY/ TC 1 MARKER: CPT

## 2024-12-17 ENCOUNTER — TELEPHONE (OUTPATIENT)
Dept: NEPHROLOGY | Facility: CLINIC | Age: 59
End: 2024-12-17

## 2024-12-17 LAB
EOSINOPHIL # BLD AUTO: 0.05 THOUSAND/UL (ref 0–0.61)
EOSINOPHIL NFR BLD MANUAL: 1 % (ref 0–6)
ERYTHROCYTE [DISTWIDTH] IN BLOOD BY AUTOMATED COUNT: 13.2 % (ref 11.6–15.1)
HCT VFR BLD AUTO: 38.7 % (ref 34.8–46.1)
HGB BLD-MCNC: 12.3 G/DL (ref 11.5–15.4)
LYMPHOCYTES # BLD AUTO: 3.16 THOUSAND/UL (ref 0.6–4.47)
LYMPHOCYTES # BLD AUTO: 56 %
MCH RBC QN AUTO: 26.5 PG (ref 26.8–34.3)
MCHC RBC AUTO-ENTMCNC: 31.8 G/DL (ref 31.4–37.4)
MCV RBC AUTO: 83 FL (ref 82–98)
MONOCYTES # BLD AUTO: 0.3 THOUSAND/UL (ref 0–1.22)
MONOCYTES NFR BLD AUTO: 6 % (ref 4–12)
NEUTS SEG # BLD: 1.43 THOUSAND/UL (ref 1.81–6.82)
NEUTS SEG NFR BLD AUTO: 29 %
NRBC BLD AUTO-RTO: 0 /100 WBCS
PLATELET # BLD AUTO: 374 THOUSANDS/UL (ref 149–390)
PMV BLD AUTO: 11.2 FL (ref 8.9–12.7)
RBC # BLD AUTO: 4.64 MILLION/UL (ref 3.81–5.12)
TOTAL CELLS COUNTED SPEC: 100
VARIANT LYMPHS # BLD AUTO: 8 % (ref 0–0)
WBC # BLD AUTO: 4.93 THOUSAND/UL (ref 4.31–10.16)

## 2024-12-17 NOTE — TELEPHONE ENCOUNTER
Called left voice message to remind patient to get labs done 1 week prior to 01/06/25 scheduled follow-up appointment with .   advised as a reminder labs / testing will need to be done in the appropriate time for scheduled appointment.

## 2024-12-18 ENCOUNTER — OFFICE VISIT (OUTPATIENT)
Dept: PHYSICAL THERAPY | Facility: CLINIC | Age: 59
End: 2024-12-18
Payer: COMMERCIAL

## 2024-12-18 DIAGNOSIS — Z98.890 S/P RIGHT KNEE ARTHROSCOPY: Primary | ICD-10-CM

## 2024-12-18 PROCEDURE — 97110 THERAPEUTIC EXERCISES: CPT

## 2024-12-18 NOTE — PROGRESS NOTES
Daily Note     Today's date: 2024  Patient name: Fartun Hurley  : 1965  MRN: [id number]  Referring provider: Samantha Baker PA-C  Dx:   Encounter Diagnosis     ICD-10-CM    1. S/P right knee arthroscopy  Z98.890           Start Time: 1012  Stop Time: 1057  Total time in clinic (min): 45 minutes    Subjective: She reports that she felt fine after last session, but overall is feeling better. She notes that at the end of most days is when she starts to feel increased pain in the knee.       Objective: See treatment diary below      Assessment: Tolerated treatment well. Eccentric control was focus for step downs and there was lack of control observed in R quad. She was introduced to leg press and dead lifts and responded well to exercises as she had reported a 5/10 in fatigue after completion. Patient would benefit from continued PT.      Plan: Continue per plan of care.      Precautions: HTN, hernia, CKD  POC expires Unit limit Auth Expiration date PT/OT/ST + Visit Limit?   1/15/25 3 required 60                           Visit/Unit Tracking  AUTH Status:  Date            12 visits Used 1 2 3 4            Remaining  11 10 9 8             [email or web address]  Access Code: [email or web address]    Manuals          Traction 4 mins            A-P glides 4 mins 4 mins                                     Neuro Re-Ed             Eccentric step downs  X10                                                                                          Ther Ex             SLR HEP 3x10  3x10  2x10 2# AW         Heel slides HEP            Bridges HEP 2x10 5sh 2x10 5sh 2x10 5sh         Step ups             LAQ  3x10 3# AW 5 second eccentric lowering  3x10 4# AW eccentric focus  3x10 4# AW eccentric focus          STS  2x10 5 sec eccentric  2x10 5 sec eccentric          Leg Press    95# x10; 115# 2x10          Wall ball squat   X10 discontinued 2x10  2x10          Bike    6 mins 6 mins         Nathaniel    NV 4# db 2x10          RDLs   NV          Ther Activity                                       Gait Training                                       Modalities

## 2024-12-19 ENCOUNTER — OFFICE VISIT (OUTPATIENT)
Dept: HEMATOLOGY ONCOLOGY | Facility: CLINIC | Age: 59
End: 2024-12-19
Payer: COMMERCIAL

## 2024-12-19 ENCOUNTER — OFFICE VISIT (OUTPATIENT)
Dept: PHYSICAL THERAPY | Facility: CLINIC | Age: 59
End: 2024-12-19
Payer: COMMERCIAL

## 2024-12-19 VITALS
WEIGHT: 168 LBS | DIASTOLIC BLOOD PRESSURE: 90 MMHG | SYSTOLIC BLOOD PRESSURE: 128 MMHG | BODY MASS INDEX: 30.91 KG/M2 | OXYGEN SATURATION: 98 % | HEART RATE: 82 BPM | HEIGHT: 62 IN | TEMPERATURE: 97.2 F

## 2024-12-19 DIAGNOSIS — D72.820 ATYPICAL LYMPHOCYTOSIS: ICD-10-CM

## 2024-12-19 DIAGNOSIS — Z98.890 S/P RIGHT KNEE ARTHROSCOPY: Primary | ICD-10-CM

## 2024-12-19 DIAGNOSIS — D70.9 NEUTROPENIA, UNSPECIFIED TYPE (HCC): Primary | ICD-10-CM

## 2024-12-19 PROCEDURE — 99213 OFFICE O/P EST LOW 20 MIN: CPT | Performed by: PHYSICIAN ASSISTANT

## 2024-12-19 PROCEDURE — 97110 THERAPEUTIC EXERCISES: CPT

## 2024-12-19 NOTE — PROGRESS NOTES
Daily Note     Today's date: 2024  Patient name: Fartun Hurley  : 1965  MRN: [id number]  Referring provider: Samantha Baker PA-C  Dx:   Encounter Diagnosis     ICD-10-CM    1. S/P right knee arthroscopy  Z98.890           Start Time: 1456  Stop Time: 1535  Total time in clinic (min): 39 minutes    Subjective: Reports of some soreness in BLE after previous session yesterday.       Objective: See treatment diary below      Assessment: Tolerated treatment well. Manuals and quad stretch were effective for reducing her soreness in her RLE. Sets were reduced this session as she had overall soreness this session. Patient would benefit from continued PT.      Plan: Continue per plan of care.      Precautions: HTN, hernia, CKD  POC expires Unit limit Auth Expiration date PT/OT/ST + Visit Limit?   1/15/25 3 required 60                           Visit/Unit Tracking  AUTH Status:  Date           12 visits Used 1 2 3 4 5           Remaining  11 10 9 8 7            [email or web address]  Access Code: [email or web address]    Manuals         Traction 4 mins    4 mins        A-P glides 4 mins 4 mins   4 mins                                  Neuro Re-Ed             Eccentric step downs  X10                                                                                          Ther Ex             SLR HEP 3x10  3x10  2x10 2# AW 2x10 2# AW        Heel slides HEP            Bridges HEP 2x10 5sh 2x10 5sh 2x10 5sh 2x15 5sh        Step ups             LAQ  3x10 3# AW 5 second eccentric lowering  3x10 4# AW eccentric focus  3x10 4# AW eccentric focus          STS  2x10 5 sec eccentric  2x10 5 sec eccentric          Leg Press    95# x10; 115# 2x10          Wall ball squat   X10 discontinued 2x10  2x10  2x10        Bike   6 mins 6 mins 6 mins        Deadlifts    NV 4# db 2x10  4# db 2x10         RDLs   NV          Supine quad stretch     3x1'        Ther  Activity                                       Gait Training                                       Modalities

## 2024-12-19 NOTE — PROGRESS NOTES
[facility]ATOLOGY ONCOLOGY SPECIALISTS [address] [address]  Hematology Ambulatory Follow-Up  Fartun Hurley, 1965, [id number]  12/19/2024      Assessment and Plan     59-year-old female who is seen in follow-up today for chronic neutropenia and relative lymphocytosis which has been present since at least 2018.  Patient has never had any complications related to her neutropenia. Her initial workup to explore viral, autoimmune and nutritional disorders showed past exposure to EBV/CMV without acute infection otherwise was unremarkable. Her initial peripheral flow cytometry showed T cells with minor immunophenotypic alterations (4%) however gene arrangement studies were negative favoring reactive process.  We repeated her flow cytometry 07/2024 in 09/2024 which showed minimal but persistent CD3 4 positive myeloblasts with most recent flow showing minimally increased CD34 +myeloblast 0.4% total. BCRABL FISH neg. Bone marrow biopsy was recommended and declined by patient.     Most recent labs -> WBC 4.93, hemoglobin 12.3, platelets 374,000.  Peripheral smear shows a percent atypical leukocytes, ANC 1.43.     Discussion/Plan   She has minimal, although persistent, present of CD34+ myeloblast. Patient was educated that myeloblast can be seen both in reactive and neoplastic myeloid conditions.  Bone marrow biopsy is recommended to rule out MPN and early leukemia.  Patient declined and opted for continued observation of blood counts.   Her recent CBC shows persistent atypical lymphocytosis and mild neutropenia without any cytopenias.  Repeat peripheral flow cytometry is in process.  Continue observation w CBC q3m pending her flow cytometry results.   RTC 4m   She was advised to call our office right away if she develops any unexplained fevers, significant fatigue, night sweats, lymphadenopathy, shortness of breath, lightheadedness, dizziness,  lymphadenopathy or frequent infections.      Patient voiced agreement and understanding to the above.   Patient advised to call the Hematology/Oncology office with any questions and concerns regarding the above.    Barrier(s) to care: None  The patient is able to self care.    Yue Mccormack PA-C   Medical Oncology/Hematology  [facility]    Subjective   No chief complaint on file.      History of present illness: 59-year-old female with past medical history of HTN, type 2 diabetes, CKD stage III, HLD who has been referred by her PCP for evaluation of lymphocytosis.     On chart review, patient has had a relative lymphocytosis, leukopenia and nuetropenia since at least 2018 with lymphocytes averaging around 49-63%.  Her highest absolute lymphocyte count was 2.8.  Her most recent CBC from 02/2024 showed mild leukopenia with WBC 3.92.  White blood cell differential showed neutropenia with ANC 0.82, relative lymphocytosis with lymphocyte 63%, ALC 2.53, and monocytosis 14%.  Her hemoglobin and platelet count were normal.     Patient endorses some mild weight loss (unable to quantify) a couple months ago however has regained that weight back.  She denies unexplained fevers, drenching night sweats, significant fatigue or lymphadenopathy.  No history of chronic infections such as HIV, hepatitis or any known autoimmune disease. She had HIV testing in 2020, reports monogamous relationship. She has no personal history of malignancy.  Does not have malabsorption disorder or history of bariatric surgery.  No tick bites.  Patient admits to social alcohol use.  Denies tobacco or drug use. Works as a  for [employer].  Great maternal aunt has history of breast cancer, no other family history of cancer.  She is up-to-date on her mammogram.  Last colonoscopy in 2014 was reportedly normal.  Cologuard normal in 2019. She travels out of the country often     She was recently diagnosed  "with CKD stage III which is possibly due to her hypertension/diabetes.       05/2024:   WBC 3.68, hemoglobin 13.0, platelets 376,000. Peripheral smear shows ANC 0.85  - Leukemia/lymphoma flow cytometry shows T-cell with minor immuno phenotypic alterations including a small subset  (4%) of TRC TdT positive T cells with no expression of both CD4 and CD8. Probable benign/reactive process. no abnormal B-cell population identified.   -Abdominal US normal spleen and liver.  -EBV IgG positive, EBC Ab+. Igm neg. CMV IgG positive. IgM neg. Chronic hepatitis panel negative. Lyme neg. HIV neg 2020. -No high risk behavior.  -BCRABL neg   -B12 643, MMA, copper, vitam D all normal   -CRP <1.0      07/09/2024: peripheral flow cytometry: relatively decreased neutrophils with rare CD3 4 positive blasts  T cell gamma and beta gene rearrangement neg      09/2024: peripheral flow cytometry: Minimally increased CD3 4 positive myeloblast (0.4% of total). WBC 4.75, hemoglobin 13.3, platelet 378,000.  Peripheral smear showed 2% basophils, 9% atypical lymphocytes, 1% metamyelocytes, myelocytes, ANC 1.09.  No peripheral blasts were identified.  Patient was on steroids 2 weeks prior to blood draw.  - She is having chronic right knee pain  - MRI showed tear of medial meniscus   - s/p Right knee arthroscopic partial medial menisectomy 11/12 12/19/24 :  No results found for: \"IRON\", \"TIBC\", \"FERRITIN\"  Lab Results   Component Value Date    WBC 4.93 12/16/2024    HGB 12.3 12/16/2024    HCT 38.7 12/16/2024    MCV 83 12/16/2024     12/16/2024       Interval history: s/p Right knee arthroscopic partial medial menisectomy 11/12.  Pain is slowly improving.  No fever, night sweats, unintentional weight loss, enlarged lymph nodes, significant fatigue, or new lumps/bumps/enlarged lymph nodes    Review of Systems   All other systems reviewed and are negative.      Patient Active Problem List   Diagnosis    Essential hypertension    Mild mitral " regurgitation by prior echocardiography    Chronic kidney disease, stage II (mild)    Vitamin D insufficiency    Asymptomatic postmenopausal status    Mixed hyperlipidemia    Hiatal hernia    Flank pain    Prediabetes    Abnormal CBC    S/P right knee arthroscopy     Past Medical History:   Diagnosis Date    Chronic kidney disease     Delayed emergence from anesthesia     High blood pressure     High cholesterol     Hypertension 14yr ago    Pre-diabetes      Past Surgical History:   Procedure Laterality Date    BREAST CYST EXCISION Left 01/01/1980    neg    COLONOSCOPY      KNEE SURGERY      KS ARTHRS KNEE W/MENISCECTOMY MED&LAT W/SHAVING Right 11/12/2024    Procedure: MENISCECTOMY MEDIAL- Right knee arthroscopic partial medial menisectomy;  Surgeon: Robin Zavala DO;  Location: [facility];  Service: Orthopedics    PTERYGIUM EXCISION Bilateral     eyes     Family History   Problem Relation Age of Onset    Diabetes Mother     Heart disease Mother     Alzheimer's disease Father     Hypertension Father     No Known Problems Sister     No Known Problems Sister     No Known Problems Sister     No Known Problems Sister     No Known Problems Daughter     No Known Problems Daughter     No Known Problems Maternal Grandmother     No Known Problems Paternal Grandmother     No Known Problems Maternal Aunt     No Known Problems Maternal Aunt     No Known Problems Paternal Aunt     Breast cancer Other 66    Breast cancer Other 66    Colon cancer Neg Hx     Ovarian cancer Neg Hx     Uterine cancer Neg Hx     Cervical cancer Neg Hx      Social History     Socioeconomic History    Marital status: /Civil Union     Spouse name: Not on file    Number of children: Not on file    Years of education: Not on file    Highest education level: Not on file   Occupational History    Not on file   Tobacco Use    Smoking status: Never     Passive exposure: Never    Smokeless tobacco: Never   Vaping Use    Vaping status: Never Used    Substance and Sexual Activity    Alcohol use: Yes     Alcohol/week: 2.0 standard drinks of alcohol     Types: 2 Glasses of wine per week     Comment: socially    Drug use: No    Sexual activity: Yes     Partners: Male     Birth control/protection: Male Sterilization   Other Topics Concern    Not on file   Social History Narrative    Not on file     Social Drivers of Health     Financial Resource Strain: Not on file   Food Insecurity: Not on file   Transportation Needs: Not on file   Physical Activity: Inactive (5/28/2024)    Exercise Vital Sign     Days of Exercise per Week: 0 days     Minutes of Exercise per Session: 0 min   Stress: No Stress Concern Present (3/18/2022)    [employer] - Occupational Stress Questionnaire     Feeling of Stress : Not at all   Social Connections: Not on file   Intimate Partner Violence: Not on file   Housing Stability: Not on file       Current Outpatient Medications:     albuterol (Ventolin HFA) 90 mcg/act inhaler, Inhale 2 puffs every 6 (six) hours as needed for wheezing, Disp: 18 g, Rfl: 5    benzonatate (TESSALON PERLES) 100 mg capsule, Take 1 capsule (100 mg total) by mouth 3 (three) times a day as needed for cough, Disp: 20 capsule, Rfl: 0    D3-50 1.25 MG (60537 UT) capsule, TAKE 1 CAPSULE (50,000 UNITS TOTAL) BY MOUTH ONCE A WEEK FOR 24 DOSES, Disp: 12 capsule, Rfl: 1    hydrOXYzine HCL (ATARAX) 25 mg tablet, Take 1 tablet (25 mg total) by mouth if needed (sleep), Disp: 30 tablet, Rfl: 1    multivitamin (THERAGRAN) TABS, Take 1 tablet by mouth daily, Disp: , Rfl:     NIFEdipine ER (ADALAT CC) 60 MG 24 hr tablet, TAKE 1 TABLET BY MOUTH EVERY DAY, Disp: 90 tablet, Rfl: 1    Omega-3 Fatty Acids (Fish Oil) 600 MG CAPS, Take by mouth, Disp: , Rfl:   No Known Allergies    Objective   There were no vitals taken for this visit.   Physical Exam  Vitals reviewed.   HENT:      Head: Normocephalic.   Cardiovascular:      Rate and Rhythm: Normal rate and  regular rhythm.   Pulmonary:      Effort: Pulmonary effort is normal.   Musculoskeletal:      Cervical back: Neck supple.   Lymphadenopathy:      Cervical: No cervical adenopathy.   Skin:     Findings: No rash.   Neurological:      Mental Status: She is alert.         Result Review  Labs:  Appointment on 12/16/2024   Component Date Value Ref Range Status    WBC 12/16/2024 4.93  4.31 - 10.16 Thousand/uL Final    RBC 12/16/2024 4.64  3.81 - 5.12 Million/uL Final    Hemoglobin 12/16/2024 12.3  11.5 - 15.4 g/dL Final    Hematocrit 12/16/2024 38.7  34.8 - 46.1 % Final    MCV 12/16/2024 83  82 - 98 fL Final    MCH 12/16/2024 26.5 (L)  26.8 - 34.3 pg Final    MCHC 12/16/2024 31.8  31.4 - 37.4 g/dL Final    RDW 12/16/2024 13.2  11.6 - 15.1 % Final    MPV 12/16/2024 11.2  8.9 - 12.7 fL Final    Platelets 12/16/2024 374  149 - 390 Thousands/uL Final    nRBC 12/16/2024 0  /100 WBCs Final    Total Counted 12/16/2024 100   Final    Segmented % 12/16/2024 29  % Final    Lymphocytes % 12/16/2024 56  % Final    Monocytes % 12/16/2024 6  4 - 12 % Final    Eosinophils % 12/16/2024 1  0 - 6 % Final    Atypical Lymphocytes % 12/16/2024 8 (H)  0 - 0 % Final    Absolute Neutrophils 12/16/2024 1.43 (L)  1.81 - 6.82 Thousand/uL Final    Absolute Lymphocytes 12/16/2024 3.16  0.60 - 4.47 Thousand/uL Final    Absolute Monocytes 12/16/2024 0.30  0.00 - 1.22 Thousand/uL Final    Absolute Eosinophils 12/16/2024 0.05  0.00 - 0.61 Thousand/uL Final       Imaging:   I reviewed relevant imaging    Please note:  This report has been generated by a voice recognition software system. Therefore there may be syntax, spelling, and/or grammatical errors. Please call if you have any questions.

## 2024-12-23 ENCOUNTER — RESULTS FOLLOW-UP (OUTPATIENT)
Dept: HEMATOLOGY ONCOLOGY | Facility: CLINIC | Age: 59
End: 2024-12-23

## 2024-12-23 LAB — SCAN RESULT: NORMAL

## 2024-12-24 DIAGNOSIS — G47.9 SLEEP DISTURBANCE: ICD-10-CM

## 2024-12-24 RX ORDER — HYDROXYZINE HYDROCHLORIDE 25 MG/1
25 TABLET, FILM COATED ORAL AS NEEDED
Qty: 90 TABLET | Refills: 1 | Status: SHIPPED | OUTPATIENT
Start: 2024-12-24 | End: 2025-01-23

## 2024-12-24 NOTE — PROGRESS NOTES
Daily Note     Today's date: 2024  Patient name: Fartun Hurley  : 1965  MRN: [id number]  Referring provider: Samantha Baker PA-C  Dx:   Encounter Diagnosis     ICD-10-CM    1. S/P right knee arthroscopy  Z98.890                      Subjective: ***      Objective: See treatment diary below      Assessment: Tolerated treatment {Tolerated treatment :0695614158}. Patient {assessment:4304720664}      Plan: {PLAN:0474557372}     Precautions: HTN, hernia, CKD  POC expires Unit limit Auth Expiration date PT/OT/ST + Visit Limit?   1/15/25 3 required 60                           Visit/Unit Tracking  AUTH Status:  Date          12 visits Used 1 2 3 4 5 6          Remaining  11 10 9 8 7 6           [email or web address]  Access Code: [email or web address]    Manuals        Traction 4 mins    4 mins        A-P glides 4 mins 4 mins   4 mins                                  Neuro Re-Ed             Eccentric step downs  X10                                                                                          Ther Ex             SLR HEP 3x10  3x10  2x10 2# AW 2x10 2# AW        Heel slides HEP            Bridges HEP 2x10 5sh 2x10 5sh 2x10 5sh 2x15 5sh        Step ups             LAQ  3x10 3# AW 5 second eccentric lowering  3x10 4# AW eccentric focus  3x10 4# AW eccentric focus          STS  2x10 5 sec eccentric  2x10 5 sec eccentric          Leg Press    95# x10; 115# 2x10          Wall ball squat   X10 discontinued 2x10  2x10  2x10        Bike   6 mins 6 mins 6 mins        Deadlifts    NV 4# db 2x10  4# db 2x10         RDLs   NV          Supine quad stretch     3x1'        Ther Activity                                       Gait Training                                       Modalities

## 2024-12-27 ENCOUNTER — APPOINTMENT (OUTPATIENT)
Dept: PHYSICAL THERAPY | Facility: CLINIC | Age: 59
End: 2024-12-27
Payer: COMMERCIAL

## 2024-12-27 DIAGNOSIS — Z98.890 S/P RIGHT KNEE ARTHROSCOPY: Primary | ICD-10-CM

## 2024-12-29 DIAGNOSIS — E55.9 VITAMIN D DEFICIENCY: ICD-10-CM

## 2024-12-30 ENCOUNTER — OFFICE VISIT (OUTPATIENT)
Dept: OBGYN CLINIC | Facility: CLINIC | Age: 59
End: 2024-12-30

## 2024-12-30 ENCOUNTER — APPOINTMENT (OUTPATIENT)
Age: 59
End: 2024-12-30
Payer: COMMERCIAL

## 2024-12-30 VITALS — BODY MASS INDEX: 30.36 KG/M2 | HEIGHT: 62 IN | WEIGHT: 165 LBS

## 2024-12-30 DIAGNOSIS — Z98.890 S/P RIGHT KNEE ARTHROSCOPY: Primary | ICD-10-CM

## 2024-12-30 DIAGNOSIS — N18.31 STAGE 3A CHRONIC KIDNEY DISEASE (CKD) (HCC): ICD-10-CM

## 2024-12-30 DIAGNOSIS — E11.22 TYPE 2 DIABETES MELLITUS WITH CHRONIC KIDNEY DISEASE, WITHOUT LONG-TERM CURRENT USE OF INSULIN, UNSPECIFIED CKD STAGE (HCC): ICD-10-CM

## 2024-12-30 DIAGNOSIS — E78.2 MIXED HYPERLIPIDEMIA: ICD-10-CM

## 2024-12-30 LAB
25(OH)D3 SERPL-MCNC: 93.8 NG/ML (ref 30–100)
ALBUMIN SERPL BCG-MCNC: 4.3 G/DL (ref 3.5–5)
ALP SERPL-CCNC: 65 U/L (ref 34–104)
ALT SERPL W P-5'-P-CCNC: 11 U/L (ref 7–52)
ANION GAP SERPL CALCULATED.3IONS-SCNC: 8 MMOL/L (ref 4–13)
AST SERPL W P-5'-P-CCNC: 13 U/L (ref 13–39)
BACTERIA UR QL AUTO: ABNORMAL /HPF
BASOPHILS # BLD AUTO: 0.03 THOUSANDS/ΜL (ref 0–0.1)
BASOPHILS NFR BLD AUTO: 1 % (ref 0–1)
BILIRUB SERPL-MCNC: 0.36 MG/DL (ref 0.2–1)
BILIRUB UR QL STRIP: NEGATIVE
BUN SERPL-MCNC: 14 MG/DL (ref 5–25)
CALCIUM SERPL-MCNC: 9.7 MG/DL (ref 8.4–10.2)
CHLORIDE SERPL-SCNC: 101 MMOL/L (ref 96–108)
CHOLEST SERPL-MCNC: 234 MG/DL (ref ?–200)
CLARITY UR: ABNORMAL
CO2 SERPL-SCNC: 30 MMOL/L (ref 21–32)
COLOR UR: YELLOW
CREAT SERPL-MCNC: 1.32 MG/DL (ref 0.6–1.3)
EOSINOPHIL # BLD AUTO: 0.06 THOUSAND/ΜL (ref 0–0.61)
EOSINOPHIL NFR BLD AUTO: 2 % (ref 0–6)
ERYTHROCYTE [DISTWIDTH] IN BLOOD BY AUTOMATED COUNT: 13.3 % (ref 11.6–15.1)
EST. AVERAGE GLUCOSE BLD GHB EST-MCNC: 151 MG/DL
GFR SERPL CREATININE-BSD FRML MDRD: 43 ML/MIN/1.73SQ M
GLUCOSE SERPL-MCNC: 207 MG/DL (ref 65–140)
GLUCOSE UR STRIP-MCNC: ABNORMAL MG/DL
HBA1C MFR BLD: 6.9 %
HCT VFR BLD AUTO: 40.7 % (ref 34.8–46.1)
HDLC SERPL-MCNC: 62 MG/DL
HGB BLD-MCNC: 12.7 G/DL (ref 11.5–15.4)
HGB UR QL STRIP.AUTO: NEGATIVE
HYALINE CASTS #/AREA URNS LPF: ABNORMAL /LPF
IMM GRANULOCYTES # BLD AUTO: 0.01 THOUSAND/UL (ref 0–0.2)
IMM GRANULOCYTES NFR BLD AUTO: 0 % (ref 0–2)
KETONES UR STRIP-MCNC: ABNORMAL MG/DL
LDLC SERPL CALC-MCNC: 151 MG/DL (ref 0–100)
LEUKOCYTE ESTERASE UR QL STRIP: ABNORMAL
LYMPHOCYTES # BLD AUTO: 2.21 THOUSANDS/ΜL (ref 0.6–4.47)
LYMPHOCYTES NFR BLD AUTO: 55 % (ref 14–44)
MCH RBC QN AUTO: 26.3 PG (ref 26.8–34.3)
MCHC RBC AUTO-ENTMCNC: 31.2 G/DL (ref 31.4–37.4)
MCV RBC AUTO: 84 FL (ref 82–98)
MONOCYTES # BLD AUTO: 0.31 THOUSAND/ΜL (ref 0.17–1.22)
MONOCYTES NFR BLD AUTO: 8 % (ref 4–12)
MUCOUS THREADS UR QL AUTO: ABNORMAL
NEUTROPHILS # BLD AUTO: 1.37 THOUSANDS/ΜL (ref 1.85–7.62)
NEUTS SEG NFR BLD AUTO: 34 % (ref 43–75)
NITRITE UR QL STRIP: NEGATIVE
NON-SQ EPI CELLS URNS QL MICRO: ABNORMAL /HPF
NRBC BLD AUTO-RTO: 0 /100 WBCS
PH UR STRIP.AUTO: 6 [PH]
PHOSPHATE SERPL-MCNC: 3.2 MG/DL (ref 2.3–4.1)
PLATELET # BLD AUTO: 337 THOUSANDS/UL (ref 149–390)
PMV BLD AUTO: 11.2 FL (ref 8.9–12.7)
POTASSIUM SERPL-SCNC: 3.6 MMOL/L (ref 3.5–5.3)
PROT SERPL-MCNC: 7.4 G/DL (ref 6.4–8.4)
PROT UR STRIP-MCNC: ABNORMAL MG/DL
PTH-INTACT SERPL-MCNC: 52.7 PG/ML (ref 12–88)
RBC # BLD AUTO: 4.82 MILLION/UL (ref 3.81–5.12)
RBC #/AREA URNS AUTO: ABNORMAL /HPF
SODIUM SERPL-SCNC: 139 MMOL/L (ref 135–147)
SP GR UR STRIP.AUTO: 1.03 (ref 1–1.03)
TRIGL SERPL-MCNC: 107 MG/DL (ref ?–150)
UROBILINOGEN UR STRIP-ACNC: 2 MG/DL
WBC # BLD AUTO: 3.99 THOUSAND/UL (ref 4.31–10.16)
WBC #/AREA URNS AUTO: ABNORMAL /HPF

## 2024-12-30 PROCEDURE — 36415 COLL VENOUS BLD VENIPUNCTURE: CPT

## 2024-12-30 PROCEDURE — 83970 ASSAY OF PARATHORMONE: CPT

## 2024-12-30 PROCEDURE — 82306 VITAMIN D 25 HYDROXY: CPT

## 2024-12-30 PROCEDURE — 85025 COMPLETE CBC W/AUTO DIFF WBC: CPT

## 2024-12-30 PROCEDURE — 81001 URINALYSIS AUTO W/SCOPE: CPT

## 2024-12-30 PROCEDURE — 99024 POSTOP FOLLOW-UP VISIT: CPT | Performed by: ORTHOPAEDIC SURGERY

## 2024-12-30 PROCEDURE — 80053 COMPREHEN METABOLIC PANEL: CPT

## 2024-12-30 PROCEDURE — 83036 HEMOGLOBIN GLYCOSYLATED A1C: CPT

## 2024-12-30 PROCEDURE — 80061 LIPID PANEL: CPT

## 2024-12-30 PROCEDURE — 84100 ASSAY OF PHOSPHORUS: CPT

## 2024-12-30 RX ORDER — CHOLECALCIFEROL (VITAMIN D3) 1250 MCG
CAPSULE ORAL
Qty: 12 CAPSULE | Refills: 1 | OUTPATIENT
Start: 2024-12-30

## 2024-12-30 NOTE — PROGRESS NOTES
"Patient Name:  Fartun Hurley  MRN:  [id number]    Assessment & Plan     1. S/P right knee arthroscopy    Approximately 7 week s/p Right knee arthroscopic partial medial menisectomy performed on 11/12/2024  Overall, patient doing well s/p surgical intervention  Advised patient to continue home exercises for strengthening  Continue OTC medication as needed for pain relief  Follow up 6 weeks for reevaluation     History of the Present Illness   Fartun Hurley is a 59 y.o. female approximately 7 week s/p Right knee arthroscopic partial medial menisectomy performed on 11/12/2024. Today, patient reports she is feeling a little sore. She admits she went back to work, but was unable to perform some duties and has been out since. Overall, she admits she is 100% improved since before surgery.             Review of Systems     Review of Systems   Constitutional:  Negative for chills and fever.   HENT:  Negative for congestion.    Respiratory:  Negative for cough, chest tightness and shortness of breath.    Cardiovascular:  Negative for chest pain and palpitations.   Gastrointestinal:  Negative for abdominal pain.   Endocrine: Negative for cold intolerance and heat intolerance.   Neurological:  Negative for syncope.   Psychiatric/Behavioral:  Negative for confusion.        Physical Exam     Ht 5' 2\" (1.575 m)   Wt 74.8 kg (165 lb)   BMI 30.18 kg/m²     Right Knee  Surgical incisions well healing without sign of infection  Range of motion from 0 to 120 degrees.    The patient is able to perform a straight leg raise with 4+/5 quad strength.     Calf soft, compressible and nontender   The patient is neurovascular intact distally.      Data Review     I have personally reviewed pertinent films in PACS, and my interpretation follows.    No new images     Social History     Tobacco Use    Smoking status: Never     Passive exposure: Never    Smokeless tobacco: Never   Vaping Use    Vaping status: Never Used "   Substance Use Topics    Alcohol use: Yes     Alcohol/week: 2.0 standard drinks of alcohol     Types: 2 Glasses of wine per week     Comment: socially    Drug use: No           Procedures  None     Samantha Baker PA-C

## 2024-12-30 NOTE — LETTER
January 10, 2025     Patient: Fartun Hurley  YOB: 1965  Date of Visit: 12/30/2024      To Whom it May Concern:    Fartun Hurley is under my professional care. Fartun was seen in my office on 12/30/2024. Fartun may return to work on 1/26/2025 with no restrictions.    If you have any questions or concerns, please don't hesitate to call.         Sincerely,          Robin Zavala,         CC: No Recipients

## 2024-12-30 NOTE — LETTER
January 8, 2025     Patient: Fartun Hurley  YOB: 1965  Date of Visit: 12/30/2024      To Whom it May Concern:    Fartun Hurley is under my professional care. Fartun was seen in my office on 12/30/2024. Fartun may return to work on 1/26/2025 .    If you have any questions or concerns, please don't hesitate to call.         Sincerely,          Robin Zavala,         CC: No Recipients

## 2024-12-31 ENCOUNTER — TELEPHONE (OUTPATIENT)
Dept: NEPHROLOGY | Facility: CLINIC | Age: 59
End: 2024-12-31

## 2024-12-31 ENCOUNTER — RESULTS FOLLOW-UP (OUTPATIENT)
Age: 59
End: 2024-12-31

## 2024-12-31 DIAGNOSIS — N39.0 FREQUENT UTI: Primary | ICD-10-CM

## 2024-12-31 RX ORDER — CEPHALEXIN 500 MG/1
500 CAPSULE ORAL EVERY 12 HOURS SCHEDULED
Qty: 10 CAPSULE | Refills: 0 | Status: SHIPPED | OUTPATIENT
Start: 2024-12-31 | End: 2025-01-05

## 2024-12-31 NOTE — TELEPHONE ENCOUNTER
Called and advised that pt has UTI and to drink at least 5-6 bottles of water daily and Dr Pimentel  prescribed antibiotic for her to be taken twice daily for 5. Pt understood and was ok with that.

## 2024-12-31 NOTE — TELEPHONE ENCOUNTER
----- Message from Steve Pimentel MD sent at 12/31/2024  1:41 PM EST -----  Hi  Please call patient and inform that she has UTI and that to drink at least 5-6 bottles of water daily and I have prescribed antibiotic for her to be taken twice daily for 5 days.  Dr Pimentel

## 2025-01-02 ENCOUNTER — OFFICE VISIT (OUTPATIENT)
Dept: PHYSICAL THERAPY | Facility: CLINIC | Age: 60
End: 2025-01-02
Payer: COMMERCIAL

## 2025-01-02 DIAGNOSIS — Z98.890 S/P RIGHT KNEE ARTHROSCOPY: Primary | ICD-10-CM

## 2025-01-02 PROCEDURE — 97110 THERAPEUTIC EXERCISES: CPT

## 2025-01-02 PROCEDURE — 97112 NEUROMUSCULAR REEDUCATION: CPT

## 2025-01-02 NOTE — PROGRESS NOTES
"Daily Note     Today's date: 2025  Patient name: Fartun Hurley  : 1965  MRN: [id number]  Referring provider: Samantha Baker PA-C  Dx:   Encounter Diagnosis     ICD-10-CM    1. S/P right knee arthroscopy  Z98.890           Start Time: 1015  Stop Time: 1057  Total time in clinic (min): 42 minutes    Subjective: She reports of her knee feeling better, but is still having pain with squatting.       Objective: See treatment diary below      Assessment: Tolerated treatment well. She was able to tolerate increased weight for leg press and STS with focus on squatting motion. She did have increased R knee pain with a deep squat with support that was relieved with rest. Patient would benefit from continued PT.      Plan: Continue per plan of care.      Precautions: HTN, hernia, CKD  POC expires Unit limit Auth Expiration date PT/OT/ST + Visit Limit?   1/15/25 3 required 60                           Visit/Unit Tracking  AUTH Status:  Date  1/2         12 visits Used 1 2 3 4 5 6          Remaining  11 10 9 8 7 6           [email or web address]  Access Code: [email or web address]    Manuals /2       Traction 4 mins    4 mins        A-P glides 4 mins 4 mins   4 mins                                  Neuro Re-Ed             Eccentric step downs  X10            SLS      4x30\" with foam       Lat hip hike      2x10                                                            Ther Ex             SLR HEP 3x10  3x10  2x10 2# AW 2x10 2# AW        Heel slides HEP            Bridges HEP 2x10 5sh 2x10 5sh 2x10 5sh 2x15 5sh        Step ups             LAQ  3x10 3# AW 5 second eccentric lowering  3x10 4# AW eccentric focus  3x10 4# AW eccentric focus          STS  2x10 5 sec eccentric  2x10 5 sec eccentric   3x10 w/ BMB        Leg Press    95# x10; 115# 2x10   115# x12; 125# x12;  135# x12       Wall ball squat   X10 discontinued 2x10  2x10  2x10        Bike   6 " mins 6 mins 6 mins 6 mins       Deadlifts    NV 4# db 2x10  4# db 2x10  5# db x10; 10# db 2x10        RDLs   NV          Supine quad stretch     3x1'        Side Stepping      GTB 4 laps        Ther Activity                                       Gait Training                                       Modalities

## 2025-01-07 ENCOUNTER — TELEPHONE (OUTPATIENT)
Age: 60
End: 2025-01-07

## 2025-01-07 NOTE — TELEPHONE ENCOUNTER
Caller: Patient    Doctor: Lucas    Reason for call: Patient called requested updated work note to state return to work 1/26/2025. Patient stated she will  work note when completed in office. Please advise.    Call back#: [phone]

## 2025-01-08 ENCOUNTER — OFFICE VISIT (OUTPATIENT)
Age: 60
End: 2025-01-08
Payer: COMMERCIAL

## 2025-01-08 ENCOUNTER — OFFICE VISIT (OUTPATIENT)
Dept: NEPHROLOGY | Facility: CLINIC | Age: 60
End: 2025-01-08
Payer: COMMERCIAL

## 2025-01-08 VITALS
BODY MASS INDEX: 30.84 KG/M2 | RESPIRATION RATE: 18 BRPM | TEMPERATURE: 96.7 F | WEIGHT: 167.6 LBS | HEIGHT: 62 IN | DIASTOLIC BLOOD PRESSURE: 76 MMHG | SYSTOLIC BLOOD PRESSURE: 118 MMHG | HEART RATE: 78 BPM | OXYGEN SATURATION: 96 %

## 2025-01-08 VITALS
TEMPERATURE: 97.9 F | WEIGHT: 164 LBS | OXYGEN SATURATION: 98 % | RESPIRATION RATE: 18 BRPM | SYSTOLIC BLOOD PRESSURE: 133 MMHG | DIASTOLIC BLOOD PRESSURE: 88 MMHG | BODY MASS INDEX: 30.18 KG/M2 | HEART RATE: 84 BPM | HEIGHT: 62 IN

## 2025-01-08 DIAGNOSIS — N18.2 CKD (CHRONIC KIDNEY DISEASE) STAGE 2, GFR 60-89 ML/MIN: ICD-10-CM

## 2025-01-08 DIAGNOSIS — N18.31 STAGE 3A CHRONIC KIDNEY DISEASE (HCC): ICD-10-CM

## 2025-01-08 DIAGNOSIS — I10 ESSENTIAL HYPERTENSION: ICD-10-CM

## 2025-01-08 DIAGNOSIS — N18.2 TYPE 2 DIABETES MELLITUS WITH STAGE 2 CHRONIC KIDNEY DISEASE, WITHOUT LONG-TERM CURRENT USE OF INSULIN  (HCC): ICD-10-CM

## 2025-01-08 DIAGNOSIS — R80.1 PERSISTENT PROTEINURIA: Primary | ICD-10-CM

## 2025-01-08 DIAGNOSIS — N18.2 CHRONIC KIDNEY DISEASE, STAGE II (MILD): ICD-10-CM

## 2025-01-08 DIAGNOSIS — Z00.00 ANNUAL PHYSICAL EXAM: Primary | ICD-10-CM

## 2025-01-08 DIAGNOSIS — E78.2 MIXED HYPERLIPIDEMIA: ICD-10-CM

## 2025-01-08 DIAGNOSIS — E11.22 TYPE 2 DIABETES MELLITUS WITH STAGE 2 CHRONIC KIDNEY DISEASE, WITHOUT LONG-TERM CURRENT USE OF INSULIN  (HCC): ICD-10-CM

## 2025-01-08 DIAGNOSIS — E11.65 TYPE 2 DIABETES MELLITUS WITH HYPERGLYCEMIA, WITHOUT LONG-TERM CURRENT USE OF INSULIN (HCC): ICD-10-CM

## 2025-01-08 PROCEDURE — 99214 OFFICE O/P EST MOD 30 MIN: CPT | Performed by: FAMILY MEDICINE

## 2025-01-08 PROCEDURE — 99396 PREV VISIT EST AGE 40-64: CPT | Performed by: FAMILY MEDICINE

## 2025-01-08 PROCEDURE — 99214 OFFICE O/P EST MOD 30 MIN: CPT | Performed by: INTERNAL MEDICINE

## 2025-01-08 RX ORDER — LOSARTAN POTASSIUM 25 MG/1
12.5 TABLET ORAL DAILY
Qty: 90 TABLET | Refills: 3 | Status: SHIPPED | OUTPATIENT
Start: 2025-01-08

## 2025-01-08 RX ORDER — TIRZEPATIDE 2.5 MG/.5ML
2.5 INJECTION, SOLUTION SUBCUTANEOUS WEEKLY
Qty: 2 ML | Refills: 0 | Status: SHIPPED | OUTPATIENT
Start: 2025-01-08

## 2025-01-08 RX ORDER — TIRZEPATIDE 5 MG/.5ML
5 INJECTION, SOLUTION SUBCUTANEOUS WEEKLY
Qty: 6 ML | Refills: 0 | Status: SHIPPED | OUTPATIENT
Start: 2025-02-05

## 2025-01-08 NOTE — ASSESSMENT & PLAN NOTE
Lab Results   Component Value Date    EGFR 43 12/30/2024    EGFR 45 11/05/2024    EGFR 47 10/17/2024    CREATININE 1.32 (H) 12/30/2024    CREATININE 1.27 11/05/2024    CREATININE 1.22 10/17/2024     Labs reviewed & discussed   Assessment: stable GFR in the 40s.  Discussed Jardiance versus GLP-1 for recent diagnosis of diabetes.  Would benefit from better management of diabetes to put less burden on the kidneys to avoid further progression of disease process.  Patient agreeable to starting medication.  Improve lifestyle and dietary changes to slow its progression as discussed  Low-sodium, low-carb diet, limit snacking, exercise regularly.  Avoid nephrotoxic agents including over-the-counter NSAIDs as discussed  Hydrate well with water.

## 2025-01-08 NOTE — ASSESSMENT & PLAN NOTE
Lab Results   Component Value Date    HGBA1C 6.9 (H) 12/30/2024     Currently not prescribed any meds.   Currently not on statin & on ACEi/ARB  Assessment: Previously chronically prediabetic now officially diabetic range.  As discussed between Jardiance versus GLP-1, at this time we will avoid metformin with GFR 43 . Previous A1c 6.4.  Med changes: Yes, Mounjaro 2.5 mg once weekly injection for 4 weeks then increase to 5 mg once a week, discussed common side effect.    Advised low-carb, low-sodium diet and limit snacking, really working on building good habits with nutritional intake and exercising regularly.   Reviewed hypoglycemia protocol, will monitor and discuss frequency at each apt with plan to adjust management to avoid occurences   Follow up with A1c at 3-6 month increments.   Return in 3 months for close monitoring.   Orders:  •  Tirzepatide (Mounjaro) 2.5 MG/0.5ML SOAJ; Inject 2.5 mg under the skin once a week  •  Tirzepatide (Mounjaro) 5 MG/0.5ML SOAJ; Inject 5 mg under the skin once a week Do not start before February 5, 2025.

## 2025-01-08 NOTE — ASSESSMENT & PLAN NOTE
Lab Results   Component Value Date    HGBA1C 6.9 (H) 12/30/2024     Patient was diagnosed with diabetes mellitus type 2 given elevated hemoglobin A1c.  She was prescribed Mounjaro by her PCP.

## 2025-01-08 NOTE — TELEPHONE ENCOUNTER
Caller: Patient    Doctor: Lucas    Reason for call: Patient is calling in regards to work letter and states she needs the work letter releasing her for work with no restrictions; please advise.     Call back#: [phone]

## 2025-01-08 NOTE — ASSESSMENT & PLAN NOTE
Lab Results   Component Value Date    EGFR 43 12/30/2024    EGFR 45 11/05/2024    EGFR 47 10/17/2024    CREATININE 1.32 (H) 12/30/2024    CREATININE 1.27 11/05/2024    CREATININE 1.22 10/17/2024   Most recent labs revealing elevated creatinine of 1.32 mg/dL and though obtaining a state of osmotic diuresis and urinary tract infection.  Her baseline serum creatinine has been in the range of 1.2 mg/dL instead.

## 2025-01-08 NOTE — PROGRESS NOTES
[facility]  Annual Physical & Office Visit     PATIENT INFORMATION   Name: Fartun Hurley   YOB: 1965   MRN: [id number]  Encounter Provider: Andres Hicks MD    Encounter Date: 1/8/2025    ASSESSMENT & PLAN     Assessment & Plan  Annual physical exam  Healthcare Maintenance  Health maintenance completed today  - Medical history reviewed, including existing medical conditions, medications, and surgeries.   - Labs discussed to evaluate cholesterol, blood sugar, kidney function, liver function, and other important markers of health.  - BMI evaluated and discussed.  - Lifestyle and health counseling completed including diet, exercise habits, smoking status, alcohol consumption.   - Bone & Heart health reviewed  - Cancer screenings discussed: Mammogram/Pap smear/CT lung/colonoscopy.   - Vaccination status reviewed and pertinent immunizations and booster shots discussed.  - Skin examination: Discussed importance of sunscreen and other preventative measures for skin cancer.  - Mental health and wellbeing evaluated and discussed.  - Family history obtained to identify any of hereditary health risks.  Lab orders in place as discussed  Start/continue preventative measures as discussed/advised  Complete preventative orders in place as recommended.   Refer to screenings problem list        Essential hypertension  BP Readings from Last 3 Encounters:   01/08/25 133/88   01/08/25 118/76   12/19/24 128/90      Currently prescribed nifedipine ER 60 mg daily, losartan 25 mg daily  Reports: Reports taking as prescribed.  Previously not taking medications as recommended  Assessment: Improving  Med changes: None. Continue current regimen.   Lifestyle modifications recommended, including reduced sodium intake, regular exercise, maintaining a healthy weight, limiting alcohol consumption, and/or avoiding cig smoking.  Return in 3 months for close monitoring.        Type 2 diabetes mellitus with  hyperglycemia, without long-term current use of insulin (HCC)    Lab Results   Component Value Date    HGBA1C 6.9 (H) 12/30/2024     Currently not prescribed any meds.   Currently not on statin & on ACEi/ARB  Assessment: Previously chronically prediabetic now officially diabetic range.  As discussed between Jardiance versus GLP-1, at this time we will avoid metformin with GFR 43 . Previous A1c 6.4.  Med changes: Yes, Mounjaro 2.5 mg once weekly injection for 4 weeks then increase to 5 mg once a week, discussed common side effect.    Advised low-carb, low-sodium diet and limit snacking, really working on building good habits with nutritional intake and exercising regularly.   Reviewed hypoglycemia protocol, will monitor and discuss frequency at each apt with plan to adjust management to avoid occurences   Follow up with A1c at 3-6 month increments.   Return in 3 months for close monitoring.   Orders:  •  Tirzepatide (Mounjaro) 2.5 MG/0.5ML SOAJ; Inject 2.5 mg under the skin once a week  •  Tirzepatide (Mounjaro) 5 MG/0.5ML SOAJ; Inject 5 mg under the skin once a week Do not start before February 5, 2025.    Stage 3a chronic kidney disease (HCC)  Lab Results   Component Value Date    EGFR 43 12/30/2024    EGFR 45 11/05/2024    EGFR 47 10/17/2024    CREATININE 1.32 (H) 12/30/2024    CREATININE 1.27 11/05/2024    CREATININE 1.22 10/17/2024     Labs reviewed & discussed   Assessment: stable GFR in the 40s.  Discussed Jardiance versus GLP-1 for recent diagnosis of diabetes.  Would benefit from better management of diabetes to put less burden on the kidneys to avoid further progression of disease process.  Patient agreeable to starting medication.  Improve lifestyle and dietary changes to slow its progression as discussed  Low-sodium, low-carb diet, limit snacking, exercise regularly.  Avoid nephrotoxic agents including over-the-counter NSAIDs as discussed  Hydrate well with water.             COUNSELING    Alcohol/drug  use: discussed moderation in alcohol intake, the recommendations for healthy alcohol use, and avoidance of illicit drug use.  Dental Health: discussed importance of regular tooth brushing, flossing, and dental visits.  Injury prevention: discussed safety/seat belts, safety helmets, smoke detectors, carbon monoxide detectors, and smoking near bedding or upholstery.  Sexual health: discussed sexually transmitted diseases, partner selection, use of condoms, avoidance of unintended pregnancy, and contraceptive alternatives.  Exercise: the importance of regular exercise/physical activity was discussed. Recommend exercise 3-5 times per week for at least 30 minutes.     Depression Screening and Follow-up Plan: Patient was screened for depression during today's encounter. They screened negative with a PHQ-2 score of 0.      HEALTH MAINTENANCE     Immunization History   Administered Date(s) Administered   • COVID-19 PFIZER VACCINE 0.3 ML IM 05/30/2021, 06/20/2021, 12/22/2021   • INFLUENZA 08/31/2017, 01/01/2021, 01/01/2021   • Influenza Quadrivalent Preservative Free 3 years and older IM 12/22/2021     Pap smear:09/01/2020  Mammogram:05/30/2024   Colonoscopy:09/23/2024 09/23/2024  Cologuard:Not on file   DEXA scan:05/30/2024    FOLLOW UP   Return in about 3 months (around 4/8/2025) for diabetes management.    CURRENT MEDICATIONS     Current Outpatient Medications:   •  hydrOXYzine HCL (ATARAX) 25 mg tablet, TAKE 1 TABLET (25 MG TOTAL) BY MOUTH IF NEEDED (SLEEP), Disp: 90 tablet, Rfl: 1  •  multivitamin (THERAGRAN) TABS, Take 1 tablet by mouth daily, Disp: , Rfl:   •  NIFEdipine ER (ADALAT CC) 60 MG 24 hr tablet, TAKE 1 TABLET BY MOUTH EVERY DAY, Disp: 90 tablet, Rfl: 1  •  Omega-3 Fatty Acids (Fish Oil) 600 MG CAPS, Take by mouth, Disp: , Rfl:   •  Tirzepatide (Mounjaro) 2.5 MG/0.5ML SOAJ, Inject 2.5 mg under the skin once a week, Disp: 2 mL, Rfl: 0  •  [START ON 2/5/2025] Tirzepatide (Mounjaro) 5 MG/0.5ML SOAJ, Inject 5 mg  under the skin once a week Do not start before February 5, 2025., Disp: 6 mL, Rfl: 0  •  losartan (COZAAR) 25 mg tablet, Take 0.5 tablets (12.5 mg total) by mouth daily, Disp: 90 tablet, Rfl: 3    ANNUAL PHYSICAL QUESTIONNAIRE    History of Present Illness     Fartun Hurley is a 59 y.o. who is here for annual physical exam.  History obtained from : patient  HPI    Concerns today: no    LIFESTYLE  Nutritional intake: well balanced diet  Plain water hydration: fair  Exercise and Movement: getting less than 5,000 steps per day and 1-2 times a week on average  Do you struggle with weight?  yes  Sleep: gets 4-6 hours of sleep on average and drinking a lot of fluids at night      MENTAL HEALTH  PHQ-2/9 Depression Screening    Little interest or pleasure in doing things: 0 - not at all  Feeling down, depressed, or hopeless: 0 - not at all  PHQ-2 Score: 0  PHQ-2 Interpretation: Negative depression screen       Anxiety: no  History of SI/SH: no  Significant past trauma that has impacted patient's mental health: no  Coping mechanisms with life's worries and obstacles: no    VISION, HEARING, DENTAL HEALTH  Hearing: normal - bilateral  Vision: goes for regular eye exams, wears glasses, and wears contacts  Dental: regular dental visits and brushes teeth twice daily    SUBSTANCE USE  Smoking Cig: non-smoker  Vaping: no  Current use of recreational drugs: no   History of substance use: no   Alcohol consumption: Yes Moderate    WOMEN'S HEALTH  Follow gynecology: no  Menstrual cycles: no  Sexually active: Yes - single partner - male  Do you have any problems with urination? no nocturia 3 times per night    FAMILY HISTORY, RISK FACTORS  Do you have any health concerns based on your family history or your risk factors? yes    PATIENT AWARENESS   Do you know what medications you are on and why? yes  Do you check your blood pressures at home? yes  Do you try your best to follow-up with your health screenings? yes  Do you track  your health metrics? yes    QUALITY OF LIFE   Do you have hobbies you enjoy? yes  Do you have a support system? yes      Review of Systems   Constitutional:  Negative for chills, fever and unexpected weight change.   Respiratory:  Negative for chest tightness and shortness of breath.    Cardiovascular:  Negative for chest pain.   Gastrointestinal:  Negative for abdominal pain, constipation, diarrhea, nausea and vomiting.   Endocrine: Negative for polydipsia and polyuria.   Genitourinary:  Negative for dysuria and hematuria.   Neurological:  Negative for dizziness, weakness, light-headedness and headaches.       ALLERGIES    No Known Allergies    PAST MEDICAL & SURGICAL HISTORY      Past Medical History:   Diagnosis Date   • Chronic kidney disease    • Delayed emergence from anesthesia    • High blood pressure    • High cholesterol    • Hypertension 14yr ago   • Pre-diabetes      Past Surgical History:   Procedure Laterality Date   • BREAST CYST EXCISION Left 01/01/1980    neg   • COLONOSCOPY     • KNEE SURGERY     • NH ARTHRS KNEE W/MENISCECTOMY MED&LAT W/SHAVING Right 11/12/2024    Procedure: MENISCECTOMY MEDIAL- Right knee arthroscopic partial medial menisectomy;  Surgeon: Robin Zavala DO;  Location: [facility];  Service: Orthopedics   • PTERYGIUM EXCISION Bilateral     eyes       FAMILY HISTORY & SOCIAL HISTORY      Family History   Problem Relation Age of Onset   • Diabetes Mother    • Heart disease Mother    • Alzheimer's disease Father    • Hypertension Father    • Dementia Father    • No Known Problems Sister    • No Known Problems Sister    • No Known Problems Sister    • No Known Problems Sister    • No Known Problems Daughter    • No Known Problems Daughter    • No Known Problems Maternal Grandmother    • No Known Problems Paternal Grandmother    • No Known Problems Maternal Aunt    • No Known Problems Maternal Aunt    • No Known Problems Paternal Aunt    • Breast cancer Other 66   • Breast cancer  "Other 66   • Colon cancer Neg Hx    • Ovarian cancer Neg Hx    • Uterine cancer Neg Hx    • Cervical cancer Neg Hx       Social History     Tobacco Use   • Smoking status: Never     Passive exposure: Never   • Smokeless tobacco: Never   Vaping Use   • Vaping status: Never Used   Substance and Sexual Activity   • Alcohol use: Yes     Alcohol/week: 2.0 standard drinks of alcohol     Types: 2 Glasses of wine per week     Comment: socially   • Drug use: No   • Sexual activity: Yes     Partners: Male     Birth control/protection: Male Sterilization        OBJECTIVES      /76 (BP Location: Left arm, Patient Position: Sitting, Cuff Size: Large)   Pulse 78   Temp (!) 96.7 °F (35.9 °C) (Tympanic)   Resp 18   Ht 5' 2\" (1.575 m)   Wt 76 kg (167 lb 9.6 oz) Comment: with boots on  SpO2 96%   BMI 30.65 kg/m²    Physical Exam  Vitals reviewed.   Constitutional:       General: She is not in acute distress.     Appearance: Normal appearance. She is obese. She is not ill-appearing, toxic-appearing or diaphoretic.   HENT:      Head: Normocephalic and atraumatic.      Right Ear: External ear normal.      Left Ear: External ear normal.      Nose: Nose normal.      Mouth/Throat:      Mouth: Mucous membranes are moist.   Eyes:      General: No scleral icterus.        Right eye: No discharge.         Left eye: No discharge.      Extraocular Movements: Extraocular movements intact.      Conjunctiva/sclera: Conjunctivae normal.   Cardiovascular:      Rate and Rhythm: Normal rate and regular rhythm.      Pulses: Normal pulses.      Heart sounds: Normal heart sounds.   Pulmonary:      Effort: Pulmonary effort is normal. No respiratory distress.      Breath sounds: Normal breath sounds.   Abdominal:      Palpations: Abdomen is soft.      Tenderness: There is no abdominal tenderness.   Musculoskeletal:         General: No swelling. Normal range of motion.      Cervical back: Normal range of motion.   Skin:     General: Skin is warm " and dry.   Neurological:      General: No focal deficit present.      Mental Status: She is alert and oriented to person, place, and time.   Psychiatric:         Mood and Affect: Mood normal.         Behavior: Behavior normal.         Thought Content: Thought content normal.           Andres Hicks MD  Family Medicine Physician   [facility] [place]      Administrative Statements     Medications have been reviewed by provider in current encounter

## 2025-01-08 NOTE — ASSESSMENT & PLAN NOTE
BP Readings from Last 3 Encounters:   01/08/25 133/88   01/08/25 118/76   12/19/24 128/90      Currently prescribed nifedipine ER 60 mg daily, losartan 25 mg daily  Reports: Reports taking as prescribed.  Previously not taking medications as recommended  Assessment: Improving  Med changes: None. Continue current regimen.   Lifestyle modifications recommended, including reduced sodium intake, regular exercise, maintaining a healthy weight, limiting alcohol consumption, and/or avoiding cig smoking.  Return in 3 months for close monitoring.

## 2025-01-08 NOTE — ASSESSMENT & PLAN NOTE
Most recent lipid study were done in a nonfasting state which had revealed elevated LDL and total cholesterol levels.  Recommended repeating lipid profile study in a fasting state instead.

## 2025-01-08 NOTE — PROGRESS NOTES
"Daily Note     Today's date: 2025  Patient name: Fartun Hurley  : 1965  MRN: [id number]  Referring provider: Samantha Baker PA-C  Dx:   Encounter Diagnosis     ICD-10-CM    1. S/P right knee arthroscopy  Z98.890           Start Time: 1459  Stop Time: 1537  Total time in clinic (min): 38 minutes    Subjective: She reports of overall stiffness in her R knee in the morning. Coming in, her knee feels okay, she notices there is less pain with descending stairs.       Objective: See treatment diary below      Assessment: Tolerated treatment well. She had increased fatigue after completion of eccentric quad exercises. Patient demonstrated fatigue post treatment, exhibited good technique with therapeutic exercises, and would benefit from continued PT.      Plan: Continue per plan of care.      Precautions: HTN, hernia, CKD  POC expires Unit limit Auth Expiration date PT/OT/ST + Visit Limit?   1/15/25 3 required 60                           Visit/Unit Tracking  AUTH Status:  Date         12 visits Used 1 2 3 4 5 6 7         Remaining  11 10 9 8 7 6 5          [email or web address]  Access Code: [email or web address]    Manuals       Traction 4 mins    4 mins        A-P glides 4 mins 4 mins   4 mins                                  Neuro Re-Ed             Eccentric step downs  X10            SLS      4x30\" with foam 4x30\" with foam      Lat hip hike      2x10  2x10                                                           Ther Ex             SLR HEP 3x10  3x10  2x10 2# AW 2x10 2# AW        Heel slides HEP            Bridges HEP 2x10 5sh 2x10 5sh 2x10 5sh 2x15 5sh        Step ups             LAQ  3x10 3# AW 5 second eccentric lowering  3x10 4# AW eccentric focus  3x10 4# AW eccentric focus          STS  2x10 5 sec eccentric  2x10 5 sec eccentric   3x10 w/ BMB  3x10 w/ BMB       Leg Press    95# x10; 115# 2x10   115# x12; 125# " x12;  135# x12 125# x12; 145# 2x12      Wall ball squat   X10 discontinued 2x10  2x10  2x10        Bike   6 mins 6 mins 6 mins 6 mins 6 min      Deadlifts    NV 4# db 2x10  4# db 2x10  5# db x10; 10# db 2x10  20# KB 2x10       RDLs   NV          Supine quad stretch     3x1'        TRX lunges        2x10 b/l      Side Stepping      GTB 4 laps  [place] 15# x5       Ther Activity                                       Gait Training                                       Modalities

## 2025-01-08 NOTE — PROGRESS NOTES
Name: Fartun Hurley      : 1965      MRN: [id number]  Encounter Provider: Steve Pimentel MD  Encounter Date: 2025   Encounter department: [facility]  :  Assessment & Plan  Persistent proteinuria    Orders:    losartan (COZAAR) 25 mg tablet; Take 0.5 tablets (12.5 mg total) by mouth daily    Urinalysis with microscopic; Future    Albumin / creatinine urine ratio; Future    Urinalysis had revealed 2+ protein urine.  Initiate patient on losartan 12.5 mg once daily.  Urinalysis and urine albumin creatinine ratio in 3 months.  Type 2 diabetes mellitus with stage 2 chronic kidney disease, without long-term current use of insulin  (HCC)    Lab Results   Component Value Date    HGBA1C 6.9 (H) 2024     Patient was diagnosed with diabetes mellitus type 2 given elevated hemoglobin A1c.  She was prescribed Mounjaro by her PCP.       Essential hypertension  Blood pressure within acceptable range at 133/88 while on nifedipine.       Mixed hyperlipidemia  Most recent lipid study were done in a nonfasting state which had revealed elevated LDL and total cholesterol levels.  Recommended repeating lipid profile study in a fasting state instead.       Stage 3a chronic kidney disease (HCC)  Lab Results   Component Value Date    EGFR 43 2024    EGFR 45 2024    EGFR 47 10/17/2024    CREATININE 1.32 (H) 2024    CREATININE 1.27 2024    CREATININE 1.22 10/17/2024   Most recent labs revealing elevated creatinine of 1.32 mg/dL and though obtaining a state of osmotic diuresis and urinary tract infection.  Her baseline serum creatinine has been in the range of 1.2 mg/dL instead.             History of Present Illness   HPI  Fartun Hurley is a 59 y.o. female who presents to renal office for management of CKD.  Patient underwent urine testing last week which revealed glycosuria and urinary tract infection.  Patient denies having symptoms of UTI.   "Admits to being placed on Mounjaro.  History obtained from: patient's POA    Review of Systems   Constitutional: Negative.    HENT: Negative.     Eyes: Negative.    Respiratory: Negative.     Cardiovascular: Negative.    Gastrointestinal: Negative.    Endocrine: Negative.    Genitourinary: Negative.    Musculoskeletal: Negative.    Skin: Negative.    Allergic/Immunologic: Negative.    Neurological: Negative.    Hematological: Negative.    All other systems reviewed and are negative.    Medical History Reviewed by provider this encounter:     .     Objective   /88 (Patient Position: Sitting, Cuff Size: Adult)   Pulse 84   Temp 97.9 °F (36.6 °C) (Temporal)   Resp 18   Ht 5' 2\" (1.575 m)   Wt 74.4 kg (164 lb)   SpO2 98%   BMI 30.00 kg/m²      Physical Exam  Constitutional:       Appearance: She is well-developed.   HENT:      Head: Normocephalic and atraumatic.   Eyes:      Pupils: Pupils are equal, round, and reactive to light.   Cardiovascular:      Rate and Rhythm: Normal rate and regular rhythm.      Heart sounds: Normal heart sounds.   Pulmonary:      Effort: Pulmonary effort is normal.   Abdominal:      General: Bowel sounds are normal.      Palpations: Abdomen is soft.   Musculoskeletal:         General: Normal range of motion.      Cervical back: Neck supple.   Skin:     General: Skin is warm.   Neurological:      Mental Status: She is alert and oriented to person, place, and time.         Administrative Statements   I have spent a total time of 31 minutes in caring for this patient on the day of the visit/encounter including Diagnostic results, Prognosis, Risks and benefits of tx options, Instructions for management, and Importance of tx compliance.   "

## 2025-01-09 ENCOUNTER — OFFICE VISIT (OUTPATIENT)
Dept: PHYSICAL THERAPY | Facility: CLINIC | Age: 60
End: 2025-01-09
Payer: COMMERCIAL

## 2025-01-09 ENCOUNTER — TELEPHONE (OUTPATIENT)
Age: 60
End: 2025-01-09

## 2025-01-09 DIAGNOSIS — Z98.890 S/P RIGHT KNEE ARTHROSCOPY: Primary | ICD-10-CM

## 2025-01-09 PROCEDURE — 97112 NEUROMUSCULAR REEDUCATION: CPT

## 2025-01-09 PROCEDURE — 97110 THERAPEUTIC EXERCISES: CPT

## 2025-01-09 NOTE — TELEPHONE ENCOUNTER
PA for Mounjaro) 2.5 MG/0.5ML SOAJ  APPROVED     Date(s) approved December 10, 2024 to January 9, 2026     Case #[id number]       Patient advised by          []MyChart Message  []Phone call   []LMOM  []L/M to call office as no active Communication consent on file  []Unable to leave detailed message as VM not approved on Communication consent       Pharmacy advised by    [x]Fax  []Phone call    Approval letter scanned into Media No Not available at decision

## 2025-01-09 NOTE — TELEPHONE ENCOUNTER
PA for Mounjaro) 2.5 MG/0.5ML SOAJ SUBMITTED to     via    []CMM-KEY:   [x]Surescripts-Case ID # [id number]   []Availity-Auth ID # NDC #   []Faxed to plan   []Other website   []Phone call Case ID #     [x]PA sent as URGENT    All office notes, labs and other pertaining documents and studies sent. Clinical questions answered. Awaiting determination from insurance company.     Turnaround time for your insurance to make a decision on your Prior Authorization can take 7-21 business days.

## 2025-01-09 NOTE — TELEPHONE ENCOUNTER
Medication Mounjaro 2.5 MG/0.5 ml     Keycode [id number]    Pharmacy [facility] [phone]      Comments : paperwork scanned in media  - this is for the rx dated 01/08/2025       Medication Mounjaro 5mg /0.5 ml     Keycode [id number]    Pharmacy  [facility] [phone]       Comments : paperwork scanned in media  - this is for rx dated 02/05/2025

## 2025-01-13 ENCOUNTER — APPOINTMENT (OUTPATIENT)
Dept: PHYSICAL THERAPY | Facility: CLINIC | Age: 60
End: 2025-01-13
Payer: COMMERCIAL

## 2025-01-13 DIAGNOSIS — Z98.890 S/P RIGHT KNEE ARTHROSCOPY: Primary | ICD-10-CM

## 2025-01-13 NOTE — PROGRESS NOTES
"Daily Note     Today's date: 2025  Patient name: Fartun Hurley  : 1965  MRN: [id number]  Referring provider: Samantha Baker PA-C  Dx:   Encounter Diagnosis     ICD-10-CM    1. S/P right knee arthroscopy  Z98.890                      Subjective: ***      Objective: See treatment diary below      Assessment: Tolerated treatment {Tolerated treatment :[id number]}. Patient {assessment:1952946464}      Plan: {PLAN:5739114985}     Precautions: HTN, hernia, CKD  POC expires Unit limit Auth Expiration date PT/OT/ST + Visit Limit?   1/15/25 3 required 60                           Visit/Unit Tracking  AUTH Status:  Date        12 visits Used 1 2 3 4 5 6 7 8        Remaining  11 10 9 8 7 6 5 4         [email or web address]  Access Code: [email or web address]    Manuals      Traction 4 mins    4 mins        A-P glides 4 mins 4 mins   4 mins                                  Neuro Re-Ed             Eccentric step downs  X10            SLS      4x30\" with foam 4x30\" with foam      Lat hip hike      2x10  2x10                                                           Ther Ex             SLR HEP 3x10  3x10  2x10 2# AW 2x10 2# AW        Heel slides HEP            Bridges HEP 2x10 5sh 2x10 5sh 2x10 5sh 2x15 5sh        Step ups             LAQ  3x10 3# AW 5 second eccentric lowering  3x10 4# AW eccentric focus  3x10 4# AW eccentric focus          STS  2x10 5 sec eccentric  2x10 5 sec eccentric   3x10 w/ BMB  3x10 w/ BMB       Leg Press    95# x10; 115# 2x10   115# x12; 125# x12;  135# x12 125# x12; 145# 2x12      Wall ball squat   X10 discontinued 2x10  2x10  2x10        Bike   6 mins 6 mins 6 mins 6 mins 6 min      Deadlifts    NV 4# db 2x10  4# db 2x10  5# db x10; 10# db 2x10  20# KB 2x10       RDLs   NV          Supine quad stretch     3x1'        TRX lunges        2x10 b/l      Side Stepping      GTB 4 laps  [place] 15# x5     "   Ther Activity                                       Gait Training                                       Modalities

## 2025-01-15 NOTE — PROGRESS NOTES
"Daily Note     Today's date: 1/15/2025  Patient name: Fartun Hurley  : 1965  MRN: [id number]  Referring provider: Samantha Baker PA-C  Dx:   Encounter Diagnosis     ICD-10-CM    1. S/P right knee arthroscopy  Z98.890                      Subjective: ***      Objective: See treatment diary below      Assessment: Tolerated treatment {Tolerated treatment :5565867528}. Patient {assessment:3539799664}      Plan: {PLAN:9651182367}     Precautions: HTN, hernia, CKD  POC expires Unit limit Auth Expiration date PT/OT/ST + Visit Limit?   1/15/25 3 required 60                           Visit/Unit Tracking  AUTH Status:  Date       12 visits Used 1 2 3 4 5 6 7 8 9       Remaining  11 10 9 8 7 6 5 4 3        [email or web address]  Access Code: [email or web address]    Manuals     Traction 4 mins    4 mins        A-P glides 4 mins 4 mins   4 mins                                  Neuro Re-Ed             Eccentric step downs  X10            SLS      4x30\" with foam 4x30\" with foam      Lat hip hike      2x10  2x10                                                           Ther Ex             SLR HEP 3x10  3x10  2x10 2# AW 2x10 2# AW        Heel slides HEP            Bridges HEP 2x10 5sh 2x10 5sh 2x10 5sh 2x15 5sh        Step ups             LAQ  3x10 3# AW 5 second eccentric lowering  3x10 4# AW eccentric focus  3x10 4# AW eccentric focus          STS  2x10 5 sec eccentric  2x10 5 sec eccentric   3x10 w/ BMB  3x10 w/ BMB       Leg Press    95# x10; 115# 2x10   115# x12; 125# x12;  135# x12 125# x12; 145# 2x12      Wall ball squat   X10 discontinued 2x10  2x10  2x10        Bike   6 mins 6 mins 6 mins 6 mins 6 min      Deadlifts    NV 4# db 2x10  4# db 2x10  5# db x10; 10# db 2x10  20# KB 2x10       RDLs   NV          Supine quad stretch     3x1'        TRX lunges        2x10 b/l      Side Stepping      GTB 4 laps  Tanvir 15# " x5       Ther Activity                                       Gait Training                                       Modalities

## 2025-01-16 ENCOUNTER — APPOINTMENT (OUTPATIENT)
Dept: PHYSICAL THERAPY | Facility: CLINIC | Age: 60
End: 2025-01-16
Payer: COMMERCIAL

## 2025-01-16 DIAGNOSIS — Z98.890 S/P RIGHT KNEE ARTHROSCOPY: Primary | ICD-10-CM

## 2025-01-17 NOTE — PROGRESS NOTES
"Daily Note     Today's date: 2025  Patient name: Fartun Hurley  : 1965  MRN: [id number]  Referring provider: Samantha Baker PA-C  Dx:   Encounter Diagnosis     ICD-10-CM    1. S/P right knee arthroscopy  Z98.890                      Subjective: ***      Objective: See treatment diary below      Assessment: Tolerated treatment {Tolerated treatment :8795645957}. Patient {assessment:0739325972}      Plan: {PLAN:2765869935}     Precautions: HTN, hernia, CKD  POC expires Unit limit Auth Expiration date PT/OT/ST + Visit Limit?   1/15/25 3 required 60                           Visit/Unit Tracking  AUTH Status:  Date       12 visits Used 1 2 3 4 5 6 7 8 9       Remaining  11 10 9 8 7 6 5 4 3        [email or web address]  Access Code: [email or web address]    Manuals     Traction 4 mins    4 mins        A-P glides 4 mins 4 mins   4 mins                                  Neuro Re-Ed             Eccentric step downs  X10            SLS      4x30\" with foam 4x30\" with foam      Lat hip hike      2x10  2x10                                                           Ther Ex             SLR HEP 3x10  3x10  2x10 2# AW 2x10 2# AW        Heel slides HEP            Bridges HEP 2x10 5sh 2x10 5sh 2x10 5sh 2x15 5sh        Step ups             LAQ  3x10 3# AW 5 second eccentric lowering  3x10 4# AW eccentric focus  3x10 4# AW eccentric focus          STS  2x10 5 sec eccentric  2x10 5 sec eccentric   3x10 w/ BMB  3x10 w/ BMB       Leg Press    95# x10; 115# 2x10   115# x12; 125# x12;  135# x12 125# x12; 145# 2x12      Wall ball squat   X10 discontinued 2x10  2x10  2x10        Bike   6 mins 6 mins 6 mins 6 mins 6 min      Deadlifts    NV 4# db 2x10  4# db 2x10  5# db x10; 10# db 2x10  20# KB 2x10       RDLs   NV          Supine quad stretch     3x1'        TRX lunges        2x10 b/l      Side Stepping      GTB 4 laps  Tanvir 15# " x5       Ther Activity                                       Gait Training                                       Modalities

## 2025-01-20 ENCOUNTER — APPOINTMENT (OUTPATIENT)
Dept: PHYSICAL THERAPY | Facility: CLINIC | Age: 60
End: 2025-01-20
Payer: COMMERCIAL

## 2025-01-20 DIAGNOSIS — Z98.890 S/P RIGHT KNEE ARTHROSCOPY: Primary | ICD-10-CM

## 2025-01-23 ENCOUNTER — APPOINTMENT (OUTPATIENT)
Dept: PHYSICAL THERAPY | Facility: CLINIC | Age: 60
End: 2025-01-23
Payer: COMMERCIAL

## 2025-01-28 ENCOUNTER — OFFICE VISIT (OUTPATIENT)
Dept: PHYSICAL THERAPY | Facility: CLINIC | Age: 60
End: 2025-01-28
Payer: COMMERCIAL

## 2025-01-28 DIAGNOSIS — Z98.890 S/P RIGHT KNEE ARTHROSCOPY: Primary | ICD-10-CM

## 2025-01-28 PROCEDURE — 97110 THERAPEUTIC EXERCISES: CPT

## 2025-01-28 NOTE — PROGRESS NOTES
"Daily Note / PT Discharge    Today's date: 2025  Patient name: Fartun Hurley  : 1965  MRN: [id number]  Referring provider: Samantha Baker PA-C  Dx:   Encounter Diagnosis     ICD-10-CM    1. S/P right knee arthroscopy  Z98.890           Start Time: 1058  Stop Time: 1107  Total time in clinic (min): 9 minutes    Subjective: Pt reports that she did not want to complete any exercises today as she was still tired from work, but is now back in work which was her goal and wants to review her HEP to continue to work on strengthening. She reports of overall improvements and is happy she is able to complete her duties at work.       Objective: See treatment diary below      Assessment: Reviewed HEP with pt and answered all questions. She will be discharged from PT for her current episode.       Plan: Discharged.      Precautions: HTN, hernia, CKD  POC expires Unit limit Auth Expiration date PT/OT/ST + Visit Limit?   1/15/25 3 required 60                           Visit/Unit Tracking  AUTH Status:  Date       12 visits Used 1 2 3 4 5 6 7 8 9       Remaining  11 10 9 8 7 6 5 4 3        [email or web address]  Access Code: [email or web address]    Manuals    Traction 4 mins    4 mins        A-P glides 4 mins 4 mins   4 mins                                  Neuro Re-Ed             Eccentric step downs  X10            SLS      4x30\" with foam 4x30\" with foam      Lat hip hike      2x10  2x10                                                           Ther Ex             SLR HEP 3x10  3x10  2x10 2# AW 2x10 2# AW        Heel slides HEP            Bridges HEP 2x10 5sh 2x10 5sh 2x10 5sh 2x15 5sh        Step ups             LAQ  3x10 3# AW 5 second eccentric lowering  3x10 4# AW eccentric focus  3x10 4# AW eccentric focus          STS  2x10 5 sec eccentric  2x10 5 sec eccentric   3x10 w/ BMB  3x10 w/ BMB       Leg Press "    95# x10; 115# 2x10   115# x12; 125# x12;  135# x12 125# x12; 145# 2x12      Wall ball squat   X10 discontinued 2x10  2x10  2x10        Bike   6 mins 6 mins 6 mins 6 mins 6 min      Deadlifts    NV 4# db 2x10  4# db 2x10  5# db x10; 10# db 2x10  20# KB 2x10       RDLs   NV          Supine quad stretch     3x1'        TRX lunges        2x10 b/l      HEP review          DP   Side Stepping      GTB 4 laps  [place] 15# x5       Ther Activity                                       Gait Training                                       Modalities

## 2025-01-31 ENCOUNTER — OFFICE VISIT (OUTPATIENT)
Dept: OBGYN CLINIC | Facility: CLINIC | Age: 60
End: 2025-01-31

## 2025-01-31 VITALS — WEIGHT: 166.4 LBS | BODY MASS INDEX: 30.62 KG/M2 | HEIGHT: 62 IN

## 2025-01-31 DIAGNOSIS — Z98.890 S/P RIGHT KNEE ARTHROSCOPY: Primary | ICD-10-CM

## 2025-01-31 PROCEDURE — 99024 POSTOP FOLLOW-UP VISIT: CPT | Performed by: ORTHOPAEDIC SURGERY

## 2025-01-31 NOTE — PROGRESS NOTES
"Patient Name:  Fartun Hurley  MRN:  [id number]    Assessment & Plan     1. S/P right knee arthroscopy      Approximately 11 weeks s/p Right knee arthroscopic partial medial menisectomy performed on 11/12/2024  X-rays reviewed and discussed with patient  Patient to be full weightbearing to RLE (Right Lower Extremity)  ROM as tolerated to  RLE (Right Lower Extremity)  Patient to continue with home exercise plan as prescribed.   Discussed with patient if she has any worsening pain or new symptoms to call the office to be seen.   Patient to follow up as needed.        History of the Present Illness   Fartun Hurley is a 59 y.o. female approximately 11 weeks s/p Right knee arthroscopic partial medial menisectomy performed on 11/12/2024. The patient is doing well overall. She has resumed working. She is required to do a lot of walking at work. The elevator at work has broken and she is required to perform more stairs than usual which is causing an increase in pain by the end of the day.       Review of Systems     Review of Systems   Constitutional:  Negative for chills and fever.   HENT:  Negative for congestion.    Respiratory:  Negative for cough, chest tightness and shortness of breath.    Cardiovascular:  Negative for chest pain and palpitations.   Gastrointestinal:  Negative for abdominal pain.   Endocrine: Negative for cold intolerance and heat intolerance.   Neurological:  Negative for syncope.   Psychiatric/Behavioral:  Negative for confusion.        Physical Exam     Ht 5' 2\" (1.575 m)   Wt 75.5 kg (166 lb 6.4 oz)   BMI 30.43 kg/m²     Right Knee  Surgical incisions well healed without sign of infection  There is trace effusion  There is mild tenderness present at he medial joint line.  Range of motion from 0 to 120 degrees.    The patient is able to perform a straight leg raise with 5/5 quad strength.     Calf soft, compressible and nontender   The patient is neurovascular intact " distally.      Data Review     I have personally reviewed pertinent films in PACS, and my interpretation follows.    No new images for review    Social History     Tobacco Use   • Smoking status: Never     Passive exposure: Never   • Smokeless tobacco: Never   Vaping Use   • Vaping status: Never Used   Substance Use Topics   • Alcohol use: Yes     Alcohol/week: 2.0 standard drinks of alcohol     Types: 2 Glasses of wine per week     Comment: socially   • Drug use: No           Procedures  None performed.     Eileen Renteria   Scribe Attestation    I,:  Eileen Renteria am acting as a scribe while in the presence of the attending physician.:       I,:  Robin Zavala, DO personally performed the services described in this documentation    as scribed in my presence.:

## 2025-03-08 DIAGNOSIS — E11.65 TYPE 2 DIABETES MELLITUS WITH HYPERGLYCEMIA, WITHOUT LONG-TERM CURRENT USE OF INSULIN (HCC): ICD-10-CM

## 2025-03-09 RX ORDER — TIRZEPATIDE 5 MG/.5ML
5 INJECTION, SOLUTION SUBCUTANEOUS WEEKLY
Qty: 6 ML | Refills: 0 | Status: SHIPPED | OUTPATIENT
Start: 2025-03-09

## 2025-03-10 ENCOUNTER — APPOINTMENT (OUTPATIENT)
Age: 60
End: 2025-03-10
Payer: COMMERCIAL

## 2025-03-10 DIAGNOSIS — D72.820 ATYPICAL LYMPHOCYTOSIS: ICD-10-CM

## 2025-03-10 DIAGNOSIS — D70.9 NEUTROPENIA, UNSPECIFIED TYPE (HCC): ICD-10-CM

## 2025-03-10 DIAGNOSIS — D72.820 LYMPHOCYTOSIS: ICD-10-CM

## 2025-03-10 PROCEDURE — 36415 COLL VENOUS BLD VENIPUNCTURE: CPT

## 2025-03-10 PROCEDURE — 85007 BL SMEAR W/DIFF WBC COUNT: CPT

## 2025-03-10 PROCEDURE — 83615 LACTATE (LD) (LDH) ENZYME: CPT

## 2025-03-11 LAB
EOSINOPHIL # BLD AUTO: 0.04 THOUSAND/UL (ref 0–0.61)
EOSINOPHIL NFR BLD MANUAL: 1 % (ref 0–6)
ERYTHROCYTE [DISTWIDTH] IN BLOOD BY AUTOMATED COUNT: 13.2 % (ref 11.6–15.1)
HCT VFR BLD AUTO: 41.1 % (ref 34.8–46.1)
HGB BLD-MCNC: 12.9 G/DL (ref 11.5–15.4)
LDH SERPL-CCNC: 150 U/L (ref 140–271)
LYMPHOCYTES # BLD AUTO: 2.53 THOUSAND/UL (ref 0.6–4.47)
LYMPHOCYTES # BLD AUTO: 61 %
MCH RBC QN AUTO: 26.7 PG (ref 26.8–34.3)
MCHC RBC AUTO-ENTMCNC: 31.4 G/DL (ref 31.4–37.4)
MCV RBC AUTO: 85 FL (ref 82–98)
MONOCYTES # BLD AUTO: 0.24 THOUSAND/UL (ref 0–1.22)
MONOCYTES NFR BLD AUTO: 6 % (ref 4–12)
NEUTS BAND NFR BLD MANUAL: 2 % (ref 0–8)
NEUTS SEG # BLD: 1.26 THOUSAND/UL (ref 1.81–6.82)
NEUTS SEG NFR BLD AUTO: 29 %
NRBC BLD AUTO-RTO: 0 /100 WBCS
PLATELET # BLD AUTO: 379 THOUSANDS/UL (ref 149–390)
PLATELET BLD QL SMEAR: ADEQUATE
PMV BLD AUTO: 10.9 FL (ref 8.9–12.7)
RBC # BLD AUTO: 4.84 MILLION/UL (ref 3.81–5.12)
RBC MORPH BLD: NORMAL
TOTAL CELLS COUNTED SPEC: 100
VARIANT LYMPHS # BLD AUTO: 1 % (ref 0–0)
WBC # BLD AUTO: 4.08 THOUSAND/UL (ref 4.31–10.16)

## 2025-03-31 ENCOUNTER — APPOINTMENT (OUTPATIENT)
Dept: RADIOLOGY | Facility: CLINIC | Age: 60
End: 2025-03-31
Payer: COMMERCIAL

## 2025-03-31 ENCOUNTER — OFFICE VISIT (OUTPATIENT)
Dept: OBGYN CLINIC | Facility: CLINIC | Age: 60
End: 2025-03-31
Payer: COMMERCIAL

## 2025-03-31 ENCOUNTER — RA CDI HCC (OUTPATIENT)
Dept: OTHER | Facility: HOSPITAL | Age: 60
End: 2025-03-31

## 2025-03-31 VITALS — BODY MASS INDEX: 28.71 KG/M2 | HEIGHT: 62 IN | WEIGHT: 156 LBS

## 2025-03-31 DIAGNOSIS — M25.562 ACUTE PAIN OF LEFT KNEE: Primary | ICD-10-CM

## 2025-03-31 DIAGNOSIS — M25.462 EFFUSION OF LEFT KNEE: ICD-10-CM

## 2025-03-31 DIAGNOSIS — M25.562 LEFT KNEE PAIN, UNSPECIFIED CHRONICITY: ICD-10-CM

## 2025-03-31 DIAGNOSIS — Z98.890 S/P RIGHT KNEE ARTHROSCOPY: ICD-10-CM

## 2025-03-31 PROCEDURE — 20610 DRAIN/INJ JOINT/BURSA W/O US: CPT | Performed by: ORTHOPAEDIC SURGERY

## 2025-03-31 PROCEDURE — 73564 X-RAY EXAM KNEE 4 OR MORE: CPT

## 2025-03-31 PROCEDURE — 99213 OFFICE O/P EST LOW 20 MIN: CPT | Performed by: ORTHOPAEDIC SURGERY

## 2025-03-31 RX ORDER — METHYLPREDNISOLONE ACETATE 40 MG/ML
2 INJECTION, SUSPENSION INTRA-ARTICULAR; INTRALESIONAL; INTRAMUSCULAR; SOFT TISSUE
Status: COMPLETED | OUTPATIENT
Start: 2025-03-31 | End: 2025-03-31

## 2025-03-31 RX ORDER — BUPIVACAINE HYDROCHLORIDE 2.5 MG/ML
2 INJECTION, SOLUTION INFILTRATION; PERINEURAL
Status: COMPLETED | OUTPATIENT
Start: 2025-03-31 | End: 2025-03-31

## 2025-03-31 RX ORDER — LIDOCAINE HYDROCHLORIDE 10 MG/ML
2 INJECTION, SOLUTION INFILTRATION; PERINEURAL
Status: COMPLETED | OUTPATIENT
Start: 2025-03-31 | End: 2025-03-31

## 2025-03-31 RX ADMIN — LIDOCAINE HYDROCHLORIDE 2 ML: 10 INJECTION, SOLUTION INFILTRATION; PERINEURAL at 08:00

## 2025-03-31 RX ADMIN — METHYLPREDNISOLONE ACETATE 2 ML: 40 INJECTION, SUSPENSION INTRA-ARTICULAR; INTRALESIONAL; INTRAMUSCULAR; SOFT TISSUE at 08:00

## 2025-03-31 RX ADMIN — BUPIVACAINE HYDROCHLORIDE 2 ML: 2.5 INJECTION, SOLUTION INFILTRATION; PERINEURAL at 08:00

## 2025-03-31 NOTE — PROGRESS NOTES
HCC coding opportunities          Chart Reviewed number of suggestions sent to Provider: 1  E11.36     Patients Insurance        Commercial Insurance: [employer]

## 2025-03-31 NOTE — PROGRESS NOTES
Name: Fartun Hurley      : 1965      MRN: [id number]  Encounter Provider: Robin Zavala DO  Encounter Date: 3/31/2025   Encounter department: [facility] [place]  :  Assessment & Plan  Acute pain of left knee    Effusion of left knee    S/P right knee arthroscopy         4.5  month s/p s/p Right knee arthroscopic partial medial menisectomy performed on 2024; left knee acute pain with effusion, possible meniscus tear.   X-rays reviewed and discussed with patient  In regards to right knee, symptoms have been improving as her strength and stamina have improved.  Pain was likely secondary to increase in activity and returning to normal activities status post surgical intervention.  Continue with exercises and activities as tolerated.  In regards to left knee, nonoperative treatment options were discussed in the form of oral analgesics, activity modification, formal physical therapy, and injection therapy and further diagnostic imaging in the form of MRI.  Patient declined MRI at this time. She was offered an aspiration and Toradol injection in office today. She accepted and tolerated well. Advised to ice for the next 24-48 hours and take over-the-counter anti-inflammatories and Tylenol as needed.   Follow up 3 months to re assess left knee.  If pain worsens or she develops mechanical symptoms in the interim patient was instructed to call the office for consideration for MRI of left knee.    History of the Present Illness   History of Present Illness   HPI   Fartun Hurley is a 59 y.o. female approximately 4.5  month s/p s/p Right knee arthroscopic partial medial menisectomy performed on 2024. Patient was healing well post operatively at previous visit two months ago and reports today stating two weeks after that visit her pain returned. She feels it is from standing on her feet all day at work and noticed at night time the knee is very stiff and  "swollen. She denies any mechanical symptoms of locking or buckling. Patient also noticed about two months ago her left knee would swell up on her with difficulty extending the knee after sleeping in a flexed position.  Getting up from a seated position, walking up and down stairs will increase her pain.  Pain is mainly located medial aspect of the knee that radiates posteriorly, denies discomfort distally and numbness and tingling. She has been performing home exercises for both knees which does not provide relief. She is not taking anti-inflammatories.  Patient is a diabetic.     Review of Systems     Review of Systems   Constitutional:  Negative for chills and fever.   HENT:  Negative for ear pain and sore throat.    Eyes:  Negative for pain and visual disturbance.   Respiratory:  Negative for cough and shortness of breath.    Cardiovascular:  Negative for chest pain and palpitations.   Gastrointestinal:  Negative for abdominal pain and vomiting.   Genitourinary:  Negative for dysuria and hematuria.   Musculoskeletal:  Positive for arthralgias. Negative for back pain.   Skin:  Negative for color change and rash.   Neurological:  Negative for seizures and syncope.   All other systems reviewed and are negative.      Physical Exam   Objective   Ht 5' 2\" (1.575 m)   Wt 70.8 kg (156 lb)   BMI 28.53 kg/m²        Right Knee  Surgical incision healed  Range of motion from 0 to 125.    There is no effusion.    There is no tenderness   The patient is able to perform a straight leg raise with 4+/5 quad strength with decreased muscle tone     Varus stress testing reveals stable at 0 and 30 degrees   Valgus stress testing reveals stable  at 0 and 30 degrees  Calf soft, compressible and nontender   The patient is neurovascular intact distally.      Left Knee  Range of motion from 0 to 125 with pain at terminal flexion.    There is no crepitus with range of motion.   There is trace effusion.    There is tenderness over the " "medial joint line.    The patient is able to perform a straight leg raise.    negative patellar grind test.  Anterior drawer tests is negative  negative Lachman Test.   Posterior drawer test is   negative   Varus stress testing reveals stable at 0 and 30 degrees   Valgus stress testing reveals stable at 0 and 30 degrees  Avery's testing is positive    The patient is neurovascular intact distally.       Data Review     I have personally reviewed pertinent films in PACS, and my interpretation follows.    X-rays taken 3/31/2025 of Left  knee independently reviewed and demonstrate minimal joint space narrowing to medial and patellofemoral compartment.    Lab Results   Component Value Date/Time    HGBA1C 6.9 (H) 12/30/2024 04:28 PM    ESR 13 02/15/2019 09:24 AM    CRP <1.0 05/06/2024 02:50 PM       Social History     Tobacco Use    Smoking status: Never     Passive exposure: Never    Smokeless tobacco: Never   Vaping Use    Vaping status: Never Used   Substance Use Topics    Alcohol use: Yes     Alcohol/week: 2.0 standard drinks of alcohol     Types: 2 Glasses of wine per week     Comment: socially    Drug use: No       Large joint arthrocentesis: L knee  Universal Protocol:  Consent: Verbal consent obtained.  Risks and benefits: risks, benefits and alternatives were discussed  Consent given by: patient  Time out: Immediately prior to procedure a \"time out\" was called to verify the correct patient, procedure, equipment, support staff and site/side marked as required.  Patient understanding: patient states understanding of the procedure being performed  Site marked: the operative site was marked  Supporting Documentation  Indications: pain and joint swelling   Procedure Details  Location: knee - L knee  Preparation: Patient was prepped and draped in the usual sterile fashion  Needle size: 22 G  Ultrasound guidance: no  Approach: anterolateral  Medications administered: 2 mL bupivacaine 0.25 %; 2 mL lidocaine 1 %; 2 mL " methylPREDNISolone acetate 40 mg/mL    Aspirate amount: 10 mL  Aspirate: yellow  Patient tolerance: patient tolerated the procedure well with no immediate complications  Dressing:  Sterile dressing applied          Scribe Attestation      I,:  Marlene Bosch am acting as a scribe while in the presence of the attending physician.:       I,:  Robin Zavala DO personally performed the services described in this documentation    as scribed in my presence.:

## 2025-03-31 NOTE — LETTER
March 31, 2025     Patient: Fartun Hurley  YOB: 1965  Date of Visit: 3/31/2025      To Whom it May Concern:    Fartun Hurley is under my professional care. Fartun was seen in my office on 3/31/2025. Fartun may return to work on 4/1/2025 .    If you have any questions or concerns, please don't hesitate to call.         Sincerely,          Robin Zavala,         CC: No Recipients

## 2025-04-07 ENCOUNTER — OFFICE VISIT (OUTPATIENT)
Age: 60
End: 2025-04-07
Payer: COMMERCIAL

## 2025-04-07 VITALS
HEART RATE: 78 BPM | BODY MASS INDEX: 28.78 KG/M2 | DIASTOLIC BLOOD PRESSURE: 74 MMHG | HEIGHT: 62 IN | OXYGEN SATURATION: 98 % | TEMPERATURE: 95.5 F | RESPIRATION RATE: 14 BRPM | SYSTOLIC BLOOD PRESSURE: 128 MMHG | WEIGHT: 156.4 LBS

## 2025-04-07 DIAGNOSIS — E78.2 MIXED HYPERLIPIDEMIA: ICD-10-CM

## 2025-04-07 DIAGNOSIS — E11.65 TYPE 2 DIABETES MELLITUS WITH HYPERGLYCEMIA, WITHOUT LONG-TERM CURRENT USE OF INSULIN (HCC): Primary | ICD-10-CM

## 2025-04-07 DIAGNOSIS — I10 ESSENTIAL HYPERTENSION: ICD-10-CM

## 2025-04-07 DIAGNOSIS — N18.31 STAGE 3A CHRONIC KIDNEY DISEASE (HCC): ICD-10-CM

## 2025-04-07 DIAGNOSIS — R80.1 PERSISTENT PROTEINURIA: ICD-10-CM

## 2025-04-07 LAB — SL AMB POCT HEMOGLOBIN AIC: 5.7 (ref ?–6.5)

## 2025-04-07 PROCEDURE — 83036 HEMOGLOBIN GLYCOSYLATED A1C: CPT | Performed by: FAMILY MEDICINE

## 2025-04-07 PROCEDURE — 99214 OFFICE O/P EST MOD 30 MIN: CPT | Performed by: FAMILY MEDICINE

## 2025-04-07 RX ORDER — LOSARTAN POTASSIUM 25 MG/1
25 TABLET ORAL DAILY
Qty: 100 TABLET | Refills: 1 | Status: SHIPPED | OUTPATIENT
Start: 2025-04-07

## 2025-04-07 NOTE — PROGRESS NOTES
[facility]  Ambulatory Office Visit     PATIENT INFORMATION   Name: Fartun Hurley   YOB: 1965   MRN: [id number]  Encounter Provider: Andres Hicks MD    Encounter Date: 4/7/2025    ASSESSMENT & PLAN     Assessment & Plan  Type 2 diabetes mellitus with hyperglycemia, without long-term current use of insulin (HCC)    Lab Results   Component Value Date    HGBA1C 5.7 04/07/2025     Previously patient was started on Mounjaro  Currently prescribed the following:  Mounjaro 5 mg once a week injection  Reports taking as prescribed.  Currently not on statin & on ACEi/ARB  Assessment&Plan: Controlled. Previous A1c 6.9.  Med changes: None. Continue current regimen and working on lifestyle improvement.   Advised and reviewed:  Low-carb, low-sodium diet and limit snacking, really working on building good habits with nutritional intake and exercising regularly.   Hypoglycemia protocol, will monitor and discuss frequency at each apt with plan to adjust management to avoid occurences   Return in 6 months for routine visit.   Complete blood work 1 week prior to next appointment.    Orders:  •  POCT hemoglobin A1c  •  Hemoglobin A1C; Future      Essential hypertension  BP Readings from Last 3 Encounters:   04/07/25 128/74   01/08/25 133/88   01/08/25 118/76      Currently prescribed the following:  Nifedipine 60 mg daily  Losartan 12.5 mg daily  Reports taking differently, sometimes taking every other day full tablet of 25 mg.   Assessment&Plan: Borderline  Med changes: Yes, adjust to the following:  Increase losartan to 25 mg daily  Lifestyle modifications advised  Including reduced sodium intake, regular exercise, maintaining a healthy weight, limiting alcohol consumption, and/or avoiding cig smoking.  Return in 6 months for routine visit.   Complete blood work 1 week prior to next appointment.     Orders:  •  losartan (COZAAR) 25 mg tablet; Take 1 tablet (25 mg total) by mouth  daily    Persistent proteinuria  Following nephrology  Continue care with specialist  Orders:  •  losartan (COZAAR) 25 mg tablet; Take 1 tablet (25 mg total) by mouth daily    Stage 3a chronic kidney disease (HCC)  Lab Results   Component Value Date    EGFR 43 12/30/2024    EGFR 45 11/05/2024    EGFR 47 10/17/2024    CREATININE 1.32 (H) 12/30/2024    CREATININE 1.27 11/05/2024    CREATININE 1.22 10/17/2024   Following nephrology  Labs reviewed & discussed   Assessment: stable GFR in the 40s.   Improve lifestyle and dietary changes to slow its progression as discussed  Low-sodium, low-carb diet, limit snacking, exercise regularly.  Avoid nephrotoxic agents including over-the-counter NSAIDs as discussed  Hydrate well with water.          Mixed hyperlipidemia  Elevated LDL  Not on statin, patient hesitant  Will follow-up with repeat blood work prior to next appointment in 6 months  Orders:  •  Lipid panel; Future  •  Apolipoprotein B; Future         HEALTH MAINTENANCE     Immunization History   Administered Date(s) Administered   • COVID-19 PFIZER VACCINE 0.3 ML IM 05/30/2021, 06/20/2021, 12/22/2021   • INFLUENZA 08/31/2017, 01/01/2021, 01/01/2021   • Influenza Quadrivalent Preservative Free 3 years and older IM 12/22/2021     Pap smear:09/01/2020  Mammogram:05/30/2024   Colonoscopy:09/23/2024 09/23/2024  Cologuard:Not on file   DEXA scan:05/30/2024      FOLLOW UP   Return in about 6 months (around 10/7/2025).    CURRENT MEDICATIONS     Current Outpatient Medications:   •  losartan (COZAAR) 25 mg tablet, Take 1 tablet (25 mg total) by mouth daily, Disp: 100 tablet, Rfl: 1  •  multivitamin (THERAGRAN) TABS, Take 1 tablet by mouth daily, Disp: , Rfl:   •  NIFEdipine ER (ADALAT CC) 60 MG 24 hr tablet, TAKE 1 TABLET BY MOUTH EVERY DAY, Disp: 90 tablet, Rfl: 1  •  Omega-3 Fatty Acids (Fish Oil) 600 MG CAPS, Take by mouth, Disp: , Rfl:   •  Tirzepatide (Mounjaro) 5 MG/0.5ML SOAJ, Inject 5 mg under the skin once a week, Disp:  "6 mL, Rfl: 0  •  hydrOXYzine HCL (ATARAX) 25 mg tablet, TAKE 1 TABLET (25 MG TOTAL) BY MOUTH IF NEEDED (SLEEP), Disp: 90 tablet, Rfl: 1      CHIEF COMPLIANT     Chief Complaint   Patient presents with   • Follow-up     DM        HISTORY OF PRESENT ILLNESS    History of Present Illness     History obtained from : patient  HPI  Review of Systems    PAST MEDICAL & SURGICAL HISTORY     Past Medical History:   Diagnosis Date   • Chronic kidney disease    • Delayed emergence from anesthesia    • High blood pressure    • High cholesterol    • Hypertension 14yr ago   • Pre-diabetes      Past Surgical History:   Procedure Laterality Date   • BREAST CYST EXCISION Left 01/01/1980    neg   • COLONOSCOPY     • KNEE SURGERY     • WI ARTHRS KNEE W/MENISCECTOMY MED&LAT W/SHAVING Right 11/12/2024    Procedure: MENISCECTOMY MEDIAL- Right knee arthroscopic partial medial menisectomy;  Surgeon: Robin Zavala DO;  [facility];  Service: Orthopedics   • PTERYGIUM EXCISION Bilateral     eyes       OBJECTIVES      /74 (BP Location: Left arm, Patient Position: Sitting, Cuff Size: Large)   Pulse 78   Temp (!) 95.5 °F (35.3 °C) (Tympanic)   Resp 14   Ht 5' 2\" (1.575 m)   Wt 70.9 kg (156 lb 6.4 oz)   SpO2 98%   BMI 28.61 kg/m²    Physical Exam  Vitals reviewed.   Constitutional:       General: She is not in acute distress.     Appearance: Normal appearance. She is not ill-appearing, toxic-appearing or diaphoretic.   HENT:      Head: Normocephalic and atraumatic.      Right Ear: External ear normal.      Left Ear: External ear normal.      Nose: Nose normal.      Mouth/Throat:      Mouth: Mucous membranes are moist.   Eyes:      General: No scleral icterus.        Right eye: No discharge.         Left eye: No discharge.      Extraocular Movements: Extraocular movements intact.      Conjunctiva/sclera: Conjunctivae normal.   Cardiovascular:      Rate and Rhythm: Normal rate and regular rhythm.      Pulses: Normal " pulses.      Heart sounds: Normal heart sounds.   Pulmonary:      Effort: Pulmonary effort is normal. No respiratory distress.      Breath sounds: Normal breath sounds.   Abdominal:      Palpations: Abdomen is soft.      Tenderness: There is no abdominal tenderness.   Musculoskeletal:         General: No swelling. Normal range of motion.      Cervical back: Normal range of motion.   Skin:     General: Skin is warm and dry.   Neurological:      General: No focal deficit present.      Mental Status: She is alert and oriented to person, place, and time.   Psychiatric:         Mood and Affect: Mood normal.         Behavior: Behavior normal.         Thought Content: Thought content normal.           Andres Hciks MD  Family Medicine Physician   [facility] [place]      Administrative Statements     Medications have been reviewed by provider in current encounter

## 2025-04-07 NOTE — ASSESSMENT & PLAN NOTE
Elevated LDL  Not on statin, patient hesitant  Will follow-up with repeat blood work prior to next appointment in 6 months  Orders:  •  Lipid panel; Future  •  Apolipoprotein B; Future

## 2025-04-07 NOTE — ASSESSMENT & PLAN NOTE
BP Readings from Last 3 Encounters:   04/07/25 128/74   01/08/25 133/88   01/08/25 118/76      Currently prescribed the following:  Nifedipine 60 mg daily  Losartan 12.5 mg daily  Reports taking differently, sometimes taking every other day full tablet of 25 mg.   Assessment&Plan: Borderline  Med changes: Yes, adjust to the following:  Increase losartan to 25 mg daily  Lifestyle modifications advised  Including reduced sodium intake, regular exercise, maintaining a healthy weight, limiting alcohol consumption, and/or avoiding cig smoking.  Return in 6 months for routine visit.   Complete blood work 1 week prior to next appointment.     Orders:  •  losartan (COZAAR) 25 mg tablet; Take 1 tablet (25 mg total) by mouth daily

## 2025-04-07 NOTE — ASSESSMENT & PLAN NOTE
Lab Results   Component Value Date    HGBA1C 5.7 04/07/2025     Previously patient was started on Mounjaro  Currently prescribed the following:  Mounjaro 5 mg once a week injection  Reports taking as prescribed.  Currently not on statin & on ACEi/ARB  Assessment&Plan: Controlled. Previous A1c 6.9.  Med changes: None. Continue current regimen and working on lifestyle improvement.   Advised and reviewed:  Low-carb, low-sodium diet and limit snacking, really working on building good habits with nutritional intake and exercising regularly.   Hypoglycemia protocol, will monitor and discuss frequency at each apt with plan to adjust management to avoid occurences   Return in 6 months for routine visit.   Complete blood work 1 week prior to next appointment.    Orders:  •  POCT hemoglobin A1c  •  Hemoglobin A1C; Future

## 2025-04-07 NOTE — ASSESSMENT & PLAN NOTE
Lab Results   Component Value Date    EGFR 43 12/30/2024    EGFR 45 11/05/2024    EGFR 47 10/17/2024    CREATININE 1.32 (H) 12/30/2024    CREATININE 1.27 11/05/2024    CREATININE 1.22 10/17/2024   Following nephrology  Labs reviewed & discussed   Assessment: stable GFR in the 40s.   Improve lifestyle and dietary changes to slow its progression as discussed  Low-sodium, low-carb diet, limit snacking, exercise regularly.  Avoid nephrotoxic agents including over-the-counter NSAIDs as discussed  Hydrate well with water.

## 2025-04-08 ENCOUNTER — TELEPHONE (OUTPATIENT)
Dept: HEMATOLOGY ONCOLOGY | Facility: CLINIC | Age: 60
End: 2025-04-08

## 2025-04-13 DIAGNOSIS — I10 ESSENTIAL HYPERTENSION: ICD-10-CM

## 2025-04-21 ENCOUNTER — TELEPHONE (OUTPATIENT)
Age: 60
End: 2025-04-21

## 2025-04-21 DIAGNOSIS — M23.92 INTERNAL DERANGEMENT OF LEFT KNEE: Primary | ICD-10-CM

## 2025-04-21 NOTE — TELEPHONE ENCOUNTER
Caller: Patient    Doctor: Dr. Zavala    Reason for call: Patient is calling requesting if her handicapped placard could be extended at least until after her MRI. She was unable to get in to get an MRI until 5/13/25    Call back#: [phone]

## 2025-05-15 ENCOUNTER — HOSPITAL ENCOUNTER (OUTPATIENT)
Dept: MRI IMAGING | Facility: CLINIC | Age: 60
Discharge: HOME | End: 2025-05-15
Attending: ORTHOPAEDIC SURGERY
Payer: COMMERCIAL

## 2025-05-15 DIAGNOSIS — M23.92 INTERNAL DERANGEMENT OF LEFT KNEE: ICD-10-CM

## 2025-05-15 PROCEDURE — 73721 MRI JNT OF LWR EXTRE W/O DYE: CPT

## 2025-05-19 ENCOUNTER — OFFICE VISIT (OUTPATIENT)
Dept: OBGYN CLINIC | Facility: CLINIC | Age: 60
End: 2025-05-19
Payer: COMMERCIAL

## 2025-05-19 VITALS — HEIGHT: 62 IN | WEIGHT: 157 LBS | BODY MASS INDEX: 28.89 KG/M2

## 2025-05-19 DIAGNOSIS — M25.561 RIGHT KNEE PAIN, UNSPECIFIED CHRONICITY: ICD-10-CM

## 2025-05-19 DIAGNOSIS — M25.462 EFFUSION OF LEFT KNEE: ICD-10-CM

## 2025-05-19 DIAGNOSIS — S83.242D TEAR OF MEDIAL MENISCUS OF LEFT KNEE, CURRENT, UNSPECIFIED TEAR TYPE, SUBSEQUENT ENCOUNTER: Primary | ICD-10-CM

## 2025-05-19 PROCEDURE — 99214 OFFICE O/P EST MOD 30 MIN: CPT | Performed by: ORTHOPAEDIC SURGERY

## 2025-05-19 NOTE — PROGRESS NOTES
Name: Fartun Hurley      : 1965      MRN: [id number]  Encounter Provider: Robin Zavala DO  Encounter Date: 2025   Encounter department: [facility] [place]  :  Assessment & Plan  Tear of medial meniscus of left knee, current, unspecified tear type, subsequent encounter    Orders:    Ambulatory Referral to Physical Therapy; Future    Effusion of left knee         Right knee pain, unspecified chronicity               Left knee medial meniscus tear with persistent pain and effusion  MRI reviewed with patient  Non operative management discussed including outpatient PT to work on strengthening, stretching and range of motion, activity modification, OTC topical and oral analgesics, and possible injection therapy.  Risks of nonoperative treatment include persistent pain, functional limitations, tear propagation, and need for subsequent surgery.  Surgical management was discussed by means of Left knee arthroscopic partial medial menisectomy.   Risks of surgical management including but not limited to anesthesia complications, infection, damage to nerves and blood vessels, DVT, PE, loss of life or limb, postoperative stiffness, recurrent meniscal tearing, posttraumatic arthritis, residual pain, need for subsequent surgery   Discussed intraoperative findings, outpatient PT, return to driving and unrestricted activity  At this time, patient would like to hold off on surgery at this time as she has some social events coming up and does not want to miss work.  At this time, would not recommend updating handicap placard. Advised patient these are used post operatively.  Would not provide repeat CSI as her most recent was 2025. Would not repeat earlier than 3 months from most recent.  Follow up as needed. If symptoms persist or worsen, can consider follow up sooner for consideration of injection therapy vs discussion of surgery.      History of the Present Illness  "    History of Present Illness   HPI   Fartun Hurley is a 59 y.o. female with Left knee internal derangement. Today, patient reports she is feeling a little better since last appointment. She admits this is not as painful as when her Right knee meniscus was torn. She admits she would like to consider surgery at some time in the future, but not at this time. She locates most of her pain to the medial aspect of the knee, worse after working out, increased flexion and twisting.         Review of Systems     Review of Systems   Constitutional:  Negative for chills and fever.   HENT:  Negative for congestion.    Respiratory:  Negative for cough, chest tightness and shortness of breath.    Cardiovascular:  Negative for chest pain and palpitations.   Gastrointestinal:  Negative for abdominal pain.   Endocrine: Negative for cold intolerance and heat intolerance.   Neurological:  Negative for syncope.   Psychiatric/Behavioral:  Negative for confusion.        Physical Exam     Objective   Ht 5' 2\" (1.575 m)   Wt 71.2 kg (157 lb)   BMI 28.72 kg/m²        Left Knee  Range of motion from 0 to 125 degrees with pain at terminal flexion.    There is trace effusion.    There is tenderness over the medial joint line.    The patient is able to perform a straight leg raise with 5/5 quad strength.    Varus stress testing reveals no pain or instability at 0 and 30 degrees   Valgus stress testing reveals no pain or instability at 0 and 30 degrees  Avery Positive medially   The patient is neurovascular intact distally.      Data Review     I have personally reviewed pertinent films in PACS, and my interpretation follows.    X-rays taken 3/31/2025 of Left knee demonstrate minimal joint space narrowing to medial and patellofemoral compartment.       MRI of Left knee performed 05/15/2025 independently reviewed and demonstrates horizontal tear of posterior horn the medial meniscus.    Lab Results   Component Value Date/Time    " HGBA1C 5.7 04/07/2025 01:31 PM    HGBA1C 6.9 (H) 12/30/2024 04:28 PM    ESR 13 02/15/2019 09:24 AM    CRP <1.0 05/06/2024 02:50 PM       Social History[1]        Procedures  None     Samantha Baker PA-C          [1]   Social History  Tobacco Use    Smoking status: Never     Passive exposure: Never    Smokeless tobacco: Never   Vaping Use    Vaping status: Never Used   Substance Use Topics    Alcohol use: Yes     Alcohol/week: 2.0 standard drinks of alcohol     Types: 2 Glasses of wine per week     Comment: socially    Drug use: No

## 2025-06-02 ENCOUNTER — HOSPITAL ENCOUNTER (OUTPATIENT)
Age: 60
Discharge: HOME | End: 2025-06-02
Payer: COMMERCIAL

## 2025-06-02 DIAGNOSIS — Z12.31 ENCOUNTER FOR SCREENING MAMMOGRAM FOR MALIGNANT NEOPLASM OF BREAST: ICD-10-CM

## 2025-06-02 PROCEDURE — 77063 BREAST TOMOSYNTHESIS BI: CPT

## 2025-06-02 PROCEDURE — 77067 SCR MAMMO BI INCL CAD: CPT

## 2025-06-03 DIAGNOSIS — E11.65 TYPE 2 DIABETES MELLITUS WITH HYPERGLYCEMIA, WITHOUT LONG-TERM CURRENT USE OF INSULIN (HCC): ICD-10-CM

## 2025-06-04 RX ORDER — TIRZEPATIDE 5 MG/.5ML
5 INJECTION, SOLUTION SUBCUTANEOUS WEEKLY
Qty: 6 ML | Refills: 0 | Status: SHIPPED | OUTPATIENT
Start: 2025-06-04

## 2025-06-23 DIAGNOSIS — I10 ESSENTIAL HYPERTENSION: ICD-10-CM

## 2025-06-23 DIAGNOSIS — R80.1 PERSISTENT PROTEINURIA: ICD-10-CM

## 2025-06-24 RX ORDER — LOSARTAN POTASSIUM 25 MG/1
25 TABLET ORAL DAILY
Qty: 100 TABLET | Refills: 0 | OUTPATIENT
Start: 2025-06-24

## 2025-06-30 ENCOUNTER — APPOINTMENT (OUTPATIENT)
Age: 60
End: 2025-06-30
Payer: COMMERCIAL

## 2025-06-30 DIAGNOSIS — N18.31 STAGE 3A CHRONIC KIDNEY DISEASE (CKD) (HCC): ICD-10-CM

## 2025-06-30 LAB
25(OH)D3 SERPL-MCNC: 57 NG/ML (ref 30–100)
ALBUMIN SERPL BCG-MCNC: 4.5 G/DL (ref 3.5–5)
ALP SERPL-CCNC: 74 U/L (ref 34–104)
ALT SERPL W P-5'-P-CCNC: 10 U/L (ref 7–52)
ANION GAP SERPL CALCULATED.3IONS-SCNC: 9 MMOL/L (ref 4–13)
AST SERPL W P-5'-P-CCNC: 14 U/L (ref 13–39)
BASOPHILS # BLD AUTO: 0.02 THOUSANDS/ÂΜL (ref 0–0.1)
BASOPHILS NFR BLD AUTO: 1 % (ref 0–1)
BILIRUB SERPL-MCNC: 0.36 MG/DL (ref 0.2–1)
BUN SERPL-MCNC: 12 MG/DL (ref 5–25)
CALCIUM SERPL-MCNC: 9.4 MG/DL (ref 8.4–10.2)
CHLORIDE SERPL-SCNC: 104 MMOL/L (ref 96–108)
CO2 SERPL-SCNC: 29 MMOL/L (ref 21–32)
CREAT SERPL-MCNC: 0.92 MG/DL (ref 0.6–1.3)
EOSINOPHIL # BLD AUTO: 0.06 THOUSAND/ÂΜL (ref 0–0.61)
EOSINOPHIL NFR BLD AUTO: 1 % (ref 0–6)
ERYTHROCYTE [DISTWIDTH] IN BLOOD BY AUTOMATED COUNT: 13 % (ref 11.6–15.1)
GFR SERPL CREATININE-BSD FRML MDRD: 67 ML/MIN/1.73SQ M
GLUCOSE P FAST SERPL-MCNC: 83 MG/DL (ref 65–99)
HCT VFR BLD AUTO: 40.8 % (ref 34.8–46.1)
HGB BLD-MCNC: 13.3 G/DL (ref 11.5–15.4)
IMM GRANULOCYTES # BLD AUTO: 0.01 THOUSAND/UL (ref 0–0.2)
IMM GRANULOCYTES NFR BLD AUTO: 0 % (ref 0–2)
LYMPHOCYTES # BLD AUTO: 2.41 THOUSANDS/ÂΜL (ref 0.6–4.47)
LYMPHOCYTES NFR BLD AUTO: 57 % (ref 14–44)
MCH RBC QN AUTO: 27.4 PG (ref 26.8–34.3)
MCHC RBC AUTO-ENTMCNC: 32.6 G/DL (ref 31.4–37.4)
MCV RBC AUTO: 84 FL (ref 82–98)
MONOCYTES # BLD AUTO: 0.31 THOUSAND/ÂΜL (ref 0.17–1.22)
MONOCYTES NFR BLD AUTO: 7 % (ref 4–12)
NEUTROPHILS # BLD AUTO: 1.45 THOUSANDS/ÂΜL (ref 1.85–7.62)
NEUTS SEG NFR BLD AUTO: 34 % (ref 43–75)
NRBC BLD AUTO-RTO: 0 /100 WBCS
PHOSPHATE SERPL-MCNC: 3.3 MG/DL (ref 2.3–4.1)
PLATELET # BLD AUTO: 355 THOUSANDS/UL (ref 149–390)
PMV BLD AUTO: 10.7 FL (ref 8.9–12.7)
POTASSIUM SERPL-SCNC: 3.9 MMOL/L (ref 3.5–5.3)
PROT SERPL-MCNC: 7.4 G/DL (ref 6.4–8.4)
PTH-INTACT SERPL-MCNC: 61.5 PG/ML (ref 12–88)
RBC # BLD AUTO: 4.85 MILLION/UL (ref 3.81–5.12)
SODIUM SERPL-SCNC: 142 MMOL/L (ref 135–147)
WBC # BLD AUTO: 4.26 THOUSAND/UL (ref 4.31–10.16)

## 2025-06-30 PROCEDURE — 36415 COLL VENOUS BLD VENIPUNCTURE: CPT

## 2025-06-30 PROCEDURE — 82306 VITAMIN D 25 HYDROXY: CPT

## 2025-06-30 PROCEDURE — 80053 COMPREHEN METABOLIC PANEL: CPT

## 2025-06-30 PROCEDURE — 84100 ASSAY OF PHOSPHORUS: CPT

## 2025-06-30 PROCEDURE — 85025 COMPLETE CBC W/AUTO DIFF WBC: CPT

## 2025-06-30 PROCEDURE — 83970 ASSAY OF PARATHORMONE: CPT

## 2025-07-11 ENCOUNTER — OFFICE VISIT (OUTPATIENT)
Dept: NEPHROLOGY | Facility: CLINIC | Age: 60
End: 2025-07-11
Payer: COMMERCIAL

## 2025-07-11 VITALS
BODY MASS INDEX: 27.6 KG/M2 | DIASTOLIC BLOOD PRESSURE: 79 MMHG | HEART RATE: 77 BPM | OXYGEN SATURATION: 98 % | SYSTOLIC BLOOD PRESSURE: 119 MMHG | WEIGHT: 150 LBS | HEIGHT: 62 IN | TEMPERATURE: 97.6 F

## 2025-07-11 DIAGNOSIS — I10 ESSENTIAL HYPERTENSION: ICD-10-CM

## 2025-07-11 DIAGNOSIS — E11.22 TYPE 2 DIABETES MELLITUS WITH STAGE 2 CHRONIC KIDNEY DISEASE, WITHOUT LONG-TERM CURRENT USE OF INSULIN  (HCC): ICD-10-CM

## 2025-07-11 DIAGNOSIS — N18.2 TYPE 2 DIABETES MELLITUS WITH STAGE 2 CHRONIC KIDNEY DISEASE, WITHOUT LONG-TERM CURRENT USE OF INSULIN  (HCC): ICD-10-CM

## 2025-07-11 DIAGNOSIS — R80.1 PERSISTENT PROTEINURIA: ICD-10-CM

## 2025-07-11 DIAGNOSIS — N18.2 CKD (CHRONIC KIDNEY DISEASE) STAGE 2, GFR 60-89 ML/MIN: Primary | ICD-10-CM

## 2025-07-11 PROCEDURE — 99214 OFFICE O/P EST MOD 30 MIN: CPT | Performed by: INTERNAL MEDICINE

## 2025-07-11 RX ORDER — LOSARTAN POTASSIUM 25 MG/1
25 TABLET ORAL DAILY
Qty: 90 TABLET | Refills: 6 | Status: SHIPPED | OUTPATIENT
Start: 2025-07-11

## 2025-07-11 NOTE — ASSESSMENT & PLAN NOTE
Lab Results   Component Value Date    HGBA1C 5.7 04/07/2025     Patient with underlying chronic kidney disease stage II to stage III with etiology being diabetic nephropathy, hypertensive nephrosclerosis and obesity.  Noted significant improvement in glycemic index with introduction of Mounjaro.  Creatinine is 0.9 mg/dL and EGFR of 68 and improved.  Urinalysis was not performed.  Patient will undergo urinalysis with UACR next week.

## 2025-07-11 NOTE — PROGRESS NOTES
Name: Fartun Hurley      : 1965      MRN: [id number]  Encounter Provider: Steve Pimentel MD  Encounter Date: 2025   Encounter department: [facility]  :  Assessment & Plan  Type 2 diabetes mellitus with stage 2 chronic kidney disease, without long-term current use of insulin  ([facility])    Lab Results   Component Value Date    HGBA1C 5.7 2025     Patient with underlying chronic kidney disease stage II to stage III with etiology being diabetic nephropathy, hypertensive nephrosclerosis and obesity.  Noted significant improvement in glycemic index with introduction of Mounjaro.  Creatinine is 0.9 mg/dL and EGFR of 68 and improved.  Urinalysis was not performed.  Patient will undergo urinalysis with UACR next week.       Persistent proteinuria    Orders:    losartan (COZAAR) 25 mg tablet; Take 1 tablet (25 mg total) by mouth daily  Patient was initiated on losartan 12.5 mg daily by me earlier this year due to worsening proteinuria.  Currently patient is taking full tablet of Cozaar.  Essential hypertension  Blood pressures in excellent range at 119/79 while on losartan and nifedipine.           History of Present Illness   HPI  Fartun Hurley is a 59 y.o. female who presents to renal office for management of CKD.  States that she has lost approximately 20 pounds since being on Mounjaro.  Denies any lower extremity edema, worsening foamy urine.  History obtained from: patient    Review of Systems   Constitutional: Negative.    HENT: Negative.     Eyes: Negative.    Respiratory: Negative.     Cardiovascular: Negative.    Gastrointestinal: Negative.    Endocrine: Negative.    Genitourinary: Negative.    Musculoskeletal: Negative.    Skin: Negative.    Allergic/Immunologic: Negative.    Neurological: Negative.    Hematological: Negative.    All other systems reviewed and are negative.    Medical History Reviewed by provider this encounter:     .    "  Objective   /79 (BP Location: Left arm, Patient Position: Sitting, Cuff Size: Standard)   Pulse 77   Temp 97.6 °F (36.4 °C) (Tympanic)   Ht 5' 2\" (1.575 m)   Wt 68 kg (150 lb)   SpO2 98%   BMI 27.44 kg/m²      Physical Exam  Constitutional:       Appearance: She is well-developed.   HENT:      Head: Normocephalic and atraumatic.     Eyes:      Pupils: Pupils are equal, round, and reactive to light.       Cardiovascular:      Rate and Rhythm: Normal rate and regular rhythm.      Heart sounds: Normal heart sounds.   Pulmonary:      Effort: Pulmonary effort is normal.   Abdominal:      General: Bowel sounds are normal.      Palpations: Abdomen is soft.     Musculoskeletal:         General: Normal range of motion.      Cervical back: Neck supple.     Skin:     General: Skin is warm.     Neurological:      Mental Status: She is alert and oriented to person, place, and time.         Administrative Statements   I have spent a total time of 38 minutes in caring for this patient on the day of the visit/encounter including Diagnostic results, Prognosis, Risks and benefits of tx options, Instructions for management, Importance of tx compliance, Risk factor reductions, Impressions, Counseling / Coordination of care, and Documenting in the medical record.  "

## 2025-07-22 ENCOUNTER — APPOINTMENT (OUTPATIENT)
Age: 60
End: 2025-07-22
Attending: INTERNAL MEDICINE
Payer: COMMERCIAL

## 2025-07-22 DIAGNOSIS — R80.1 PERSISTENT PROTEINURIA: ICD-10-CM

## 2025-07-22 DIAGNOSIS — N18.2 CKD (CHRONIC KIDNEY DISEASE) STAGE 2, GFR 60-89 ML/MIN: ICD-10-CM

## 2025-07-22 LAB
BACTERIA UR QL AUTO: ABNORMAL /HPF
BILIRUB UR QL STRIP: NEGATIVE
CAOX CRY URNS QL MICRO: ABNORMAL /HPF
CLARITY UR: ABNORMAL
COLOR UR: YELLOW
CREAT UR-MCNC: 571.3 MG/DL
GLUCOSE UR STRIP-MCNC: NEGATIVE MG/DL
HGB UR QL STRIP.AUTO: NEGATIVE
HYALINE CASTS #/AREA URNS LPF: ABNORMAL /LPF
KETONES UR STRIP-MCNC: NEGATIVE MG/DL
LEUKOCYTE ESTERASE UR QL STRIP: ABNORMAL
MICROALBUMIN UR-MCNC: 521.9 MG/L
MICROALBUMIN/CREAT 24H UR: 91 MG/G CREATININE (ref 0–30)
MUCOUS THREADS UR QL AUTO: ABNORMAL
NITRITE UR QL STRIP: NEGATIVE
NON-SQ EPI CELLS URNS QL MICRO: ABNORMAL /HPF
PH UR STRIP.AUTO: 6 [PH]
PROT UR STRIP-MCNC: ABNORMAL MG/DL
RBC #/AREA URNS AUTO: ABNORMAL /HPF
SP GR UR STRIP.AUTO: 1.02 (ref 1–1.03)
UROBILINOGEN UR STRIP-ACNC: 2 MG/DL
WBC #/AREA URNS AUTO: ABNORMAL /HPF

## 2025-07-22 PROCEDURE — 81001 URINALYSIS AUTO W/SCOPE: CPT

## 2025-07-22 PROCEDURE — 82043 UR ALBUMIN QUANTITATIVE: CPT

## 2025-07-22 PROCEDURE — 82570 ASSAY OF URINE CREATININE: CPT

## (undated) DEVICE — 4-PORT MANIFOLD: Brand: NEPTUNE 2

## (undated) DEVICE — PADDING CAST 4 IN  COTTON STRL

## (undated) DEVICE — GLOVE SRG BIOGEL 7

## (undated) DEVICE — 3M™ STERI-DRAPE™ U-DRAPE 1015: Brand: STERI-DRAPE™

## (undated) DEVICE — GAUZE SPONGES,16 PLY: Brand: CURITY

## (undated) DEVICE — 3M™ STERI-STRIP™ REINFORCED ADHESIVE SKIN CLOSURES, R1547, 1/2 IN X 4 IN (12 MM X 100 MM), 6 STRIPS/ENVELOPE: Brand: 3M™ STERI-STRIP™

## (undated) DEVICE — LIGHT GLOVE GREEN

## (undated) DEVICE — ABDOMINAL PAD: Brand: DERMACEA

## (undated) DEVICE — TUBING ARTHROSCOPIC WAVE  MAIN PUMP

## (undated) DEVICE — T.E.D. ANTI-EMBOLISM ELASTIC STOCKINGS, THIGH LENGTH, X-LARGE REGULAR WHITE, NONSTERILE, PRODUCT CODE 3181LF: Brand: T.E.D.

## (undated) DEVICE — ACE WRAP 6 IN UNSTERILE

## (undated) DEVICE — GLOVE INDICATOR PI UNDERGLOVE SZ 7.5 BLUE

## (undated) DEVICE — COBAN 4 IN STERILE

## (undated) DEVICE — SUT ETHILON 3-0 PS-1 18 IN 1663G

## (undated) DEVICE — IMPERVIOUS STOCKINETTE: Brand: DEROYAL

## (undated) DEVICE — DRAPE EQUIPMENT RF WAND

## (undated) DEVICE — GLOVE INDICATOR PI UNDERGLOVE SZ 7 BLUE

## (undated) DEVICE — OCCLUSIVE GAUZE STRIP,3% BISMUTH TRIBROMOPHENATE IN PETROLATUM BLEND: Brand: XEROFORM

## (undated) DEVICE — INTENDED FOR TISSUE SEPARATION, AND OTHER PROCEDURES THAT REQUIRE A SHARP SURGICAL BLADE TO PUNCTURE OR CUT.: Brand: BARD-PARKER ® CARBON RIB-BACK BLADES

## (undated) DEVICE — BETHLEHEM UNIVERSAL  ARTHRO PK: Brand: CARDINAL HEALTH

## (undated) DEVICE — GLOVE SRG BIOGEL 7.5

## (undated) DEVICE — ANTI-EMBOLISM STOCKINGS,THIGH LENGTH,MEDIUM,REGULAR,SIZE E: Brand: T.E.D.

## (undated) DEVICE — SPONGE LAP 18 X 18 IN

## (undated) DEVICE — SCD SEQUENTIAL COMPRESSION COMFORT SLEEVE MEDIUM KNEE LENGTH: Brand: KENDALL SCD

## (undated) DEVICE — CHLORAPREP HI-LITE 26ML ORANGE

## (undated) DEVICE — PAD CAST 4 IN COTTON NON STERILE